# Patient Record
Sex: FEMALE | Race: WHITE | URBAN - METROPOLITAN AREA
[De-identification: names, ages, dates, MRNs, and addresses within clinical notes are randomized per-mention and may not be internally consistent; named-entity substitution may affect disease eponyms.]

---

## 2017-08-29 ENCOUNTER — OP HISTORICAL/CONVERTED ENCOUNTER (OUTPATIENT)
Dept: OTHER | Age: 82
End: 2017-08-29

## 2017-09-01 ENCOUNTER — OP HISTORICAL/CONVERTED ENCOUNTER (OUTPATIENT)
Dept: OTHER | Age: 82
End: 2017-09-01

## 2020-10-21 ENCOUNTER — TELEPHONE (OUTPATIENT)
Dept: ENT CLINIC | Age: 85
End: 2020-10-21

## 2020-12-23 ENCOUNTER — OFFICE VISIT (OUTPATIENT)
Dept: ENT CLINIC | Age: 85
End: 2020-12-23
Payer: MEDICARE

## 2020-12-23 VITALS
TEMPERATURE: 97.5 F | HEIGHT: 64 IN | HEART RATE: 69 BPM | DIASTOLIC BLOOD PRESSURE: 80 MMHG | OXYGEN SATURATION: 96 % | BODY MASS INDEX: 24.41 KG/M2 | RESPIRATION RATE: 18 BRPM | SYSTOLIC BLOOD PRESSURE: 126 MMHG | WEIGHT: 143 LBS

## 2020-12-23 DIAGNOSIS — H61.21 IMPACTED CERUMEN OF RIGHT EAR: ICD-10-CM

## 2020-12-23 DIAGNOSIS — J30.9 ALLERGIC RHINITIS, UNSPECIFIED SEASONALITY, UNSPECIFIED TRIGGER: ICD-10-CM

## 2020-12-23 DIAGNOSIS — H90.3 SENSORINEURAL HEARING LOSS (SNHL) OF BOTH EARS: ICD-10-CM

## 2020-12-23 DIAGNOSIS — H69.83 ETD (EUSTACHIAN TUBE DYSFUNCTION), BILATERAL: ICD-10-CM

## 2020-12-23 PROCEDURE — 99213 OFFICE O/P EST LOW 20 MIN: CPT | Performed by: OTOLARYNGOLOGY

## 2020-12-23 PROCEDURE — 1090F PRES/ABSN URINE INCON ASSESS: CPT | Performed by: OTOLARYNGOLOGY

## 2020-12-23 PROCEDURE — G8510 SCR DEP NEG, NO PLAN REQD: HCPCS | Performed by: OTOLARYNGOLOGY

## 2020-12-23 PROCEDURE — 69210 REMOVE IMPACTED EAR WAX UNI: CPT | Performed by: OTOLARYNGOLOGY

## 2020-12-23 PROCEDURE — G8427 DOCREV CUR MEDS BY ELIG CLIN: HCPCS | Performed by: OTOLARYNGOLOGY

## 2020-12-23 PROCEDURE — G8536 NO DOC ELDER MAL SCRN: HCPCS | Performed by: OTOLARYNGOLOGY

## 2020-12-23 PROCEDURE — 1101F PT FALLS ASSESS-DOCD LE1/YR: CPT | Performed by: OTOLARYNGOLOGY

## 2020-12-23 PROCEDURE — G8420 CALC BMI NORM PARAMETERS: HCPCS | Performed by: OTOLARYNGOLOGY

## 2020-12-23 NOTE — PROGRESS NOTES
Subjective:    Josi Nava   80 y.o.   10/8/1930       Location - ears, nose    Quality - ETD, hearing loss chronic rhinitis    Severity -moderate    Duration -years    Timing -chronic    Context -patient following up today complaining of right ear hearing loss and fullness. History of ETD has required tympanostomy tube in the past.  She has ongoing chronic rhinitis, allergy immunotherapy for many many years in the past she currently does take Flonase but no  oral antihistamine     Modifying Features -none    Associated symptoms/signs -allergic rhinitis      Review of Systems  Review of Systems   Constitutional: Negative for chills and fever. HENT: Positive for congestion and hearing loss. Negative for ear pain, nosebleeds and tinnitus. Eyes: Negative for blurred vision and double vision. Respiratory: Negative for cough, sputum production and shortness of breath. Cardiovascular: Negative for chest pain and palpitations. Gastrointestinal: Negative for heartburn, nausea and vomiting. Musculoskeletal: Positive for joint pain. Negative for neck pain. Skin: Negative. Neurological: Negative for dizziness, speech change, weakness and headaches. Endo/Heme/Allergies: Positive for environmental allergies. Does not bruise/bleed easily. Psychiatric/Behavioral: Negative for memory loss. The patient does not have insomnia. Past Medical History:   Diagnosis Date    Acquired absence of both cervix and uterus 10/15/2012    Atrophy of vulva 4/22/2009    Generalized anxiety disorder     Hx of bone density study 2/24/14    Normal    Hypertension     Postmenopausal atrophic vaginitis 4/22/2009    Symptomatic menopausal or female climacteric states     Unspecified asthma(493.90)     Unspecified hypothyroidism      Prior to Admission medications    Medication Sig Start Date End Date Taking? Authorizing Provider   ASPIRIN PO Take 81 mg by mouth.     Provider, Historical   azelastine (ASTELIN) 137 mcg nasal spray 1 Spray two (2) times a day. Use in each nostril as directed    Provider, Historical   TOLTERODINE TARTRATE (DETROL PO) Take  by mouth. Provider, Historical   FLUTICASONE PROPIONATE (FLOVENT DISKUS IN) Take  by inhalation. Provider, Historical   NITROFURANTOIN MACROCRYSTAL PO Take  by mouth. Provider, Historical   LEVOTHYROXINE SODIUM (SYNTHROID PO) Take  by mouth. Provider, Historical   ALPRAZOLAM (XANAX PO) Take  by mouth. Provider, Historical   budesonide-formoterol (SYMBICORT) 80-4.5 mcg/actuation HFAA inhaler Take 2 Puffs by inhalation two (2) times a day. Provider, Historical   CALCIUM CARBONATE/VITAMIN D3 (CALCIUM WITH VITAMIN D PO) Take  by mouth. Provider, Historical   MULTIVIT &MINERALS/FERROUS FUM (MULTI VITAMIN PO) Take  by mouth. Provider, Historical   PREGABALIN (LYRICA PO) Take  by mouth. Provider, Historical   PSYLLIUM SEED, WITH DEXTROSE, (FIBER PO) Take  by mouth. Provider, Historical            Objective:     Visit Vitals  /80 (BP 1 Location: Left arm, BP Patient Position: Sitting)   Pulse 69   Temp 97.5 °F (36.4 °C)   Resp 18   Ht 5' 4\" (1.626 m)   Wt 143 lb (64.9 kg)   LMP 01/01/1970   SpO2 96%   BMI 24.55 kg/m²        Physical Exam  Vitals signs reviewed. Constitutional:       General: She is awake. She is not in acute distress. Appearance: Normal appearance. She is well-groomed and normal weight. HENT:      Head: Normocephalic and atraumatic. Jaw: There is normal jaw occlusion. No trismus, tenderness or malocclusion. Salivary Glands: Right salivary gland is not diffusely enlarged or tender. Left salivary gland is not diffusely enlarged or tender. Right Ear: Tympanic membrane, ear canal and external ear normal. Decreased hearing noted. There is impacted cerumen. Left Ear: Tympanic membrane, ear canal and external ear normal. Decreased hearing noted. Ears:      Patel exam findings: does not lateralize.      Right Rinne: AC > BC. Left Rinne: AC > BC. Comments:   Procedure - Removal Impacted Cerumen    Indications: cerumen impaction    Ears are examined under microscope/headlight.  right ears are cleaned using otologic curette, suction, and/or alligator forceps. Nose: Rhinorrhea present. No nasal deformity, septal deviation or mucosal edema. Right Nostril: No epistaxis. Left Nostril: No epistaxis. Right Turbinates: Not enlarged, swollen or pale. Left Turbinates: Not enlarged, swollen or pale. Right Sinus: No maxillary sinus tenderness or frontal sinus tenderness. Left Sinus: No maxillary sinus tenderness or frontal sinus tenderness. Mouth/Throat:      Lips: Pink. No lesions. Mouth: Mucous membranes are moist. No oral lesions. Dentition: Normal dentition. No dental caries. Tongue: No lesions. Palate: No mass and lesions. Pharynx: Oropharynx is clear. Uvula midline. No oropharyngeal exudate or posterior oropharyngeal erythema. Tonsils: No tonsillar exudate. 0 on the right. 0 on the left. Eyes:      General: Vision grossly intact. Extraocular Movements: Extraocular movements intact. Right eye: No nystagmus. Left eye: No nystagmus. Conjunctiva/sclera: Conjunctivae normal.      Pupils: Pupils are equal, round, and reactive to light. Neck:      Musculoskeletal: No edema or erythema. Thyroid: No thyroid mass, thyromegaly or thyroid tenderness. Trachea: Trachea and phonation normal. No tracheal tenderness or tracheal deviation. Cardiovascular:      Rate and Rhythm: Normal rate and regular rhythm. Pulmonary:      Effort: Pulmonary effort is normal. No respiratory distress. Breath sounds: No stridor. Musculoskeletal: Normal range of motion. General: No swelling or tenderness. Lymphadenopathy:      Cervical: No cervical adenopathy. Skin:     General: Skin is warm and dry.       Findings: No lesion or rash.   Neurological:      General: No focal deficit present. Mental Status: She is alert and oriented to person, place, and time. Mental status is at baseline. Cranial Nerves: Cranial nerves are intact. Coordination: Romberg sign negative. Gait: Gait is intact. Comments: Negative Hallpike   Psychiatric:         Mood and Affect: Mood normal.         Behavior: Behavior normal. Behavior is cooperative. Assessment/Plan:     Encounter Diagnoses   Name Primary?  Allergic rhinitis, unspecified seasonality, unspecified trigger     Sensorineural hearing loss (SNHL) of both ears     Impacted cerumen of right ear     ETD (Eustachian tube dysfunction), bilateral        Right ear is cleaned, hearing is improved  She will continue on her current regimen with allergy medication  Follow-up 6 months    Orders Placed This Encounter    REMOVE IMPACTED EAR WAX     Follow-up and Dispositions    · Return in about 6 months (around 6/23/2021).

## 2021-06-23 ENCOUNTER — OFFICE VISIT (OUTPATIENT)
Dept: ENT CLINIC | Age: 86
End: 2021-06-23
Payer: MEDICARE

## 2021-06-23 VITALS
HEART RATE: 77 BPM | BODY MASS INDEX: 24.41 KG/M2 | OXYGEN SATURATION: 98 % | WEIGHT: 143 LBS | TEMPERATURE: 97.4 F | RESPIRATION RATE: 18 BRPM | DIASTOLIC BLOOD PRESSURE: 76 MMHG | SYSTOLIC BLOOD PRESSURE: 120 MMHG | HEIGHT: 64 IN

## 2021-06-23 DIAGNOSIS — H69.83 ETD (EUSTACHIAN TUBE DYSFUNCTION), BILATERAL: ICD-10-CM

## 2021-06-23 DIAGNOSIS — H90.3 SENSORINEURAL HEARING LOSS (SNHL) OF BOTH EARS: ICD-10-CM

## 2021-06-23 DIAGNOSIS — J30.9 ALLERGIC RHINITIS, UNSPECIFIED SEASONALITY, UNSPECIFIED TRIGGER: Primary | ICD-10-CM

## 2021-06-23 DIAGNOSIS — H61.21 IMPACTED CERUMEN OF RIGHT EAR: ICD-10-CM

## 2021-06-23 PROCEDURE — G8427 DOCREV CUR MEDS BY ELIG CLIN: HCPCS | Performed by: OTOLARYNGOLOGY

## 2021-06-23 PROCEDURE — 1101F PT FALLS ASSESS-DOCD LE1/YR: CPT | Performed by: OTOLARYNGOLOGY

## 2021-06-23 PROCEDURE — G8420 CALC BMI NORM PARAMETERS: HCPCS | Performed by: OTOLARYNGOLOGY

## 2021-06-23 PROCEDURE — G8536 NO DOC ELDER MAL SCRN: HCPCS | Performed by: OTOLARYNGOLOGY

## 2021-06-23 PROCEDURE — 1090F PRES/ABSN URINE INCON ASSESS: CPT | Performed by: OTOLARYNGOLOGY

## 2021-06-23 PROCEDURE — G8510 SCR DEP NEG, NO PLAN REQD: HCPCS | Performed by: OTOLARYNGOLOGY

## 2021-06-23 PROCEDURE — 69210 REMOVE IMPACTED EAR WAX UNI: CPT | Performed by: OTOLARYNGOLOGY

## 2021-06-23 PROCEDURE — 99213 OFFICE O/P EST LOW 20 MIN: CPT | Performed by: OTOLARYNGOLOGY

## 2021-06-23 RX ORDER — MONTELUKAST SODIUM 10 MG/1
10 TABLET ORAL DAILY
Qty: 30 TABLET | Refills: 3 | Status: SHIPPED | OUTPATIENT
Start: 2021-06-23

## 2021-06-23 NOTE — PROGRESS NOTES
Subjective:    Edgar Solis   80 y.o.   10/8/1930     Follow-up visit    Initial HPI  Location - ears, nose    Quality - ETD, hearing loss chronic rhinitis    Severity -moderate    Duration -years    Timing -chronic    Context -patient following up today complaining of right ear hearing loss and fullness. History of ETD has required tympanostomy tube in the past.  She has ongoing chronic rhinitis, allergy immunotherapy for many many years in the past she currently does take Flonase but no  oral antihistamine     Modifying Features -none    Associated symptoms/signs -allergic rhinitis    Follow-up HPI  6/23/2021 -6-month follow-up patient states she did have 1 more trouble with her allergies this past spring but did not take anything for it. She has no longer taking Singulair. She is on Symbicort. Right ear occasional popping but no fullness nor pressure. Review of Systems  Review of Systems   Constitutional: Negative for chills and fever. HENT: Positive for congestion and hearing loss. Negative for ear pain, nosebleeds and tinnitus. Eyes: Negative for blurred vision and double vision. Respiratory: Positive for cough. Negative for sputum production and shortness of breath. Cardiovascular: Negative for chest pain and palpitations. Gastrointestinal: Negative for heartburn, nausea and vomiting. Musculoskeletal: Positive for joint pain. Negative for neck pain. Skin: Negative. Neurological: Negative for dizziness, speech change, weakness and headaches. Endo/Heme/Allergies: Positive for environmental allergies. Does not bruise/bleed easily. Psychiatric/Behavioral: Negative for memory loss. The patient does not have insomnia.           Past Medical History:   Diagnosis Date    Acquired absence of both cervix and uterus 10/15/2012    Atrophy of vulva 4/22/2009    Generalized anxiety disorder     Hx of bone density study 2/24/14    Normal    Hypertension     Postmenopausal atrophic vaginitis 4/22/2009    Symptomatic menopausal or female climacteric states     Unspecified asthma(493.90)     Unspecified hypothyroidism      Prior to Admission medications    Medication Sig Start Date End Date Taking? Authorizing Provider   montelukast (SINGULAIR) 10 mg tablet Take 1 Tablet by mouth daily. 6/23/21  Yes Octaviano Marie MD   ASPIRIN PO Take 81 mg by mouth. Provider, Historical   azelastine (ASTELIN) 137 mcg nasal spray 1 Spray two (2) times a day. Use in each nostril as directed    Provider, Historical   TOLTERODINE TARTRATE (DETROL PO) Take  by mouth. Provider, Historical   FLUTICASONE PROPIONATE (FLOVENT DISKUS IN) Take  by inhalation. Provider, Historical   NITROFURANTOIN MACROCRYSTAL PO Take  by mouth. Provider, Historical   LEVOTHYROXINE SODIUM (SYNTHROID PO) Take  by mouth. Provider, Historical   ALPRAZOLAM (XANAX PO) Take  by mouth. Provider, Historical   budesonide-formoterol (SYMBICORT) 80-4.5 mcg/actuation HFAA inhaler Take 2 Puffs by inhalation two (2) times a day. Provider, Historical   CALCIUM CARBONATE/VITAMIN D3 (CALCIUM WITH VITAMIN D PO) Take  by mouth. Provider, Historical   MULTIVIT &MINERALS/FERROUS FUM (MULTI VITAMIN PO) Take  by mouth. Provider, Historical   PREGABALIN (LYRICA PO) Take  by mouth. Provider, Historical   PSYLLIUM SEED, WITH DEXTROSE, (FIBER PO) Take  by mouth. Provider, Historical            Objective:     Visit Vitals  /76 (BP 1 Location: Left upper arm, BP Patient Position: Sitting, BP Cuff Size: Adult)   Pulse 77   Temp 97.4 °F (36.3 °C) (Temporal)   Resp 18   Ht 5' 4\" (1.626 m)   Wt 143 lb (64.9 kg)   LMP 01/01/1970   SpO2 98%   BMI 24.55 kg/m²        Physical Exam  Vitals reviewed. Constitutional:       General: She is awake. She is not in acute distress. Appearance: Normal appearance. She is well-groomed and normal weight. HENT:      Head: Normocephalic and atraumatic. Jaw: There is normal jaw occlusion.  No trismus, tenderness or malocclusion. Salivary Glands: Right salivary gland is not diffusely enlarged or tender. Left salivary gland is not diffusely enlarged or tender. Right Ear: Tympanic membrane, ear canal and external ear normal. Decreased hearing noted. There is impacted cerumen. Left Ear: Tympanic membrane, ear canal and external ear normal. Decreased hearing noted. Ears:      Patel exam findings: does not lateralize. Right Rinne: AC > BC. Left Rinne: AC > BC. Nose: Rhinorrhea present. No nasal deformity, septal deviation or mucosal edema. Right Nostril: No epistaxis. Left Nostril: No epistaxis. Right Turbinates: Not enlarged, swollen or pale. Left Turbinates: Not enlarged, swollen or pale. Right Sinus: No maxillary sinus tenderness or frontal sinus tenderness. Left Sinus: No maxillary sinus tenderness or frontal sinus tenderness. Mouth/Throat:      Lips: Pink. No lesions. Mouth: Mucous membranes are moist. No oral lesions. Dentition: Normal dentition. No dental caries. Tongue: No lesions. Palate: No mass and lesions. Pharynx: Oropharynx is clear. Uvula midline. No oropharyngeal exudate or posterior oropharyngeal erythema. Tonsils: No tonsillar exudate. 0 on the right. 0 on the left. Eyes:      General: Vision grossly intact. Extraocular Movements: Extraocular movements intact. Right eye: No nystagmus. Left eye: No nystagmus. Conjunctiva/sclera: Conjunctivae normal.      Pupils: Pupils are equal, round, and reactive to light. Neck:      Thyroid: No thyroid mass, thyromegaly or thyroid tenderness. Trachea: Trachea and phonation normal. No tracheal tenderness or tracheal deviation. Cardiovascular:      Rate and Rhythm: Normal rate and regular rhythm. Pulmonary:      Effort: Pulmonary effort is normal. No respiratory distress. Breath sounds: No stridor.    Musculoskeletal:         General: No swelling or tenderness. Normal range of motion. Cervical back: No edema or erythema. Lymphadenopathy:      Cervical: No cervical adenopathy. Skin:     General: Skin is warm and dry. Findings: No lesion or rash. Neurological:      General: No focal deficit present. Mental Status: She is alert and oriented to person, place, and time. Mental status is at baseline. Cranial Nerves: Cranial nerves are intact. Coordination: Romberg sign negative. Gait: Gait is intact. Psychiatric:         Mood and Affect: Mood normal.         Behavior: Behavior normal. Behavior is cooperative. Procedure - Removal Impacted Cerumen    Indications: cerumen impaction    Ears are examined under microscope/headlight.  right ears are cleaned using otologic curette, suction, and/or alligator forceps. Assessment/Plan:     Encounter Diagnoses   Name Primary?  Allergic rhinitis, unspecified seasonality, unspecified trigger Yes    Sensorineural hearing loss (SNHL) of both ears     Impacted cerumen of right ear     ETD (Eustachian tube dysfunction), bilateral      Allergic rhinitis -recommend she resume montelukast for the time being it may improve her cough is well    ETD is under good control no evidence of fluid nor retraction today. Right ear is cleaned. Orders Placed This Encounter    REMOVE IMPACTED EAR WAX    montelukast (SINGULAIR) 10 mg tablet     Follow-up and Dispositions    · Return in about 6 months (around 12/23/2021).

## 2021-06-23 NOTE — PROGRESS NOTES
Visit Vitals  /76 (BP 1 Location: Left upper arm, BP Patient Position: Sitting, BP Cuff Size: Adult)   Pulse 77   Temp 97.4 °F (36.3 °C) (Temporal)   Resp 18   Ht 5' 4\" (1.626 m)   Wt 143 lb (64.9 kg)   LMP 01/01/1970   SpO2 98%   BMI 24.55 kg/m²     Chief Complaint   Patient presents with    Follow-up     Allergic rhinitis, unspecified seasonality, unspecified trigger

## 2021-10-28 ENCOUNTER — TELEPHONE (OUTPATIENT)
Dept: ENT CLINIC | Age: 86
End: 2021-10-28

## 2021-10-28 NOTE — TELEPHONE ENCOUNTER
LVM for pt to call back and reschedule appt on 12/22, due to Dr. Andra Eng being out of office that week

## 2022-03-18 PROBLEM — H61.21 IMPACTED CERUMEN OF RIGHT EAR: Status: ACTIVE | Noted: 2020-12-23

## 2022-03-19 PROBLEM — H69.93 ETD (EUSTACHIAN TUBE DYSFUNCTION), BILATERAL: Status: ACTIVE | Noted: 2020-12-23

## 2022-03-19 PROBLEM — H69.83 ETD (EUSTACHIAN TUBE DYSFUNCTION), BILATERAL: Status: ACTIVE | Noted: 2020-12-23

## 2022-03-19 PROBLEM — J30.9 ALLERGIC RHINITIS: Status: ACTIVE | Noted: 2020-12-23

## 2022-03-19 PROBLEM — H90.3 SENSORINEURAL HEARING LOSS (SNHL) OF BOTH EARS: Status: ACTIVE | Noted: 2020-12-23

## 2022-11-29 ENCOUNTER — HOSPITAL ENCOUNTER (EMERGENCY)
Age: 87
Discharge: HOME | End: 2022-11-29
Attending: EMERGENCY MEDICINE
Payer: MEDICARE

## 2022-11-29 VITALS
RESPIRATION RATE: 18 BRPM | OXYGEN SATURATION: 98 % | DIASTOLIC BLOOD PRESSURE: 82 MMHG | WEIGHT: 129 LBS | TEMPERATURE: 97.8 F | HEIGHT: 64 IN | BODY MASS INDEX: 22.02 KG/M2 | SYSTOLIC BLOOD PRESSURE: 175 MMHG | HEART RATE: 77 BPM

## 2022-11-29 DIAGNOSIS — S81.811A NONINFECTED SKIN TEAR OF RIGHT LEG, INITIAL ENCOUNTER: Primary | ICD-10-CM

## 2022-11-29 DIAGNOSIS — S80.11XA CONTUSION OF RIGHT LOWER EXTREMITY, INITIAL ENCOUNTER: ICD-10-CM

## 2022-11-29 PROCEDURE — 74011000250 HC RX REV CODE- 250: Performed by: EMERGENCY MEDICINE

## 2022-11-29 PROCEDURE — 99283 EMERGENCY DEPT VISIT LOW MDM: CPT

## 2022-11-29 RX ORDER — BACITRACIN 500 [USP'U]/G
OINTMENT TOPICAL 3 TIMES DAILY
Qty: 30 G | Refills: 0 | Status: SHIPPED | OUTPATIENT
Start: 2022-11-29 | End: 2022-12-09

## 2022-11-29 RX ORDER — BACITRACIN 500 UNIT/G
1 PACKET (EA) TOPICAL
Status: COMPLETED | OUTPATIENT
Start: 2022-11-29 | End: 2022-11-29

## 2022-11-29 RX ADMIN — Medication 1 PACKET: at 14:29

## 2022-11-29 NOTE — ED PROVIDER NOTES
The history is provided by the patient. Laceration   Incident onset: 3 days ago. The laceration is located on the Right leg. The laceration is 2 cm in size. Injury mechanism: scraped her right lateral knee on a table. Foreign body present: no. The pain is mild. The pain has been Constant since onset. Associated symptoms include discoloration (bruising surrounding with some soft tissue swelling). Pertinent negatives include no numbness and no loss of motion. The patient's last tetanus shot was less than 5 years ago. Leg Pain   Pertinent negatives include no numbness.       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    Atrophy of vulva 4/22/2009    Generalized anxiety disorder     Hx of bone density study 2/24/14    Normal    Hypertension     Postmenopausal atrophic vaginitis 4/22/2009    Symptomatic menopausal or female climacteric states     Unspecified asthma(493.90)     Unspecified hypothyroidism        Past Surgical History:   Procedure Laterality Date    HX OTHER SURGICAL      Anterior Posterior Repair    HX PARTIAL THYROIDECTOMY      HX TONSILLECTOMY      HX TOTAL ABDOMINAL HYSTERECTOMY  1970    W/ USO    HX UROLOGICAL      TVT         Family History:   Problem Relation Age of Onset    Cancer Father         Prostate       Social History     Socioeconomic History    Marital status:      Spouse name: Not on file    Number of children: Not on file    Years of education: Not on file    Highest education level: Not on file   Occupational History    Not on file   Tobacco Use    Smoking status: Never    Smokeless tobacco: Never   Substance and Sexual Activity    Alcohol use: Not on file    Drug use: Not on file    Sexual activity: Not Currently   Other Topics Concern    Not on file   Social History Narrative    Not on file     Social Determinants of Health     Financial Resource Strain: Not on file   Food Insecurity: Not on file   Transportation Needs: Not on file   Physical Activity: Not on file   Stress: Not on file   Social Connections: Not on file   Intimate Partner Violence: Not on file   Housing Stability: Not on file         ALLERGIES: Ciprofloxacin; Nut [peanut]; Other plant, animal, environmental; and Sulfa (sulfonamide antibiotics)    Review of Systems   Neurological:  Negative for numbness. All other systems reviewed and are negative. Vitals:    11/29/22 1408   BP: (!) 175/82   Pulse: 77   Resp: 18   Temp: 97.8 °F (36.6 °C)   SpO2: 98%   Weight: 58.5 kg (129 lb)   Height: 5' 4\" (1.626 m)            Physical Exam  Vitals and nursing note reviewed. Constitutional:       General: She is not in acute distress. Appearance: She is well-developed. HENT:      Head: Normocephalic and atraumatic. Eyes:      Conjunctiva/sclera: Conjunctivae normal.   Cardiovascular:      Rate and Rhythm: Normal rate and regular rhythm. Pulmonary:      Effort: Pulmonary effort is normal. No respiratory distress. Abdominal:      General: There is no distension. Musculoskeletal:         General: No deformity. Normal range of motion. Cervical back: Neck supple. Comments: Right lower leg with 2 cm skin tear that is hemostatic in dressing, no erythema, induration or fluctuance. Mild soft tissue swelling and 8 cm of surrounding ecchymosis. Skin:     General: Skin is warm and dry. Neurological:      Mental Status: She is alert. Cranial Nerves: No cranial nerve deficit. Psychiatric:         Behavior: Behavior normal.        MDM       80 y.o. female presents with request for wound care check, dressing changed and bacitracin applied. No signs of infection or complicating features. Provided instructions for supportive care measures. Plan to follow up with PCP as needed and return precautions discussed for worsening or new concerning symptoms.      Procedures

## 2022-11-29 NOTE — ED TRIAGE NOTES
Patient arrives with c/o laceration on posterior right leg that occurred 4 days ago. States hit leg on piece of furniture. Patient was seen at [facility], and had wound cleaned and bandaged. Was told to return after 3 days, but patient states she was not able to be seen due to wait time. Presents with leg bandaged and wrapped. States pain 8/10. Patient ambulatory to triage. Denies fever, N/V, GLF. States did not receive tetanus shot at [facility].    Further reports taking Cephalexin for recent UTI. Has one dose left.

## 2022-12-25 ENCOUNTER — HOSPITAL ENCOUNTER (EMERGENCY)
Age: 87
Discharge: HOME | End: 2022-12-25
Attending: EMERGENCY MEDICINE
Payer: MEDICARE

## 2022-12-25 VITALS
HEART RATE: 87 BPM | SYSTOLIC BLOOD PRESSURE: 179 MMHG | BODY MASS INDEX: 21.17 KG/M2 | RESPIRATION RATE: 19 BRPM | OXYGEN SATURATION: 97 % | WEIGHT: 124 LBS | HEIGHT: 64 IN | TEMPERATURE: 97.5 F | DIASTOLIC BLOOD PRESSURE: 95 MMHG

## 2022-12-25 DIAGNOSIS — I10 ESSENTIAL HYPERTENSION: Primary | ICD-10-CM

## 2022-12-25 PROCEDURE — 99283 EMERGENCY DEPT VISIT LOW MDM: CPT

## 2022-12-25 PROCEDURE — 74011250637 HC RX REV CODE- 250/637: Performed by: EMERGENCY MEDICINE

## 2022-12-25 RX ORDER — METOPROLOL TARTRATE 25 MG/1
25 TABLET, FILM COATED ORAL
Status: COMPLETED | OUTPATIENT
Start: 2022-12-25 | End: 2022-12-25

## 2022-12-25 RX ORDER — METOPROLOL SUCCINATE 25 MG/1
50 TABLET, EXTENDED RELEASE ORAL DAILY
Qty: 60 TABLET | Refills: 0 | Status: SHIPPED | OUTPATIENT
Start: 2022-12-26

## 2022-12-25 RX ORDER — METOPROLOL TARTRATE 5 MG/5ML
5 INJECTION INTRAVENOUS ONCE
Status: DISCONTINUED | OUTPATIENT
Start: 2022-12-25 | End: 2022-12-25

## 2022-12-25 RX ADMIN — METOPROLOL TARTRATE 25 MG: 25 TABLET, FILM COATED ORAL at 20:07

## 2022-12-26 NOTE — ED NOTES
The patient was discharged home by Laura Cerda MD in stable condition. The patient is alert and oriented, in no respiratory distress and discharge vital signs obtained. The patient's diagnosis, condition and treatment were explained. The patient expressed understanding. No prescriptions given. No work/school note given. A discharge plan has been developed. A  was not involved in the process. Aftercare instructions were given. Pt ambulatory out of the ED. Pt discharged from the ED.

## 2022-12-27 NOTE — ED PROVIDER NOTES
The history is provided by the patient. Hypertension   This is a recurrent problem. The current episode started more than 2 days ago. The problem has not changed since onset. Pertinent negatives include no chest pain, no confusion, no blurred vision, no peripheral edema and no shortness of breath. There are no associated agents to hypertension. Risk factors include stress ( of 48 years  recently).       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    Atrophy of vulva 2009    Generalized anxiety disorder     Hx of bone density study 14    Normal    Hypertension     Postmenopausal atrophic vaginitis 2009    Symptomatic menopausal or female climacteric states     Unspecified asthma(493.90)     Unspecified hypothyroidism        Past Surgical History:   Procedure Laterality Date    HX OTHER SURGICAL      Anterior Posterior Repair    HX PARTIAL THYROIDECTOMY      HX TONSILLECTOMY      HX TOTAL ABDOMINAL HYSTERECTOMY      W/ USO    HX UROLOGICAL      TVT         Family History:   Problem Relation Age of Onset    Cancer Father         Prostate       Social History     Socioeconomic History    Marital status:      Spouse name: Not on file    Number of children: Not on file    Years of education: Not on file    Highest education level: Not on file   Occupational History    Not on file   Tobacco Use    Smoking status: Never    Smokeless tobacco: Never   Vaping Use    Vaping Use: Never used   Substance and Sexual Activity    Alcohol use: Not on file    Drug use: Never    Sexual activity: Not Currently   Other Topics Concern    Not on file   Social History Narrative    Not on file     Social Determinants of Health     Financial Resource Strain: Not on file   Food Insecurity: Not on file   Transportation Needs: Not on file   Physical Activity: Not on file   Stress: Not on file   Social Connections: Not on file   Intimate Partner Violence: Not on file   Housing Stability: Not on file         ALLERGIES: Ciprofloxacin; Nut [peanut]; Other plant, animal, environmental; and Sulfa (sulfonamide antibiotics)    Review of Systems   Eyes:  Negative for blurred vision. Respiratory:  Negative for shortness of breath. Cardiovascular:  Negative for chest pain. Psychiatric/Behavioral:  Negative for confusion. All other systems reviewed and are negative. Vitals:    12/25/22 2004 12/25/22 2007 12/25/22 2019 12/25/22 2034   BP: (!) 196/144 (!) 196/90 (!) 218/99 (!) 179/95   Pulse: 85 87     Resp: 19      Temp:       SpO2: 95%  98% 97%   Weight:       Height:                Physical Exam  Vitals and nursing note reviewed. Constitutional:       General: She is not in acute distress. Appearance: She is well-developed. HENT:      Head: Normocephalic and atraumatic. Eyes:      Conjunctiva/sclera: Conjunctivae normal.   Cardiovascular:      Rate and Rhythm: Normal rate and regular rhythm. Heart sounds: Normal heart sounds. Pulmonary:      Effort: Pulmonary effort is normal. No respiratory distress. Breath sounds: Normal breath sounds. Abdominal:      General: There is no distension. Musculoskeletal:         General: No deformity. Normal range of motion. Cervical back: Neck supple. Skin:     General: Skin is warm and dry. Neurological:      Mental Status: She is alert. Cranial Nerves: No cranial nerve deficit. Psychiatric:         Behavior: Behavior normal.        MDM       80 y.o. female presents with asymptomatic hypertension. No signs or symptoms of end-organ damage. Discussed outpatient follow up for medical management and return precautions discussed for new or concerning symptoms.      Procedures

## 2023-05-08 ENCOUNTER — OFFICE VISIT (OUTPATIENT)
Age: 88
End: 2023-05-08
Payer: MEDICARE

## 2023-05-08 VITALS
SYSTOLIC BLOOD PRESSURE: 150 MMHG | DIASTOLIC BLOOD PRESSURE: 82 MMHG | HEART RATE: 86 BPM | RESPIRATION RATE: 16 BRPM | BODY MASS INDEX: 21.34 KG/M2 | HEIGHT: 64 IN | OXYGEN SATURATION: 93 % | WEIGHT: 125 LBS | TEMPERATURE: 97.5 F

## 2023-05-08 DIAGNOSIS — S80.822A BLISTER OF LEFT LOWER EXTREMITY, INITIAL ENCOUNTER: ICD-10-CM

## 2023-05-08 DIAGNOSIS — N39.0 RECURRENT UTI: Primary | ICD-10-CM

## 2023-05-08 PROCEDURE — G8420 CALC BMI NORM PARAMETERS: HCPCS | Performed by: INTERNAL MEDICINE

## 2023-05-08 PROCEDURE — 99203 OFFICE O/P NEW LOW 30 MIN: CPT | Performed by: INTERNAL MEDICINE

## 2023-05-08 PROCEDURE — G8427 DOCREV CUR MEDS BY ELIG CLIN: HCPCS | Performed by: INTERNAL MEDICINE

## 2023-05-08 PROCEDURE — 1123F ACP DISCUSS/DSCN MKR DOCD: CPT | Performed by: INTERNAL MEDICINE

## 2023-05-08 PROCEDURE — 4004F PT TOBACCO SCREEN RCVD TLK: CPT | Performed by: INTERNAL MEDICINE

## 2023-05-08 PROCEDURE — 1090F PRES/ABSN URINE INCON ASSESS: CPT | Performed by: INTERNAL MEDICINE

## 2023-05-08 RX ORDER — NITROFURANTOIN 25; 75 MG/1; MG/1
CAPSULE ORAL
COMMUNITY
Start: 2023-05-06

## 2023-05-08 RX ORDER — METOPROLOL SUCCINATE 25 MG/1
50 TABLET, EXTENDED RELEASE ORAL DAILY
COMMUNITY
Start: 2023-02-14

## 2023-05-08 RX ORDER — ESTRADIOL 0.1 MG/G
CREAM VAGINAL
COMMUNITY
Start: 2023-01-18

## 2023-05-08 RX ORDER — FLUCONAZOLE 150 MG/1
TABLET ORAL
COMMUNITY
Start: 2023-04-14

## 2023-05-08 RX ORDER — PHENAZOPYRIDINE HYDROCHLORIDE 100 MG/1
TABLET, FILM COATED ORAL
COMMUNITY
Start: 2023-04-28

## 2023-05-08 RX ORDER — LISINOPRIL 40 MG/1
40 TABLET ORAL DAILY
COMMUNITY
Start: 2020-09-05

## 2023-05-08 RX ORDER — ATORVASTATIN CALCIUM 20 MG/1
20 TABLET, FILM COATED ORAL
COMMUNITY
Start: 2022-03-21

## 2023-05-08 RX ORDER — LEVOTHYROXINE SODIUM 88 UG/1
88 TABLET ORAL EVERY MORNING
COMMUNITY
Start: 2023-04-25

## 2023-05-08 ASSESSMENT — ENCOUNTER SYMPTOMS: ABDOMINAL PAIN: 0

## 2023-05-08 NOTE — PROGRESS NOTES
Subjective    Stephanie López is a 80 y.o. female     Patient with reportedly long history of recurrent UTIs followed by [facility] with reportedly remote cystoscopy (results unavailable). Normal Renal US in March 2023 but reportedly incomplete bladder emptying. Patient states that she was hospitalized at [facility] recently with \"sepsis\" and required IV Meropenem for Moganella, presumably ESBL . She states that she was there for 8 days. She subsequently developed dysuria again and is now Nitrofurantoin. Results of most recent urine culture are not available to me. She states that he dysuria is improved, but she was also given a bladder anesthetic. Denies any flank pain. Patient also mentioned blister on her left shin after blunt trauma from a television. Review of Systems   Constitutional:  Negative for chills and fever. Gastrointestinal:  Negative for abdominal pain. Genitourinary:  Positive for dysuria. Negative for frequency and hematuria. Skin:         Blister left leg   Allergic/Immunologic: Negative for immunocompromised state. Neurological: Negative. Hematological: Negative. Psychiatric/Behavioral: Negative.          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    Atrophy of vulva 4/22/2009    Generalized anxiety disorder     Hx of bone density study 2/24/14    Normal    Hypertension     Postmenopausal atrophic vaginitis 4/22/2009    Symptomatic menopausal or female climacteric states     Unspecified asthma(493.90)     Unspecified hypothyroidism         Past Surgical History:   Procedure Laterality Date    HYSTERECTOMY, TOTAL ABDOMINAL (CERVIX REMOVED)  1970    W/ USO    OTHER SURGICAL HISTORY      Anterior Posterior Repair    THYROIDECTOMY, PARTIAL      TONSILLECTOMY      UROLOGICAL SURGERY      TVT        Vitals:    05/08/23 1153   BP: (!) 150/82   Pulse: 86   Resp: 16   Temp: 97.5 °F (36.4 °C)   SpO2: 93%   Weight: 125 lb

## 2023-05-09 LAB
APPEARANCE UR: CLEAR
BILIRUB UR QL STRIP: NEGATIVE
COLOR UR: YELLOW
GLUCOSE UR QL STRIP: NEGATIVE
HGB UR QL STRIP: NEGATIVE
KETONES UR QL STRIP: NEGATIVE
LEUKOCYTE ESTERASE UR QL STRIP: ABNORMAL
NITRITE UR QL STRIP: POSITIVE
PH UR STRIP: 6 [PH] (ref 5–7.5)
PROT UR QL STRIP: NEGATIVE
SP GR UR STRIP: 1.01 (ref 1–1.03)
UROBILINOGEN UR STRIP-MCNC: 1 MG/DL (ref 0.2–1)

## 2023-05-10 LAB — BACTERIA UR CULT: NO GROWTH

## 2023-05-12 ENCOUNTER — TELEPHONE (OUTPATIENT)
Age: 88
End: 2023-05-12

## 2023-05-12 DIAGNOSIS — B37.31 VAGINAL YEAST INFECTION: Primary | ICD-10-CM

## 2023-05-12 LAB
BACTERIA SPEC AEROBE CULT: NORMAL
BACTERIA SPEC ANAEROBE CULT: NORMAL

## 2023-05-12 RX ORDER — FLUCONAZOLE 100 MG/1
100 TABLET ORAL DAILY
Qty: 7 TABLET | Refills: 0 | Status: SHIPPED | OUTPATIENT
Start: 2023-05-12 | End: 2023-05-19

## 2023-05-12 NOTE — TELEPHONE ENCOUNTER
Pt called in wanting to know her urinalysis results and stated that she is out of antibiotics.  Pt can be reached at

## 2023-05-12 NOTE — TELEPHONE ENCOUNTER
Urinalysis showing positive nitrite but urine culture was no growth. Informed patient. She is reporting vaginal burning and itching, suggestive of yeast infection. Will send Rx for Fluconazole to her Pharmacy. Wound culture of left leg is still pending.

## 2023-05-17 RX ORDER — AZELASTINE 1 MG/ML
1 SPRAY, METERED NASAL 2 TIMES DAILY
COMMUNITY

## 2023-05-17 RX ORDER — METOPROLOL SUCCINATE 25 MG/1
50 TABLET, EXTENDED RELEASE ORAL DAILY
COMMUNITY
Start: 2022-12-26

## 2023-05-17 RX ORDER — MONTELUKAST SODIUM 10 MG/1
10 TABLET ORAL DAILY
COMMUNITY
Start: 2021-06-23

## 2023-05-17 RX ORDER — BUDESONIDE AND FORMOTEROL FUMARATE DIHYDRATE 80; 4.5 UG/1; UG/1
2 AEROSOL RESPIRATORY (INHALATION) 2 TIMES DAILY
COMMUNITY

## 2023-06-06 ENCOUNTER — TELEPHONE (OUTPATIENT)
Age: 88
End: 2023-06-06

## 2023-06-06 NOTE — TELEPHONE ENCOUNTER
Pt called in wanting to know her results of her culture from her leg. I pulled result from [facility] and scanned into  for you to result. Pt stated that she feels like her leg is not getting any better which is why she is concerned about culture results.  She can reached at

## 2023-06-08 ENCOUNTER — TELEPHONE (OUTPATIENT)
Age: 88
End: 2023-06-08

## 2023-06-08 NOTE — TELEPHONE ENCOUNTER
Pt called in and would like to know the results of the culture I scanned results into  she is concerned because she doesn't feel like the wound is healing and wants to know is she should go see a dermatologist for the spot.  Pt call pt back at

## 2023-06-09 NOTE — TELEPHONE ENCOUNTER
Patient with minor wound on leg. Culture showed only mixed skin brandon and clinically insignificant. Spoke with patient earlier and conveyed these results. Encourage to follow-up in [address] [place] [address] if there is ongoing concern.

## 2023-06-09 NOTE — RESULT ENCOUNTER NOTE
Patient with minor wound on leg. Culture showed only mixed skin brandon and clinically insignificant. Spoke with patient earlier and conveyed these results. Encourage to follow-up in [address] if there is ongoing concern.

## 2023-07-19 ENCOUNTER — OFFICE VISIT (OUTPATIENT)
Age: 88
End: 2023-07-19
Payer: MEDICARE

## 2023-07-19 VITALS
HEIGHT: 64 IN | SYSTOLIC BLOOD PRESSURE: 147 MMHG | BODY MASS INDEX: 21.34 KG/M2 | HEART RATE: 82 BPM | WEIGHT: 125 LBS | OXYGEN SATURATION: 100 % | TEMPERATURE: 97.8 F | DIASTOLIC BLOOD PRESSURE: 84 MMHG

## 2023-07-19 DIAGNOSIS — Z91.89 AT RISK OF UTI: Primary | ICD-10-CM

## 2023-07-19 DIAGNOSIS — R82.81 PYURIA: ICD-10-CM

## 2023-07-19 DIAGNOSIS — N76.1 CHRONIC VAGINITIS: ICD-10-CM

## 2023-07-19 DIAGNOSIS — K59.00 CONSTIPATION, UNSPECIFIED CONSTIPATION TYPE: ICD-10-CM

## 2023-07-19 LAB
BILIRUBIN, URINE, POC: NEGATIVE
BLOOD URINE, POC: NORMAL
GLUCOSE URINE, POC: NEGATIVE
KETONES, URINE, POC: NEGATIVE
LEUKOCYTE ESTERASE, URINE, POC: NORMAL
NITRITE, URINE, POC: POSITIVE
PH, URINE, POC: 7 (ref 4.6–8)
PROTEIN,URINE, POC: NEGATIVE
SPECIFIC GRAVITY, URINE, POC: 1.01 (ref 1–1.03)
URINALYSIS CLARITY, POC: CLEAR
URINALYSIS COLOR, POC: YELLOW
UROBILINOGEN, POC: NORMAL

## 2023-07-19 PROCEDURE — 1090F PRES/ABSN URINE INCON ASSESS: CPT | Performed by: UROLOGY

## 2023-07-19 PROCEDURE — 1123F ACP DISCUSS/DSCN MKR DOCD: CPT | Performed by: UROLOGY

## 2023-07-19 PROCEDURE — 1036F TOBACCO NON-USER: CPT | Performed by: UROLOGY

## 2023-07-19 PROCEDURE — 99204 OFFICE O/P NEW MOD 45 MIN: CPT | Performed by: UROLOGY

## 2023-07-19 PROCEDURE — G8420 CALC BMI NORM PARAMETERS: HCPCS | Performed by: UROLOGY

## 2023-07-19 PROCEDURE — 81003 URINALYSIS AUTO W/O SCOPE: CPT | Performed by: UROLOGY

## 2023-07-19 PROCEDURE — G8427 DOCREV CUR MEDS BY ELIG CLIN: HCPCS | Performed by: UROLOGY

## 2023-07-19 RX ORDER — PSYLLIUM SEED
1 PACKET (EA) ORAL DAILY
Qty: 1 EACH | Refills: 5 | Status: SHIPPED | OUTPATIENT
Start: 2023-07-19

## 2023-07-19 ASSESSMENT — ENCOUNTER SYMPTOMS: SHORTNESS OF BREATH: 1

## 2023-07-19 NOTE — ASSESSMENT & PLAN NOTE
UA today. We discussed evaluation with cystoscopy/ CMG. She had prior vaginal mesh procedures. We should evaluate.

## 2023-07-20 LAB
APPEARANCE UR: ABNORMAL
BACTERIA #/AREA URNS HPF: ABNORMAL /[HPF]
BILIRUB UR QL STRIP: NEGATIVE
CASTS URNS MICRO: ABNORMAL
CASTS URNS QL MICRO: PRESENT /LPF
COLOR UR: YELLOW
EPI CELLS #/AREA URNS HPF: ABNORMAL /HPF (ref 0–10)
GLUCOSE UR QL STRIP: NEGATIVE
HGB UR QL STRIP: ABNORMAL
KETONES UR QL STRIP: NEGATIVE
LEUKOCYTE ESTERASE UR QL STRIP: ABNORMAL
MICRO URNS: ABNORMAL
NITRITE UR QL STRIP: POSITIVE
PH UR STRIP: 7 [PH] (ref 5–7.5)
PROT UR QL STRIP: NEGATIVE
RBC #/AREA URNS HPF: ABNORMAL /HPF (ref 0–2)
SP GR UR STRIP: 1.01 (ref 1–1.03)
UROBILINOGEN UR STRIP-MCNC: 0.2 MG/DL (ref 0.2–1)
WBC #/AREA URNS HPF: >30 /HPF (ref 0–5)

## 2023-07-23 LAB — BACTERIA UR CULT: NORMAL

## 2023-07-24 LAB — BACTERIA UR CULT: ABNORMAL

## 2023-08-29 ENCOUNTER — OFFICE VISIT (OUTPATIENT)
Age: 88
End: 2023-08-29
Payer: MEDICARE

## 2023-08-29 VITALS
BODY MASS INDEX: 21.34 KG/M2 | RESPIRATION RATE: 17 BRPM | WEIGHT: 125 LBS | DIASTOLIC BLOOD PRESSURE: 80 MMHG | OXYGEN SATURATION: 94 % | HEART RATE: 72 BPM | SYSTOLIC BLOOD PRESSURE: 134 MMHG | HEIGHT: 64 IN

## 2023-08-29 DIAGNOSIS — H69.83 ETD (EUSTACHIAN TUBE DYSFUNCTION), BILATERAL: ICD-10-CM

## 2023-08-29 DIAGNOSIS — H61.21 IMPACTED CERUMEN OF RIGHT EAR: ICD-10-CM

## 2023-08-29 DIAGNOSIS — H90.3 SENSORINEURAL HEARING LOSS (SNHL) OF BOTH EARS: Primary | ICD-10-CM

## 2023-08-29 PROCEDURE — 69210 REMOVE IMPACTED EAR WAX UNI: CPT | Performed by: OTOLARYNGOLOGY

## 2023-08-29 PROCEDURE — 1036F TOBACCO NON-USER: CPT | Performed by: OTOLARYNGOLOGY

## 2023-08-29 PROCEDURE — 1123F ACP DISCUSS/DSCN MKR DOCD: CPT | Performed by: OTOLARYNGOLOGY

## 2023-08-29 PROCEDURE — 1090F PRES/ABSN URINE INCON ASSESS: CPT | Performed by: OTOLARYNGOLOGY

## 2023-08-29 PROCEDURE — G8427 DOCREV CUR MEDS BY ELIG CLIN: HCPCS | Performed by: OTOLARYNGOLOGY

## 2023-08-29 PROCEDURE — 99214 OFFICE O/P EST MOD 30 MIN: CPT | Performed by: OTOLARYNGOLOGY

## 2023-08-29 PROCEDURE — G8420 CALC BMI NORM PARAMETERS: HCPCS | Performed by: OTOLARYNGOLOGY

## 2023-08-29 RX ORDER — FEXOFENADINE HCL 180 MG/1
180 TABLET ORAL DAILY
Qty: 30 TABLET | Refills: 1 | Status: SHIPPED | OUTPATIENT
Start: 2023-08-29 | End: 2023-09-28

## 2023-08-29 ASSESSMENT — ENCOUNTER SYMPTOMS
COUGH: 0
SHORTNESS OF BREATH: 0
BACK PAIN: 0
SINUS PAIN: 0
SORE THROAT: 0
PHOTOPHOBIA: 0
STRIDOR: 0
EYE ITCHING: 0
EYE DISCHARGE: 0
APNEA: 0
WHEEZING: 0
CHOKING: 0
NAUSEA: 0
TROUBLE SWALLOWING: 0
VOMITING: 0
SINUS PRESSURE: 0
ABDOMINAL PAIN: 0
VOICE CHANGE: 0

## 2023-08-29 NOTE — PROGRESS NOTES
Subjective:    Isabel Wolf   80 y.o.   10/8/1930     Followup Visit    History of Present Illness:  June 2021  Location - ears, nose     Quality - ETD, hearing loss chronic rhinitis     Severity -moderate     Duration -years     Timing -chronic     Context -patient following up today complaining of right ear hearing loss and fullness. History of ETD has required tympanostomy tube in the past.  She has ongoing chronic rhinitis, allergy immunotherapy for many many years in the past she currently does take Flonase but no  oral antihistamine      Modifying Features -none     Associated symptoms/signs -allergic rhinitis     Follow-up HPI  6/23/2021 -6-month follow-up patient states she did have 1 more trouble with her allergies this past spring but did not take anything for it. She has no longer taking Singulair. She is on Symbicort. Right ear occasional popping but no fullness nor pressure. 8/29/2023  2-year follow-up - she wants ears checked and also has been having some nasal congestion and clear drainage. She has been using flonase. She also remains on an inhaler. Review of Systems  Review of Systems   Constitutional:  Negative for appetite change, chills, fatigue and fever. HENT:  Positive for hearing loss. Negative for congestion, ear discharge, ear pain, nosebleeds, postnasal drip, sinus pressure, sinus pain, sneezing, sore throat, tinnitus, trouble swallowing and voice change. Eyes:  Negative for photophobia, discharge, itching and visual disturbance. Respiratory:  Negative for apnea, cough, choking, shortness of breath, wheezing and stridor. Cardiovascular:  Negative for chest pain and palpitations. Gastrointestinal:  Negative for abdominal pain, nausea and vomiting. Endocrine: Negative for cold intolerance and heat intolerance. Genitourinary:  Negative for difficulty urinating and dysuria.    Musculoskeletal:  Negative for arthralgias, back pain, gait problem, myalgias, neck pain and

## 2023-08-30 ENCOUNTER — TELEPHONE (OUTPATIENT)
Age: 88
End: 2023-08-30

## 2023-09-12 NOTE — TELEPHONE ENCOUNTER
Patient called unsure if she had transportation. I've already spoke to her & confirmed she will be here.

## 2023-09-14 PROBLEM — R32 URINARY INCONTINENCE: Status: ACTIVE | Noted: 2023-09-14

## 2023-09-14 PROBLEM — Z87.448 HISTORY OF BLADDER SUSPENSION PROCEDURE: Status: ACTIVE | Noted: 2023-09-14

## 2023-09-14 PROBLEM — R35.1 NOCTURIA: Status: ACTIVE | Noted: 2023-09-14

## 2023-09-14 PROBLEM — Z98.890 HISTORY OF BLADDER SUSPENSION PROCEDURE: Status: ACTIVE | Noted: 2023-09-14

## 2023-09-18 ENCOUNTER — TELEPHONE (OUTPATIENT)
Age: 88
End: 2023-09-18

## 2023-09-18 NOTE — TELEPHONE ENCOUNTER
Request for lab works for PT due to healing wounds on Pt legs L[facility] [place] [phone] [facility]#[phone])

## 2023-09-22 ENCOUNTER — TELEPHONE (OUTPATIENT)
Age: 88
End: 2023-09-22

## 2023-09-26 ENCOUNTER — TELEPHONE (OUTPATIENT)
Age: 88
End: 2023-09-26

## 2023-09-26 ENCOUNTER — PROCEDURE VISIT (OUTPATIENT)
Age: 88
End: 2023-09-26
Payer: MEDICARE

## 2023-09-26 VITALS — BODY MASS INDEX: 21.85 KG/M2 | HEIGHT: 64 IN | WEIGHT: 128 LBS

## 2023-09-26 DIAGNOSIS — R30.0 DYSURIA: ICD-10-CM

## 2023-09-26 DIAGNOSIS — R33.9 INCOMPLETE EMPTYING OF BLADDER: ICD-10-CM

## 2023-09-26 DIAGNOSIS — Z91.89 AT RISK OF UTI: Primary | ICD-10-CM

## 2023-09-26 DIAGNOSIS — R35.1 NOCTURIA: ICD-10-CM

## 2023-09-26 DIAGNOSIS — K59.00 CONSTIPATION, UNSPECIFIED CONSTIPATION TYPE: ICD-10-CM

## 2023-09-26 DIAGNOSIS — Z87.448 HISTORY OF BLADDER SUSPENSION PROCEDURE: ICD-10-CM

## 2023-09-26 DIAGNOSIS — Z98.890 HISTORY OF BLADDER SUSPENSION PROCEDURE: ICD-10-CM

## 2023-09-26 DIAGNOSIS — R32 URINARY INCONTINENCE, UNSPECIFIED TYPE: ICD-10-CM

## 2023-09-26 PROBLEM — K59.09 OTHER CONSTIPATION: Status: ACTIVE | Noted: 2023-07-19

## 2023-09-26 LAB
BILIRUBIN, URINE, POC: NEGATIVE
BILIRUBIN, URINE, POC: NEGATIVE
BLOOD URINE, POC: ABNORMAL
BLOOD URINE, POC: ABNORMAL
GLUCOSE URINE, POC: NEGATIVE
GLUCOSE URINE, POC: NEGATIVE
KETONES, URINE, POC: NEGATIVE
KETONES, URINE, POC: NEGATIVE
LEUKOCYTE ESTERASE, URINE, POC: ABNORMAL
LEUKOCYTE ESTERASE, URINE, POC: ABNORMAL
NITRITE, URINE, POC: NEGATIVE
NITRITE, URINE, POC: NEGATIVE
PH, URINE, POC: 7 (ref 4.6–8)
PH, URINE, POC: 7 (ref 4.6–8)
PROTEIN,URINE, POC: NEGATIVE
PROTEIN,URINE, POC: NEGATIVE
SPECIFIC GRAVITY, URINE, POC: 1.01 (ref 1–1.03)
SPECIFIC GRAVITY, URINE, POC: 1.01 (ref 1–1.03)
URINALYSIS CLARITY, POC: ABNORMAL
URINALYSIS CLARITY, POC: CLEAR
URINALYSIS COLOR, POC: YELLOW
URINALYSIS COLOR, POC: YELLOW
UROBILINOGEN, POC: ABNORMAL
UROBILINOGEN, POC: ABNORMAL

## 2023-09-26 PROCEDURE — 51725 SIMPLE CYSTOMETROGRAM: CPT | Performed by: UROLOGY

## 2023-09-26 PROCEDURE — 1123F ACP DISCUSS/DSCN MKR DOCD: CPT | Performed by: UROLOGY

## 2023-09-26 PROCEDURE — 1036F TOBACCO NON-USER: CPT | Performed by: UROLOGY

## 2023-09-26 PROCEDURE — 81003 URINALYSIS AUTO W/O SCOPE: CPT | Performed by: UROLOGY

## 2023-09-26 PROCEDURE — 51798 US URINE CAPACITY MEASURE: CPT | Performed by: UROLOGY

## 2023-09-26 PROCEDURE — 0509F URINE INCON PLAN DOCD: CPT | Performed by: UROLOGY

## 2023-09-26 PROCEDURE — G8420 CALC BMI NORM PARAMETERS: HCPCS | Performed by: UROLOGY

## 2023-09-26 PROCEDURE — 1090F PRES/ABSN URINE INCON ASSESS: CPT | Performed by: UROLOGY

## 2023-09-26 PROCEDURE — G8427 DOCREV CUR MEDS BY ELIG CLIN: HCPCS | Performed by: UROLOGY

## 2023-09-26 PROCEDURE — 99214 OFFICE O/P EST MOD 30 MIN: CPT | Performed by: UROLOGY

## 2023-09-26 PROCEDURE — 52000 CYSTOURETHROSCOPY: CPT | Performed by: UROLOGY

## 2023-09-26 RX ORDER — BETHANECHOL CHLORIDE 10 MG/1
10 TABLET ORAL 4 TIMES DAILY
Qty: 90 TABLET | Refills: 3 | Status: SHIPPED | OUTPATIENT
Start: 2023-09-26 | End: 2023-09-27

## 2023-09-26 NOTE — PROGRESS NOTES
HISTORY OF PRESENT ILLNESS  David Franco is a 80 y.o. female. has a past medical history of Acquired absence of both cervix and uterus, Atrophy of vulva, Generalized anxiety disorder, Hx of bone density study, Hypertension, Postmenopausal atrophic vaginitis, Symptomatic menopausal or female climacteric states, Unspecified asthma(493.90), and Unspecified hypothyroidism. has a past surgical history that includes Thyroidectomy, partial; Hysterectomy, total abdominal (1970); Tonsillectomy; Urological Surgery; and other surgical history. Chief Complaint   Patient presents with    Cystoscopy    Other     pyuria     She has some dysuria starting today. Otherwise her urine pattern is unchanged. She leaks with an urge to void. She denies JOSE. 1. At risk of UTI  Overview:  HX of recurrent UTI. Many drug allergies or intolerances. Assessment & Plan:   Incomplete emptying may be affecting her  Orders:  -     AMB POC URINALYSIS DIP STICK AUTO W/O MICRO  -     Culture, Urine  -     Culture, Fungus  2. Dysuria  -     Culture, Fungus  3. Urinary incontinence, unspecified type  Assessment & Plan:  Urge related but probably more overflow. I will start her on bethanechol. Orders:  -     AMB POC URINALYSIS DIP STICK AUTO W/O MICRO  -     AMB POC PVR, JULES,POST-VOID RES,US,NON-IMAGING  -     AMB POC UROFLOWMETRY  -     AMB POC URINALYSIS DIP STICK AUTO W/O MICRO  -     Culture, Urine  4. Incomplete emptying of bladder  Assessment & Plan:  She was unable to void with 468cc in. Straight catheterization performed. I advised timed voiding every 2 hrs. I will start her on bethanechol 10 qid. We can adjust dosing as needed. PVR at follow up. 5. Nocturia  Overview:  4-5 times/night. 6. Constipation, unspecified constipation type  Overview:  Has been advised on fiber supplements. Assessment & Plan:   She thinks she feels less urge for BMs. Bethanechol for urine symptoms may affect this as well.    7. History of bladder

## 2023-09-26 NOTE — ASSESSMENT & PLAN NOTE
She was unable to void with 468cc in. Straight catheterization performed. I advised timed voiding every 2 hrs. I will start her on bethanechol 10 qid. We can adjust dosing as needed. PVR at follow up.

## 2023-09-26 NOTE — PROGRESS NOTES
Cystoscopy - Female    Findings:  Initial residual: large, 250cc. Some debris  Anterior urethra: normal mucosa  Bladder neck: Normal appearing  Bladder mucosa: Intact, no bas fond deformity, did not descend with strain  Trabeculation: none  Diverticula: none  Ureteral orifices: normal, efflux clear urine    CMG    Initial urge at (cc): 250  Moderate urge at (cc): 400  Findings: No uninhibited contractions noted. No urge related incontinence noted    Unable to void. Residual Volume:468ml  Straight catheterization performed    Impression: Incomplete emptying.

## 2023-09-27 ENCOUNTER — TELEPHONE (OUTPATIENT)
Age: 88
End: 2023-09-27

## 2023-09-27 RX ORDER — BETHANECHOL CHLORIDE 10 MG/1
10 TABLET ORAL 4 TIMES DAILY
Qty: 120 TABLET | Refills: 3 | Status: SHIPPED | OUTPATIENT
Start: 2023-09-27 | End: 2025-01-19

## 2023-09-27 NOTE — TELEPHONE ENCOUNTER
Patient left message wanting to talk to someone about medications Dr Lalo Hernandez prescribed yesterday.  Can you call patient

## 2023-09-28 NOTE — TELEPHONE ENCOUNTER
Patient requesting duplicate medications be removed from her chart. Questions about an additional medication to help with bowel issues and urgency. I have explained that bethanechol may help with constipation as well due to the cholinergic effects. She does not need an additional medication. She expressed understanding and we reviewed the med list again.

## 2023-10-01 LAB — BACTERIA UR CULT: ABNORMAL

## 2023-10-02 RX ORDER — NITROFURANTOIN 25; 75 MG/1; MG/1
100 CAPSULE ORAL 2 TIMES DAILY
Qty: 6 CAPSULE | Refills: 0 | Status: SHIPPED | OUTPATIENT
Start: 2023-10-02 | End: 2023-10-03

## 2023-10-03 ENCOUNTER — HOSPITAL ENCOUNTER (EMERGENCY)
Facility: HOSPITAL | Age: 88
Discharge: HOME | End: 2023-10-03
Attending: EMERGENCY MEDICINE
Payer: MEDICARE

## 2023-10-03 ENCOUNTER — APPOINTMENT (OUTPATIENT)
Facility: HOSPITAL | Age: 88
End: 2023-10-03
Payer: MEDICARE

## 2023-10-03 VITALS
WEIGHT: 135 LBS | TEMPERATURE: 97.5 F | SYSTOLIC BLOOD PRESSURE: 133 MMHG | HEIGHT: 64 IN | HEART RATE: 71 BPM | OXYGEN SATURATION: 97 % | DIASTOLIC BLOOD PRESSURE: 85 MMHG | BODY MASS INDEX: 23.05 KG/M2 | RESPIRATION RATE: 19 BRPM

## 2023-10-03 DIAGNOSIS — T50.905A MEDICATION SIDE EFFECT, INITIAL ENCOUNTER: Primary | ICD-10-CM

## 2023-10-03 DIAGNOSIS — I16.0 HYPERTENSIVE URGENCY: ICD-10-CM

## 2023-10-03 DIAGNOSIS — R51.9 ACUTE NONINTRACTABLE HEADACHE, UNSPECIFIED HEADACHE TYPE: ICD-10-CM

## 2023-10-03 LAB
ALBUMIN SERPL-MCNC: 4.3 G/DL (ref 3.5–5.2)
ALBUMIN/GLOB SERPL: 1.7 (ref 1.1–2.2)
ALP SERPL-CCNC: 79 U/L (ref 35–104)
ALT SERPL-CCNC: 43 U/L (ref 10–35)
ANION GAP SERPL CALC-SCNC: 11 MMOL/L (ref 5–15)
AST SERPL-CCNC: 56 U/L (ref 10–35)
BASOPHILS # BLD: 0 K/UL (ref 0–1)
BASOPHILS NFR BLD: 0 % (ref 0–1)
BILIRUB SERPL-MCNC: 0.5 MG/DL (ref 0.2–1)
BUN SERPL-MCNC: 23 MG/DL (ref 8–23)
BUN/CREAT SERPL: 43 (ref 12–20)
CALCIUM SERPL-MCNC: 9.3 MG/DL (ref 8.2–9.6)
CHLORIDE SERPL-SCNC: 94 MMOL/L (ref 98–107)
CO2 SERPL-SCNC: 24 MMOL/L (ref 22–29)
COMMENT:: NORMAL
CREAT SERPL-MCNC: 0.53 MG/DL (ref 0.5–0.9)
DIFFERENTIAL METHOD BLD: ABNORMAL
EOSINOPHIL # BLD: 0 K/UL (ref 0–0.4)
EOSINOPHIL NFR BLD: 0 %
ERYTHROCYTE [DISTWIDTH] IN BLOOD BY AUTOMATED COUNT: 13.2 % (ref 11.5–14.5)
FUNGUS SPEC CULT: NORMAL
GLOBULIN SER CALC-MCNC: 2.5 G/DL (ref 2–4)
GLUCOSE SERPL-MCNC: 124 MG/DL (ref 65–100)
HCT VFR BLD AUTO: 37.2 % (ref 35–47)
HGB BLD-MCNC: 12.8 G/DL (ref 11.5–16)
IMM GRANULOCYTES # BLD AUTO: 0 K/UL (ref 0–0.04)
IMM GRANULOCYTES NFR BLD AUTO: 1 % (ref 0–0.5)
LYMPHOCYTES # BLD: 1.2 K/UL (ref 0.8–3.5)
LYMPHOCYTES NFR BLD: 14 % (ref 12–49)
MCH RBC QN AUTO: 31.4 PG (ref 26–34)
MCHC RBC AUTO-ENTMCNC: 34.4 G/DL (ref 30–36.5)
MCV RBC AUTO: 91.2 FL (ref 80–99)
MONOCYTES # BLD: 0.6 K/UL (ref 0–1)
MONOCYTES NFR BLD: 7 % (ref 5–13)
NEUTS SEG # BLD: 6.7 K/UL (ref 1.8–8)
NEUTS SEG NFR BLD: 78 % (ref 32–75)
NRBC # BLD: 0 K/UL (ref 0–0.01)
NRBC BLD-RTO: 0 PER 100 WBC
PLATELET # BLD AUTO: 259 K/UL (ref 150–400)
PMV BLD AUTO: 9.6 FL (ref 8.9–12.9)
POTASSIUM SERPL-SCNC: 4.4 MMOL/L (ref 3.5–5.1)
PROT SERPL-MCNC: 6.8 G/DL (ref 6.4–8.3)
RBC # BLD AUTO: 4.08 M/UL (ref 3.8–5.2)
SODIUM SERPL-SCNC: 129 MMOL/L (ref 136–145)
SPECIMEN HOLD: NORMAL
TROPONIN I BLD-MCNC: <0.04 NG/ML (ref 0–0.08)
WBC # BLD AUTO: 8.5 K/UL (ref 3.6–11)

## 2023-10-03 PROCEDURE — 70450 CT HEAD/BRAIN W/O DYE: CPT

## 2023-10-03 PROCEDURE — 99284 EMERGENCY DEPT VISIT MOD MDM: CPT

## 2023-10-03 PROCEDURE — 93005 ELECTROCARDIOGRAM TRACING: CPT | Performed by: EMERGENCY MEDICINE

## 2023-10-03 PROCEDURE — 85025 COMPLETE CBC W/AUTO DIFF WBC: CPT

## 2023-10-03 PROCEDURE — 84484 ASSAY OF TROPONIN QUANT: CPT

## 2023-10-03 PROCEDURE — 6370000000 HC RX 637 (ALT 250 FOR IP): Performed by: EMERGENCY MEDICINE

## 2023-10-03 PROCEDURE — 80053 COMPREHEN METABOLIC PANEL: CPT

## 2023-10-03 PROCEDURE — 36415 COLL VENOUS BLD VENIPUNCTURE: CPT

## 2023-10-03 RX ORDER — ALPRAZOLAM 0.5 MG/1
0.5 TABLET ORAL
Status: COMPLETED | OUTPATIENT
Start: 2023-10-03 | End: 2023-10-03

## 2023-10-03 RX ORDER — PREDNISONE 5 MG/1
5 TABLET ORAL DAILY
COMMUNITY

## 2023-10-03 RX ORDER — METOPROLOL TARTRATE 50 MG/1
50 TABLET, FILM COATED ORAL
Status: COMPLETED | OUTPATIENT
Start: 2023-10-03 | End: 2023-10-03

## 2023-10-03 RX ORDER — ACETAMINOPHEN 325 MG/1
650 TABLET ORAL
Status: COMPLETED | OUTPATIENT
Start: 2023-10-03 | End: 2023-10-03

## 2023-10-03 RX ADMIN — METOPROLOL TARTRATE 50 MG: 50 TABLET ORAL at 21:02

## 2023-10-03 RX ADMIN — ACETAMINOPHEN 650 MG: 325 TABLET ORAL at 21:02

## 2023-10-03 RX ADMIN — ALPRAZOLAM 0.5 MG: 0.5 TABLET ORAL at 21:02

## 2023-10-03 ASSESSMENT — PAIN - FUNCTIONAL ASSESSMENT: PAIN_FUNCTIONAL_ASSESSMENT: 0-10

## 2023-10-03 ASSESSMENT — LIFESTYLE VARIABLES
HOW MANY STANDARD DRINKS CONTAINING ALCOHOL DO YOU HAVE ON A TYPICAL DAY: PATIENT DOES NOT DRINK
HOW OFTEN DO YOU HAVE A DRINK CONTAINING ALCOHOL: NEVER

## 2023-10-03 ASSESSMENT — PAIN SCALES - GENERAL
PAINLEVEL_OUTOF10: 3
PAINLEVEL_OUTOF10: 4

## 2023-10-03 ASSESSMENT — PAIN DESCRIPTION - LOCATION
LOCATION: HEAD
LOCATION: HEAD

## 2023-10-03 ASSESSMENT — PAIN DESCRIPTION - PAIN TYPE: TYPE: ACUTE PAIN

## 2023-10-04 NOTE — ED TRIAGE NOTES
Pt arrived on stretcher via EMS with cc of being on prednisone times 3 days. Reports she started to feel bad and got shaky. Reports her blood pressure was elevated and she called her PCP. Who told her to take a statin that she takes nightly which did not bring it down so she was advised to come to ED to get help bringing her Blood pressure down. Pt reports she also has a headache. Pt reports history of HTN and has A-fib. Sees a cardiologist. Breanne Jovel back in couple weeks.

## 2023-10-04 NOTE — ED NOTES
Pt given discharge instructions, patient education, prescriptions, and follow up information. Pt verbalizes understanding. All questions answered. Patient discharged to home in private vehicle, ambulatory. Pt A&Ox4, RA, pain controlled.       Karl Gomez RN  10/03/23 1206

## 2023-10-04 NOTE — ED PROVIDER NOTES
[facility] EMERGENCY DEPT  EMERGENCY DEPARTMENT ENCOUNTER      Pt Name: Meri Pressley  MRN: [id number]  [address] 10/8/1930  Date of evaluation: 10/3/2023  Provider: Kel Darnell DO      HISTORY OF PRESENT ILLNESS      HPI  80-year-old female with history of anxiety, HTN, presents to the emergency department by EMS stating that for the last 3 days she has been taking prednisone for a bullous rash on her left lower leg. She states that since starting the prednisone she has been feeling increased anxiety and shakiness as well as intermittent headaches. She checked her blood pressure this evening and found it to be significantly elevated. She has not taken her evening dose of blood pressure medication yet and she states that she usually also takes a Xanax around this time to help her sleep. She has not taken anything for her headache but denies any vision changes, numbness, weakness. She denies any chest pain, shortness of breath, nausea, vomiting, photophobia, or any other medical concerns. Nursing Notes were reviewed.     REVIEW OF SYSTEMS         Review of Systems        PAST MEDICAL HISTORY     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    Atrophy of vulva 4/22/2009    Generalized anxiety disorder     Hx of bone density study 2/24/14    Normal    Hypertension     Postmenopausal atrophic vaginitis 4/22/2009    Symptomatic menopausal or female climacteric states     Unspecified asthma(493.90)     Unspecified hypothyroidism          SURGICAL HISTORY       Past Surgical History:   Procedure Laterality Date    HYSTERECTOMY, TOTAL ABDOMINAL (CERVIX REMOVED)  1970    W/ USO    OTHER SURGICAL HISTORY      Anterior Posterior Repair    THYROIDECTOMY, PARTIAL      TONSILLECTOMY      UROLOGICAL SURGERY      TVT         CURRENT MEDICATIONS       Previous Medications    ASPIRIN 81 PO    Take 81 mg by mouth    ATORVASTATIN (LIPITOR) 20 MG TABLET    Take 1 tablet by mouth

## 2023-10-04 NOTE — ED NOTES
Pt discharged. Has family member coming to pick her up. Patient has requested to wait in room until ride arrives. Call bell available and instructed to call if any needs present before her ride arrives.      Kathy Garza RN  10/03/23 0328

## 2023-10-05 LAB
EKG DIAGNOSIS: NORMAL
EKG Q-T INTERVAL: 314 MS
EKG QRS DURATION: 72 MS
EKG QTC CALCULATION (BAZETT): 400 MS
EKG R AXIS: -23 DEGREES
EKG T AXIS: 49 DEGREES
EKG VENTRICULAR RATE: 98 BPM

## 2023-10-08 ENCOUNTER — APPOINTMENT (OUTPATIENT)
Facility: HOSPITAL | Age: 88
End: 2023-10-08
Payer: MEDICARE

## 2023-10-08 ENCOUNTER — HOSPITAL ENCOUNTER (EMERGENCY)
Facility: HOSPITAL | Age: 88
Discharge: HOME | End: 2023-10-08
Attending: STUDENT IN AN ORGANIZED HEALTH CARE EDUCATION/TRAINING PROGRAM
Payer: MEDICARE

## 2023-10-08 VITALS
TEMPERATURE: 97.9 F | SYSTOLIC BLOOD PRESSURE: 159 MMHG | HEIGHT: 64 IN | WEIGHT: 135 LBS | HEART RATE: 72 BPM | RESPIRATION RATE: 16 BRPM | OXYGEN SATURATION: 98 % | DIASTOLIC BLOOD PRESSURE: 86 MMHG | BODY MASS INDEX: 23.05 KG/M2

## 2023-10-08 DIAGNOSIS — E87.1 HYPONATREMIA: ICD-10-CM

## 2023-10-08 DIAGNOSIS — R00.2 PALPITATIONS: Primary | ICD-10-CM

## 2023-10-08 DIAGNOSIS — F41.1 ANXIETY STATE: ICD-10-CM

## 2023-10-08 LAB
ALBUMIN SERPL-MCNC: 4.1 G/DL (ref 3.5–5.2)
ALBUMIN/GLOB SERPL: 1.9 (ref 1.1–2.2)
ALP SERPL-CCNC: 80 U/L (ref 35–104)
ALT SERPL-CCNC: 24 U/L (ref 10–35)
ANION GAP SERPL CALC-SCNC: 9 MMOL/L (ref 5–15)
AST SERPL-CCNC: 19 U/L (ref 10–35)
BASOPHILS # BLD: 0 K/UL (ref 0–1)
BASOPHILS NFR BLD: 0 % (ref 0–1)
BILIRUB SERPL-MCNC: 0.7 MG/DL (ref 0.2–1)
BUN SERPL-MCNC: 19 MG/DL (ref 8–23)
BUN/CREAT SERPL: 33 (ref 12–20)
CALCIUM SERPL-MCNC: 9.1 MG/DL (ref 8.2–9.6)
CHLORIDE SERPL-SCNC: 92 MMOL/L (ref 98–107)
CO2 SERPL-SCNC: 26 MMOL/L (ref 22–29)
CREAT SERPL-MCNC: 0.58 MG/DL (ref 0.5–0.9)
DIFFERENTIAL METHOD BLD: ABNORMAL
EKG DIAGNOSIS: NORMAL
EKG Q-T INTERVAL: 346 MS
EKG QRS DURATION: 76 MS
EKG QTC CALCULATION (BAZETT): 378 MS
EKG R AXIS: -21 DEGREES
EKG T AXIS: 65 DEGREES
EKG VENTRICULAR RATE: 72 BPM
EOSINOPHIL # BLD: 0.4 K/UL (ref 0–0.4)
EOSINOPHIL NFR BLD: 4 %
ERYTHROCYTE [DISTWIDTH] IN BLOOD BY AUTOMATED COUNT: 13.1 % (ref 11.5–14.5)
GLOBULIN SER CALC-MCNC: 2.2 G/DL (ref 2–4)
GLUCOSE SERPL-MCNC: 91 MG/DL (ref 65–100)
HCT VFR BLD AUTO: 40.4 % (ref 35–47)
HGB BLD-MCNC: 13.9 G/DL (ref 11.5–16)
IMM GRANULOCYTES # BLD AUTO: 0.1 K/UL (ref 0–0.04)
IMM GRANULOCYTES NFR BLD AUTO: 1 % (ref 0–0.5)
LYMPHOCYTES # BLD: 3.2 K/UL (ref 0.8–3.5)
LYMPHOCYTES NFR BLD: 29 % (ref 12–49)
MAGNESIUM SERPL-MCNC: 1.9 MG/DL (ref 1.7–2.3)
MCH RBC QN AUTO: 31.7 PG (ref 26–34)
MCHC RBC AUTO-ENTMCNC: 34.4 G/DL (ref 30–36.5)
MCV RBC AUTO: 92.2 FL (ref 80–99)
MONOCYTES # BLD: 1.1 K/UL (ref 0–1)
MONOCYTES NFR BLD: 10 % (ref 5–13)
NEUTS SEG # BLD: 6.2 K/UL (ref 1.8–8)
NEUTS SEG NFR BLD: 56 % (ref 32–75)
NRBC # BLD: 0 K/UL (ref 0–0.01)
NRBC BLD-RTO: 0 PER 100 WBC
PLATELET # BLD AUTO: 252 K/UL (ref 150–400)
PMV BLD AUTO: 9.4 FL (ref 8.9–12.9)
POTASSIUM SERPL-SCNC: 4.4 MMOL/L (ref 3.5–5.1)
PROT SERPL-MCNC: 6.3 G/DL (ref 6.4–8.3)
RBC # BLD AUTO: 4.38 M/UL (ref 3.8–5.2)
SODIUM SERPL-SCNC: 127 MMOL/L (ref 136–145)
TROPONIN I BLD-MCNC: <0.04 NG/ML (ref 0–0.08)
WBC # BLD AUTO: 11 K/UL (ref 3.6–11)

## 2023-10-08 PROCEDURE — 80053 COMPREHEN METABOLIC PANEL: CPT

## 2023-10-08 PROCEDURE — 99285 EMERGENCY DEPT VISIT HI MDM: CPT

## 2023-10-08 PROCEDURE — 93010 ELECTROCARDIOGRAM REPORT: CPT | Performed by: SPECIALIST

## 2023-10-08 PROCEDURE — 36415 COLL VENOUS BLD VENIPUNCTURE: CPT

## 2023-10-08 PROCEDURE — 6370000000 HC RX 637 (ALT 250 FOR IP): Performed by: STUDENT IN AN ORGANIZED HEALTH CARE EDUCATION/TRAINING PROGRAM

## 2023-10-08 PROCEDURE — 93005 ELECTROCARDIOGRAM TRACING: CPT | Performed by: STUDENT IN AN ORGANIZED HEALTH CARE EDUCATION/TRAINING PROGRAM

## 2023-10-08 PROCEDURE — 83735 ASSAY OF MAGNESIUM: CPT

## 2023-10-08 PROCEDURE — 71045 X-RAY EXAM CHEST 1 VIEW: CPT

## 2023-10-08 PROCEDURE — 85025 COMPLETE CBC W/AUTO DIFF WBC: CPT

## 2023-10-08 PROCEDURE — 84484 ASSAY OF TROPONIN QUANT: CPT

## 2023-10-08 RX ORDER — ALPRAZOLAM 0.5 MG/1
0.5 TABLET ORAL
Status: COMPLETED | OUTPATIENT
Start: 2023-10-08 | End: 2023-10-08

## 2023-10-08 RX ORDER — METOPROLOL SUCCINATE 25 MG/1
25 TABLET, EXTENDED RELEASE ORAL
Status: COMPLETED | OUTPATIENT
Start: 2023-10-08 | End: 2023-10-08

## 2023-10-08 RX ADMIN — ALPRAZOLAM 0.5 MG: 0.5 TABLET ORAL at 06:24

## 2023-10-08 RX ADMIN — METOPROLOL SUCCINATE 25 MG: 25 TABLET, EXTENDED RELEASE ORAL at 06:57

## 2023-10-08 ASSESSMENT — ENCOUNTER SYMPTOMS: SHORTNESS OF BREATH: 0

## 2023-10-08 NOTE — ED NOTES
Abdomen , soft, nontender, nondistended , no guarding or rigidity , no masses palpable , normal bowel sounds , Liver and Spleen,  no hepatosplenomegaly , liver nontender IV removed prior to dc. Pt given dc paperwork. Pt tolerated dc well. Pt stated understanding of teaching and denied questions. Pt ambulated out of ER with all belongings.      Earl Boles, RN  10/08/23 8574

## 2023-10-08 NOTE — DISCHARGE INSTRUCTIONS
Your sodium was 127 today. This is low. Please follow-up with your primary care doctor to have this rechecked and to determine if any further work-up is necessary. Return to the emergency room with any chest pain or difficulty breathing.

## 2023-10-08 NOTE — ED TRIAGE NOTES
Pt brought in by EMS for hypertension and palpitations. Pt states she feels like her heart is racing. Pt anxious during triage. Pt states she started a new bladder medicine that \"isn't agreeing with her\" and making her blood pressure high. Pt denies cp, sob.

## 2023-10-08 NOTE — ED PROVIDER NOTES
file       Signed By: Maral Michael MD     October 8, 2023        (Please note that portions of this note were completed with a voice recognition program.  Efforts were made to edit the dictations but occasionally words are mis-transcribed.)          Celester MD Sloane  10/08/23 3030       Celester MD Sloane  10/08/23 8038       Celester MD Sloane  10/08/23 3449

## 2023-10-12 ENCOUNTER — TELEPHONE (OUTPATIENT)
Age: 88
End: 2023-10-12

## 2023-10-12 NOTE — TELEPHONE ENCOUNTER
I CALLED THE OFFICE BACK AND DR Melia Rosenberg HERSELF GOT ON THE PHONE SAID SHE WANTED TO TALK TO DR. BUTTERFIELD DIRECTLY BECAUSE SHE HAS CALLED MULTIPLE TIMES AND NO ONE HAS RETURNED HER CALL. , I INFORMED HER THIS WAS THE FIRST MESSAGE I HAD RECEIVED AND WAS TRYING TO GET SOME INFORMATION. SHE SAID SHE DID NOT ASK FOR A NURSE TO CALL HER BACK.      GAVE THE PHONE TO HER NURSE AND SHE ASKED ME WHEN DR BUTTERFIELD HIMSELF WOULD BE AVAILABLE AND I SAID HE WAS IN CLINIC AND I WOULD RELAY THE MESSAGE SHE WANTS HIM TO CALL HER CELL PHONE     [phone]

## 2023-10-12 NOTE — TELEPHONE ENCOUNTER
I spoke with the patient's dermatologist Rosita Mora. 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 She is on low-dose steroids and topical treatment of her foot. The dermatologist was letting me know that sometimes there is bladder involvement with pemphigoid. The patient has incomplete emptying of the bladder, dysuria and urgency. Last cystoscopy did not reveal significant bladder inflammation. No current concern regarding the pemphigoid in the bladder. 9/26/2023 urine culture with Enterococcus in the bladder, resistant to tetracycline. Fungal culture was negative. She was treated with nitrofurantoin. I had started her on bethanechol and advised her on timed voiding. The patient has follow-up on 10/26/2023.     Brandi Baron MD

## 2023-10-12 NOTE — TELEPHONE ENCOUNTER
Dr. Saul Churchill called in regards to pt needing to speak with Dr. Daryn Chavarria about her skin issues that she is having which is causing her to have bladder issues

## 2023-10-13 ENCOUNTER — TELEPHONE (OUTPATIENT)
Age: 88
End: 2023-10-13

## 2023-10-13 NOTE — TELEPHONE ENCOUNTER
Pt lvm wondering if we can perscribe her something to control her urine.  Please advise or if she would need apt

## 2023-10-18 ENCOUNTER — TELEPHONE (OUTPATIENT)
Age: 88
End: 2023-10-18

## 2023-10-18 NOTE — TELEPHONE ENCOUNTER
See previous phone message about this. I advised patient that she should continue with bethanechol and timed voiding and discuss issues with Dr. Vitaly Benson at the 10/26/23 follow up. I have left the patient a voicemail, again stating the same information. I advised her to stop taking the medicaiton if she is having side effects.

## 2023-10-26 ENCOUNTER — OFFICE VISIT (OUTPATIENT)
Age: 88
End: 2023-10-26
Payer: MEDICARE

## 2023-10-26 VITALS — DIASTOLIC BLOOD PRESSURE: 77 MMHG | OXYGEN SATURATION: 99 % | SYSTOLIC BLOOD PRESSURE: 130 MMHG | HEART RATE: 77 BPM

## 2023-10-26 DIAGNOSIS — K59.09 OTHER CONSTIPATION: ICD-10-CM

## 2023-10-26 DIAGNOSIS — Z98.890 HISTORY OF BLADDER SUSPENSION PROCEDURE: ICD-10-CM

## 2023-10-26 DIAGNOSIS — Z91.89 AT RISK OF UTI: ICD-10-CM

## 2023-10-26 DIAGNOSIS — Z78.9 DRUG INTOLERANCE: ICD-10-CM

## 2023-10-26 DIAGNOSIS — N39.490 OVERFLOW INCONTINENCE OF URINE: ICD-10-CM

## 2023-10-26 DIAGNOSIS — R33.9 INCOMPLETE EMPTYING OF BLADDER: Primary | ICD-10-CM

## 2023-10-26 DIAGNOSIS — Z87.448 HISTORY OF BLADDER SUSPENSION PROCEDURE: ICD-10-CM

## 2023-10-26 PROCEDURE — 1123F ACP DISCUSS/DSCN MKR DOCD: CPT | Performed by: NURSE PRACTITIONER

## 2023-10-26 PROCEDURE — 1090F PRES/ABSN URINE INCON ASSESS: CPT | Performed by: NURSE PRACTITIONER

## 2023-10-26 PROCEDURE — 99214 OFFICE O/P EST MOD 30 MIN: CPT | Performed by: NURSE PRACTITIONER

## 2023-10-26 PROCEDURE — 1036F TOBACCO NON-USER: CPT | Performed by: NURSE PRACTITIONER

## 2023-10-26 PROCEDURE — G8427 DOCREV CUR MEDS BY ELIG CLIN: HCPCS | Performed by: NURSE PRACTITIONER

## 2023-10-26 PROCEDURE — G8420 CALC BMI NORM PARAMETERS: HCPCS | Performed by: NURSE PRACTITIONER

## 2023-10-26 PROCEDURE — G8484 FLU IMMUNIZE NO ADMIN: HCPCS | Performed by: NURSE PRACTITIONER

## 2023-10-26 PROCEDURE — 0509F URINE INCON PLAN DOCD: CPT | Performed by: NURSE PRACTITIONER

## 2023-10-26 RX ORDER — FLUOCINONIDE GEL 0.5 MG/G
GEL TOPICAL
COMMUNITY
Start: 2023-10-13

## 2023-10-26 RX ORDER — HYDROXYZINE HYDROCHLORIDE 10 MG/1
TABLET, FILM COATED ORAL
COMMUNITY
Start: 2023-10-05 | End: 2023-10-26 | Stop reason: ALTCHOICE

## 2023-10-26 RX ORDER — CEFUROXIME AXETIL 250 MG/1
250 TABLET ORAL 2 TIMES DAILY
COMMUNITY
Start: 2023-09-14 | End: 2023-10-26 | Stop reason: ALTCHOICE

## 2023-10-26 RX ORDER — DOXYCYCLINE HYCLATE 50 MG/1
CAPSULE ORAL
COMMUNITY
Start: 2023-09-12 | End: 2023-10-26 | Stop reason: ALTCHOICE

## 2023-10-26 RX ORDER — ALPRAZOLAM 0.5 MG/1
0.5 TABLET ORAL 2 TIMES DAILY
COMMUNITY
Start: 2023-10-19

## 2023-10-26 RX ORDER — ALUMINUM ZIRCONIUM OCTACHLOROHYDREX GLY 16 G/100G
GEL TOPICAL
COMMUNITY
Start: 2023-07-19

## 2023-10-26 RX ORDER — KETOCONAZOLE 20 MG/G
CREAM TOPICAL
COMMUNITY
Start: 2023-08-21 | End: 2023-10-26 | Stop reason: ALTCHOICE

## 2023-10-26 RX ORDER — HYDROCORTISONE 25 MG/G
CREAM TOPICAL
COMMUNITY
Start: 2023-10-17

## 2023-10-26 RX ORDER — PREDNISONE 10 MG/1
TABLET ORAL
COMMUNITY
Start: 2023-09-27 | End: 2023-10-26 | Stop reason: ALTCHOICE

## 2023-10-26 NOTE — ASSESSMENT & PLAN NOTE
Overflow incontinence with maybe a small component of urge related incontinence. I would avoid bladder relaxants with incomplete emptying. She notes improvement on bethanechol, but has experienced hypotension. I think the best course of action is to work with her PCP to see if we can alter her BP medications somewhat to allow for the use of bethanechol. I will reach out to Dr. Zach Valentin.

## 2023-10-26 NOTE — ASSESSMENT & PLAN NOTE
Incomplete emptying increases the risk for UTI. She wishes to stay on bethanechol. I will reach out to Dr. Sammy Horner to discuss alteration in her medications that allow for her to stay on the bethanechol. We also discussed adding methenamine due to her risks. She is not overly thrilled about adding 2 large pills per day to her routine. We will consider adding in the future if she continues with recurrent infections. She is concerned about driving to [place] for a urine drop off/appointment if she has symptoms. She is 80 and restricts her driving to a certain range. If she feels symptoms of UTI as we discussed today, she can either drop off a urine specimen here or at [facility].  We can try for a [facility] office visit on Wednesday since it is closer, but she should not wait if she is symptomatic on another day. She can also use her local Urgent Care since that is closer.

## 2023-10-26 NOTE — ASSESSMENT & PLAN NOTE
Incomplete emptying with overflow incontinence. She did not tolerate bethanechol. She did not appear to be hypersuspended on prior examination by Dr. Leonel Maldonado. She is advised again on timed voiding, double voiding. Alternatives to the medication include chronic catheterization (SPT or javier), intermittent catheterization, pelvic floor physical therapy, or referral to Dr. Sadia Jauregui (or alternative) for consideration of nerve stimulator. I would avoid bladder relaxants with incomplete emptying.

## 2023-10-26 NOTE — ASSESSMENT & PLAN NOTE
She has many allergies/intolerances. She is advised to avoid bethanechol in the setting of hypotension but she wishes to stay on the medication. She is instead holding some of her BP medications. Her BP today in clinic is 130/77. She has taken her bethanechol and held one dose of her metoprolol. I have advised her to take her BP medications if she develops hypertension. I'd like to discuss with PCP as well.

## 2023-10-26 NOTE — PROGRESS NOTES
Chief Complaint   Patient presents with    Follow-up    Enuresis    Urinary Retention     Has meds issue. Has BP log. /77   Pulse 77   SpO2 99%      PHQ-9 score is    Negative      1. \"Have you been to the ER, urgent care clinic since your last visit? Hospitalized since your last visit? \" No    2. \"Have you seen or consulted any other health care providers outside of the [address] [place] [address] since your last visit? \" No     3. For patients aged 43-73: Has the patient had a colonoscopy / FIT/ Cologuard? NA - based on age      If the patient is female:    4. For patients aged 43-66: Has the patient had a mammogram within the past 2 years? NA - based on age or sex      11. For patients aged 21-65: Has the patient had a pap smear?  NA - based on age or sex
stool, making it easier to move the bowels. This can help relieve some of the associated urinary symptoms also. Foods high in fiber are fruits, vegetables, grains, nuts, seeds and legumes. Taking a fiber supplement such as Benefiber or Metamucil 1-2 times daily can also help. Use as directed. Return in about 3 months (around 1/26/2024) for TO SEE DR. BUTTERFILED; I will call patient after speaking to Dr. Jena Haynes. FRANCHESKA Culp - HALEY    I spent extensive time discussing medications, side effects, preventative care, review of records, and spoke with her PCP to discuss alterations in current regimen. He will see her to discuss, as early as today. Over 1 hour of time was spent in the delivery of care today. Jeb Ronaldo may have a reminder for a \"due or due soon\" health maintenance. The patient has been encouraged to contact their primary care provider for follow-up on this health maintenance or other necessary and/or routine health screening. Please note that portions of this note were completed with Dragon dictation, the computer voice recognition software. Quite often unanticipated grammatical, syntax, homophones, and other interpretive errors are inadvertently transcribed by the computer software. Please disregard these errors and any other errors that may have escaped final proofreading. Thank you.

## 2023-12-19 ENCOUNTER — TELEPHONE (OUTPATIENT)
Age: 88
End: 2023-12-19

## 2023-12-19 NOTE — TELEPHONE ENCOUNTER
Pt called stating rx of bethanechol (URECHOLINE) 10 MG tablet has started making her lose hair very rapidly. She would like to know if her hair will grow back and if she can be put on a different medication to avoid these side effects. Please advise.

## 2024-01-17 ENCOUNTER — OFFICE VISIT (OUTPATIENT)
Age: 89
End: 2024-01-17
Payer: MEDICARE

## 2024-01-17 VITALS
DIASTOLIC BLOOD PRESSURE: 91 MMHG | TEMPERATURE: 97.1 F | WEIGHT: 135 LBS | SYSTOLIC BLOOD PRESSURE: 157 MMHG | HEIGHT: 64 IN | HEART RATE: 80 BPM | BODY MASS INDEX: 23.05 KG/M2 | OXYGEN SATURATION: 100 %

## 2024-01-17 DIAGNOSIS — N30.00 ACUTE CYSTITIS WITHOUT HEMATURIA: ICD-10-CM

## 2024-01-17 DIAGNOSIS — N39.490 OVERFLOW INCONTINENCE OF URINE: ICD-10-CM

## 2024-01-17 DIAGNOSIS — R35.1 NOCTURIA: ICD-10-CM

## 2024-01-17 DIAGNOSIS — Z78.9 DRUG INTOLERANCE: Primary | ICD-10-CM

## 2024-01-17 DIAGNOSIS — R33.9 INCOMPLETE EMPTYING OF BLADDER: ICD-10-CM

## 2024-01-17 LAB
BILIRUBIN, URINE, POC: NEGATIVE
BLOOD URINE, POC: NORMAL
GLUCOSE URINE, POC: NEGATIVE
KETONES, URINE, POC: NEGATIVE
LEUKOCYTE ESTERASE, URINE, POC: NORMAL
NITRITE, URINE, POC: POSITIVE
PH, URINE, POC: 6.5 (ref 4.6–8)
PROTEIN,URINE, POC: NEGATIVE
PVR, POC: NORMAL CC
SPECIFIC GRAVITY, URINE, POC: 1.01 (ref 1–1.03)
URINALYSIS CLARITY, POC: CLEAR
URINALYSIS COLOR, POC: YELLOW
UROBILINOGEN, POC: NORMAL

## 2024-01-17 PROCEDURE — G8484 FLU IMMUNIZE NO ADMIN: HCPCS | Performed by: UROLOGY

## 2024-01-17 PROCEDURE — 99214 OFFICE O/P EST MOD 30 MIN: CPT | Performed by: UROLOGY

## 2024-01-17 PROCEDURE — G8427 DOCREV CUR MEDS BY ELIG CLIN: HCPCS | Performed by: UROLOGY

## 2024-01-17 PROCEDURE — 81003 URINALYSIS AUTO W/O SCOPE: CPT | Performed by: UROLOGY

## 2024-01-17 PROCEDURE — 1036F TOBACCO NON-USER: CPT | Performed by: UROLOGY

## 2024-01-17 PROCEDURE — 0509F URINE INCON PLAN DOCD: CPT | Performed by: UROLOGY

## 2024-01-17 PROCEDURE — 1123F ACP DISCUSS/DSCN MKR DOCD: CPT | Performed by: UROLOGY

## 2024-01-17 PROCEDURE — 1090F PRES/ABSN URINE INCON ASSESS: CPT | Performed by: UROLOGY

## 2024-01-17 PROCEDURE — G8420 CALC BMI NORM PARAMETERS: HCPCS | Performed by: UROLOGY

## 2024-01-17 PROCEDURE — 51798 US URINE CAPACITY MEASURE: CPT | Performed by: UROLOGY

## 2024-01-17 RX ORDER — BETHANECHOL CHLORIDE 10 MG/1
10 TABLET ORAL 4 TIMES DAILY
Qty: 360 TABLET | Refills: 3 | Status: SHIPPED | OUTPATIENT
Start: 2024-01-17 | End: 2025-01-16

## 2024-01-17 NOTE — PROGRESS NOTES
Chief Complaint   Patient presents with    Follow-up    Bladder Problem     Incomplete emptying     Incontinence     Overflow      1. Have you been to the ER, urgent care clinic since your last visit?  Hospitalized since your last visit?No    2. Have you seen or consulted any other health care providers outside of the [facility] since your last visit?  Include any pap smears or colon screening. No  BP (!) 157/91 (Site: Right Upper Arm, Position: Sitting, Cuff Size: Medium Adult)   Pulse 80   Temp 97.1 °F (36.2 °C) (Temporal)   Ht 1.626 m (5' 4\")   Wt 61.2 kg (135 lb)   SpO2 100%   BMI 23.17 kg/m²     
for this.    Orders:  -     AMB POC URINALYSIS DIP STICK AUTO W/O MICRO  -     AMB POC PVR, JULES,POST-VOID RES,US,NON-IMAGING  -     Culture, Urine  -     Urinalysis with Microscopic  -     bethanechol (URECHOLINE) 10 MG tablet; Take 1 tablet by mouth 4 times daily, Disp-360 tablet, R-3Normal  4. Acute cystitis without hematuria  Overview:  HX of recurrent UTI.  Many drug allergies or intolerances.  Incomplete emptying is likely contributory.  Assessment & Plan:  Recurrent UTIs.  Probable UTI now.  Multiple drug intolerance and incomplete emptying.       Orders:  -     AMB POC URINALYSIS DIP STICK AUTO W/O MICRO  -     AMB POC PVR, JULES,POST-VOID RES,US,NON-IMAGING  -     Culture, Urine  -     Urinalysis with Microscopic  5. Nocturia  Overview:  4-5 times/night.  Assessment & Plan:  Bothersome nocturia.  She is instructed on fluid restriction.  She can elevate her legs in the evening or have a soak.       Allergies   Allergen Reactions    Ciprofloxacin Hives    Seasonal Other (See Comments)     Mold, Mildew, Pollen, Soy---Congestion of nasal area    Sulfa Antibiotics      Other reaction(s): Unknown (comments)    Sulfur Hives    Thiazide-Type Diuretics Other (See Comments)    Amoxicillin Rash    Peanut (Diagnostic) Rash    Sulfur Dioxide Other (See Comments)      Prior to Admission medications    Medication Sig Start Date End Date Taking? Authorizing Provider   bethanechol (URECHOLINE) 10 MG tablet Take 1 tablet by mouth 4 times daily 1/17/24 1/16/25 Yes Armani Groves MD   ALPRAZolam (XANAX) 0.5 MG tablet Take 1 tablet by mouth 2 times daily. 10/19/23  Yes Patience Peralta MD   METAMUCIL SMOOTH TEXTURE 58.6 % powder USE 1 SCOOP AS DIRECTED ONCE DAILY 7/19/23  Yes Patience Peralta MD   mupirocin (BACTROBAN) 2 % ointment APPLY OINTMENT ONCE DAILY TO AFFECTED AREA ON LEFT LOWER LEG 10/19/23  Yes Patience Peralta MD   hydrocortisone (ANUSOL-HC) 2.5 % CREA rectal cream USE AS DIRECTED TWICE A DAY 10/17/23

## 2024-01-17 NOTE — ASSESSMENT & PLAN NOTE
Somewhat improved with bethanechol.  We discussed a temporary javier to treat her infection.  She is amenable to this.  We will wait for her culture and have her return for this.

## 2024-01-17 NOTE — ASSESSMENT & PLAN NOTE
Bothersome nocturia.  She is instructed on fluid restriction.  She can elevate her legs in the evening or have a soak.

## 2024-01-18 LAB
APPEARANCE UR: ABNORMAL
BACTERIA #/AREA URNS HPF: ABNORMAL /[HPF]
BILIRUB UR QL STRIP: NEGATIVE
CASTS URNS QL MICRO: ABNORMAL /LPF
COLOR UR: YELLOW
EPI CELLS #/AREA URNS HPF: ABNORMAL /HPF (ref 0–10)
GLUCOSE UR QL STRIP: NEGATIVE
HGB UR QL STRIP: NEGATIVE
KETONES UR QL STRIP: NEGATIVE
LEUKOCYTE ESTERASE UR QL STRIP: ABNORMAL
MICRO URNS: ABNORMAL
NITRITE UR QL STRIP: POSITIVE
PH UR STRIP: 6.5 [PH] (ref 5–7.5)
PROT UR QL STRIP: NEGATIVE
RBC #/AREA URNS HPF: ABNORMAL /HPF (ref 0–2)
SP GR UR STRIP: 1.01 (ref 1–1.03)
UROBILINOGEN UR STRIP-MCNC: 0.2 MG/DL (ref 0.2–1)
WBC #/AREA URNS HPF: >30 /HPF (ref 0–5)

## 2024-01-19 LAB — BACTERIA UR CULT: ABNORMAL

## 2024-01-21 RX ORDER — NITROFURANTOIN 25; 75 MG/1; MG/1
100 CAPSULE ORAL 2 TIMES DAILY
Qty: 14 CAPSULE | Refills: 0 | Status: SHIPPED | OUTPATIENT
Start: 2024-01-21 | End: 2024-01-28

## 2024-01-24 ENCOUNTER — OFFICE VISIT (OUTPATIENT)
Age: 89
End: 2024-01-24
Payer: MEDICARE

## 2024-01-24 VITALS
SYSTOLIC BLOOD PRESSURE: 147 MMHG | HEART RATE: 91 BPM | BODY MASS INDEX: 23.05 KG/M2 | OXYGEN SATURATION: 97 % | WEIGHT: 135 LBS | HEIGHT: 64 IN | RESPIRATION RATE: 14 BRPM | DIASTOLIC BLOOD PRESSURE: 81 MMHG

## 2024-01-24 DIAGNOSIS — N39.490 OVERFLOW INCONTINENCE OF URINE: Primary | ICD-10-CM

## 2024-01-24 DIAGNOSIS — R33.9 INCOMPLETE EMPTYING OF BLADDER: ICD-10-CM

## 2024-01-24 DIAGNOSIS — N30.00 ACUTE CYSTITIS WITHOUT HEMATURIA: ICD-10-CM

## 2024-01-24 PROCEDURE — G8484 FLU IMMUNIZE NO ADMIN: HCPCS | Performed by: UROLOGY

## 2024-01-24 PROCEDURE — G8427 DOCREV CUR MEDS BY ELIG CLIN: HCPCS | Performed by: UROLOGY

## 2024-01-24 PROCEDURE — G8420 CALC BMI NORM PARAMETERS: HCPCS | Performed by: UROLOGY

## 2024-01-24 PROCEDURE — 1036F TOBACCO NON-USER: CPT | Performed by: UROLOGY

## 2024-01-24 PROCEDURE — 1090F PRES/ABSN URINE INCON ASSESS: CPT | Performed by: UROLOGY

## 2024-01-24 PROCEDURE — 1123F ACP DISCUSS/DSCN MKR DOCD: CPT | Performed by: UROLOGY

## 2024-01-24 PROCEDURE — 0509F URINE INCON PLAN DOCD: CPT | Performed by: UROLOGY

## 2024-01-24 PROCEDURE — 99214 OFFICE O/P EST MOD 30 MIN: CPT | Performed by: UROLOGY

## 2024-01-24 NOTE — ASSESSMENT & PLAN NOTE
Incomplete emptying of the bladder will affect her ability to clear urine infection.  A King catheter today yielded 275 cc residual.  Will plan on the King for a few days and have recommended for removal.  Continue antibiotics.

## 2024-01-24 NOTE — PROGRESS NOTES
HISTORY OF PRESENT ILLNESS  Zoey Sneed is a 89 y.o. female.   has a past medical history of Acquired absence of both cervix and uterus, Atrophy of vulva, Generalized anxiety disorder, Hx of bone density study, Hypertension, Postmenopausal atrophic vaginitis, Symptomatic menopausal or female climacteric states, Unspecified asthma(493.90), and Unspecified hypothyroidism.  has a past surgical history that includes Thyroidectomy, partial; Hysterectomy, total abdominal (1970); Tonsillectomy; Urological Surgery; and other surgical history.  Chief Complaint   Patient presents with    Follow-up     Javier cath      She is 89 years old and lives independently in a senior living community.    She had Aerococcus in the urine.  She started nitrofurantoin few days ago because it went to a different pharmacy that she expected.  She feels her leakage has improved.  She has incomplete emptying of the bladder and we will use a javier for a few days to help clear her urine.     She was seen a week ago with symptoms of incomplete emptying.  She had a better response to bethanechol initially but it is less of a pronounced effect.    She is more bothered by nocturia x 4.  She notes stinging with an urge to void.  It is not during voiding.  She is bothered because it interrupts her sleep.       She can have leakage of urine.  She wears a pad.  It can be 2-3 days per week of wetness.  She can leak a little with cough or sneeze.           1. Overflow incontinence of urine  Overview:  Overflow incontinence with some component of urge.   Assessment & Plan:  Urge and overflow incontinence improved with treatment of UTI. Javier placed today for incomplete emptying.   2. Incomplete emptying of bladder  Overview:  Unable to void with >450 cc in the bladder on evaluation.  She was advised on timed voiding and started on bethanechol.  She has had multiple issues with side effects and has not been able to consistently take the medication.  Assessment &

## 2024-01-24 NOTE — PROGRESS NOTES
Chief Complaint   Patient presents with    Follow-up     King cath      1. Have you been to the ER, urgent care clinic since your last visit?  Hospitalized since your last visit?No    2. Have you seen or consulted any other health care providers outside of the [facility] since your last visit?  Include any pap smears or colon screening. No  BP (!) 147/81 (Site: Right Upper Arm, Position: Sitting, Cuff Size: Medium Adult)   Pulse 91   Resp 14   Ht 1.626 m (5' 4\")   Wt 61.2 kg (135 lb)   SpO2 97%   BMI 23.17 kg/m²

## 2024-01-24 NOTE — ASSESSMENT & PLAN NOTE
UTI with Aerococcus.  On nitrofurantoin.  King catheter placed to aid with clearance of infection.  She feels like she is improving.  No side effects noted with antibiotics.

## 2024-01-24 NOTE — ASSESSMENT & PLAN NOTE
Urge and overflow incontinence improved with treatment of UTI. King placed today for incomplete emptying.

## 2024-01-26 ENCOUNTER — OFFICE VISIT (OUTPATIENT)
Age: 89
End: 2024-01-26
Payer: MEDICARE

## 2024-01-26 VITALS
RESPIRATION RATE: 14 BRPM | HEIGHT: 64 IN | HEART RATE: 79 BPM | BODY MASS INDEX: 23.05 KG/M2 | DIASTOLIC BLOOD PRESSURE: 59 MMHG | WEIGHT: 135 LBS | SYSTOLIC BLOOD PRESSURE: 109 MMHG | OXYGEN SATURATION: 97 %

## 2024-01-26 DIAGNOSIS — N30.00 ACUTE CYSTITIS WITHOUT HEMATURIA: Primary | ICD-10-CM

## 2024-01-26 DIAGNOSIS — R33.9 INCOMPLETE EMPTYING OF BLADDER: ICD-10-CM

## 2024-01-26 DIAGNOSIS — N39.490 OVERFLOW INCONTINENCE OF URINE: ICD-10-CM

## 2024-01-26 PROCEDURE — 99214 OFFICE O/P EST MOD 30 MIN: CPT | Performed by: UROLOGY

## 2024-01-26 PROCEDURE — 1036F TOBACCO NON-USER: CPT | Performed by: UROLOGY

## 2024-01-26 PROCEDURE — 1090F PRES/ABSN URINE INCON ASSESS: CPT | Performed by: UROLOGY

## 2024-01-26 PROCEDURE — G8420 CALC BMI NORM PARAMETERS: HCPCS | Performed by: UROLOGY

## 2024-01-26 PROCEDURE — G8484 FLU IMMUNIZE NO ADMIN: HCPCS | Performed by: UROLOGY

## 2024-01-26 PROCEDURE — 0509F URINE INCON PLAN DOCD: CPT | Performed by: UROLOGY

## 2024-01-26 PROCEDURE — G8427 DOCREV CUR MEDS BY ELIG CLIN: HCPCS | Performed by: UROLOGY

## 2024-01-26 PROCEDURE — 1123F ACP DISCUSS/DSCN MKR DOCD: CPT | Performed by: UROLOGY

## 2024-01-26 NOTE — PROGRESS NOTES
Chief Complaint   Patient presents with    Follow-up     Cath removal     1. Have you been to the ER, urgent care clinic since your last visit?  Hospitalized since your last visit?No    2. Have you seen or consulted any other health care providers outside of the [facility] since your last visit?  Include any pap smears or colon screening. No  BP (!) 109/59 (Site: Left Upper Arm, Position: Sitting, Cuff Size: Medium Adult)   Pulse 79   Resp 14   Ht 1.626 m (5' 4\")   Wt 61.2 kg (135 lb)   SpO2 97%   BMI 23.17 kg/m²

## 2024-01-29 NOTE — PROGRESS NOTES
HISTORY OF PRESENT ILLNESS  Zoey Sneed is a 89 y.o. female.   has a past medical history of Acquired absence of both cervix and uterus, Atrophy of vulva, Generalized anxiety disorder, Hx of bone density study, Hypertension, Postmenopausal atrophic vaginitis, Symptomatic menopausal or female climacteric states, Unspecified asthma(493.90), and Unspecified hypothyroidism.  has a past surgical history that includes Thyroidectomy, partial; Hysterectomy, total abdominal (1970); Tonsillectomy; Urological Surgery; and other surgical history.  Chief Complaint   Patient presents with    Follow-up     Cath removal     She had a UTI and has incomplete emptying.  A King catheter was placed 2 days prior.  She noticed significant diuresis overnight.  She is not bothered by the catheter.  We discussed removal.  She had thought about it and would like the catheter out.        1. Acute cystitis without hematuria  Assessment & Plan:  Doing well on antibiotics.  Continue nitrofurantoin.  She feels like she is improving.  No side effects noted.  2. Incomplete emptying of bladder  Overview:  Unable to void with >450 cc in the bladder on evaluation.  She was advised on timed voiding and started on bethanechol.  She has had multiple issues with side effects and has not been able to consistently take the medication.  Assessment & Plan:   King catheter placed 2 days ago because of incomplete emptying and a UTI.  Urine appears clear.  The King catheter was removed today.  3. Overflow incontinence of urine  Overview:  Overflow incontinence with some component of urge.   Assessment & Plan:   We will observe her urinary pattern after antibiotic therapy.      Allergies   Allergen Reactions    Ciprofloxacin Hives    Seasonal Other (See Comments)     Mold, Mildew, Pollen, Soy---Congestion of nasal area    Sulfa Antibiotics      Other reaction(s): Unknown (comments)    Sulfur Hives    Thiazide-Type Diuretics Other (See Comments)    Amoxicillin  [No change in the review of systems as noted in prior visit date ___] : No change in the review of systems as noted in prior visit date of [unfilled] [de-identified] : 16 month old with cleft lip, cleft palate and hearing loss \par ex 34 week female twin with history of cleft palate, microphthalmia , PDA, developmental delay, FTT here for follow up of bilateral VUR, GT\par GT/ PO- takes about 8oz - 12 oz per day by mouth, starting to take some purees, Has a lot of reflux - improved on meds but worse in the morning \par May need kidney surgery- will f/u with urologist \par \par OPERATION:Bilateral myringotomy with tube placement, auditory brainstem response with Dr. Nowak \par ABR- Tone specific Auditory Brainstem Response testing revealed hearing within normal limits at 2000Hz and 4000Hz in the right ear.\par Testing in the left ear revealed a mild hearing loss at 500Hz, a mixed sensorineural hearing loss at 2000Hz and a mild sensorineural hearing loss at 4000Hz. \par \par Hearing aid approved on left ear still waiting on hearing aid. \par \par Cleft palate May 12th \par MRI does not show any inner ear anomaly to explain the hearing loss, middle ear effusion \par \par Mama, Hi and dameon \par Crawling and climbing

## 2024-01-29 NOTE — ASSESSMENT & PLAN NOTE
Doing well on antibiotics.  Continue nitrofurantoin.  She feels like she is improving.  No side effects noted.

## 2024-01-29 NOTE — ASSESSMENT & PLAN NOTE
King catheter placed 2 days ago because of incomplete emptying and a UTI.  Urine appears clear.  The King catheter was removed today.

## 2024-01-31 NOTE — PATIENT INSTRUCTIONS
WHAT YOU CAN DO TO PREVENT UTI's:    Diabetic control: Poor glycemic control can contribute to voiding dysfunction increase the risk for UTI.  Good glycemic control and regular follow up is recommended.  Schedule an appointment with your PCP or Endocrinologist.    Smoking: tobacco irritates the bladder lining.  That irritation can make voiding symptoms worse (incontinence, overactivity, and urinary frequency in particular).    Additionally, there is an increased risk for recurrent UTI or the development of bladder cancer when you smoke.    Hydration: Dehydration is a contributory factor to UTI.  Adequate hydration helps to flush the urinary tract of infecting organisms and the subsequent frequent voiding reduces the opportunity for bacterial multiplication.      Cranberry: supplementation with a quality cranberry product can decrease the amount of UTI's.    Cranberries contain something called PAC's (A-type proanthocyanidins) which interfere with the ability of the bacteria to adhere to the the bladder wall.  Many juices and supplements do not have enough of this active ingredient, so it is important to purchase a good, quality product. You need 36mg or more of the proanthocyanidins for it to be an effective supplement.    You also want to avoid juices high in sugar.  Bacteria feed on sugar so it is better to avoid sugary Cranberry beverages because they may actually make infection worse.    TheraCran from [employer] is a good quality product.  You can find more information about the product at the following web address. https://[employer]/products/theracran-one-cranberry-capsules    Hygiene: You may note some benefit to using Theraworx Protect, an antimicrobial wipe designed particularly for post-menopausal women.  It includes colloidal silver (a topical antiseptic).       THERAWORX PROTECT    Urinary tract infections (UTIs) are on of the most common outpatient infections in the [place].      The good

## 2024-02-08 ENCOUNTER — HOSPITAL ENCOUNTER (EMERGENCY)
Facility: HOSPITAL | Age: 89
Discharge: HOME | End: 2024-02-08
Attending: EMERGENCY MEDICINE
Payer: MEDICARE

## 2024-02-08 ENCOUNTER — APPOINTMENT (OUTPATIENT)
Facility: HOSPITAL | Age: 89
End: 2024-02-08
Payer: MEDICARE

## 2024-02-08 VITALS
WEIGHT: 122 LBS | DIASTOLIC BLOOD PRESSURE: 112 MMHG | OXYGEN SATURATION: 79 % | HEIGHT: 64 IN | HEART RATE: 103 BPM | BODY MASS INDEX: 20.83 KG/M2 | RESPIRATION RATE: 27 BRPM | SYSTOLIC BLOOD PRESSURE: 195 MMHG | TEMPERATURE: 97.9 F

## 2024-02-08 DIAGNOSIS — R91.1 PULMONARY NODULE: ICD-10-CM

## 2024-02-08 DIAGNOSIS — I10 HYPERTENSION, UNSPECIFIED TYPE: ICD-10-CM

## 2024-02-08 DIAGNOSIS — N30.00 ACUTE CYSTITIS WITHOUT HEMATURIA: ICD-10-CM

## 2024-02-08 DIAGNOSIS — R06.02 SHORTNESS OF BREATH: Primary | ICD-10-CM

## 2024-02-08 LAB
ALBUMIN SERPL-MCNC: 4.4 G/DL (ref 3.5–5.2)
ALBUMIN/GLOB SERPL: 1.6 (ref 1.1–2.2)
ALP SERPL-CCNC: 92 U/L (ref 35–104)
ALT SERPL-CCNC: 9 U/L (ref 10–35)
ANION GAP SERPL CALC-SCNC: 13 MMOL/L (ref 5–15)
APPEARANCE UR: ABNORMAL
AST SERPL-CCNC: 26 U/L (ref 10–35)
BACTERIA URNS QL MICRO: ABNORMAL /HPF
BASOPHILS # BLD: 0.1 K/UL (ref 0–1)
BASOPHILS NFR BLD: 1 % (ref 0–1)
BILIRUB SERPL-MCNC: 0.5 MG/DL (ref 0.2–1)
BILIRUB UR QL: NEGATIVE
BUN SERPL-MCNC: 13 MG/DL (ref 8–23)
BUN/CREAT SERPL: 21 (ref 12–20)
CALCIUM SERPL-MCNC: 9.5 MG/DL (ref 8.2–9.6)
CHLORIDE SERPL-SCNC: 94 MMOL/L (ref 98–107)
CO2 SERPL-SCNC: 25 MMOL/L (ref 22–29)
COLOR UR: ABNORMAL
CREAT SERPL-MCNC: 0.61 MG/DL (ref 0.5–0.9)
DIFFERENTIAL METHOD BLD: ABNORMAL
EKG DIAGNOSIS: NORMAL
EKG Q-T INTERVAL: 318 MS
EKG QRS DURATION: 76 MS
EKG QTC CALCULATION (BAZETT): 395 MS
EKG R AXIS: -37 DEGREES
EKG T AXIS: 51 DEGREES
EKG VENTRICULAR RATE: 93 BPM
EOSINOPHIL # BLD: 0.6 K/UL (ref 0–0.4)
EOSINOPHIL NFR BLD: 7 %
EPITH CASTS URNS QL MICRO: ABNORMAL /LPF
ERYTHROCYTE [DISTWIDTH] IN BLOOD BY AUTOMATED COUNT: 13.2 % (ref 11.5–14.5)
GLOBULIN SER CALC-MCNC: 2.8 G/DL (ref 2–4)
GLUCOSE SERPL-MCNC: 109 MG/DL (ref 65–100)
GLUCOSE UR STRIP.AUTO-MCNC: NEGATIVE MG/DL
HCT VFR BLD AUTO: 42.2 % (ref 35–47)
HGB BLD-MCNC: 14 G/DL (ref 11.5–16)
HGB UR QL STRIP: ABNORMAL
IMM GRANULOCYTES # BLD AUTO: 0 K/UL (ref 0–0.04)
IMM GRANULOCYTES NFR BLD AUTO: 0 % (ref 0–0.5)
KETONES UR QL STRIP.AUTO: NEGATIVE MG/DL
LEUKOCYTE ESTERASE UR QL STRIP.AUTO: ABNORMAL
LYMPHOCYTES # BLD: 2.1 K/UL (ref 0.8–3.5)
LYMPHOCYTES NFR BLD: 23 % (ref 12–49)
MCH RBC QN AUTO: 30.9 PG (ref 26–34)
MCHC RBC AUTO-ENTMCNC: 33.2 G/DL (ref 30–36.5)
MCV RBC AUTO: 93.2 FL (ref 80–99)
MONOCYTES # BLD: 0.8 K/UL (ref 0–1)
MONOCYTES NFR BLD: 8 % (ref 5–13)
NEUTS SEG # BLD: 5.6 K/UL (ref 1.8–8)
NEUTS SEG NFR BLD: 61 % (ref 32–75)
NITRITE UR QL STRIP.AUTO: NEGATIVE
NRBC # BLD: 0 K/UL (ref 0–0.01)
NRBC BLD-RTO: 0 PER 100 WBC
NT PRO BNP: 1270 PG/ML
PH UR STRIP: 7 (ref 5–8)
PLATELET # BLD AUTO: 266 K/UL (ref 150–400)
PMV BLD AUTO: 10.3 FL (ref 8.9–12.9)
POTASSIUM SERPL-SCNC: 4.2 MMOL/L (ref 3.5–5.1)
PROT SERPL-MCNC: 7.2 G/DL (ref 6.4–8.3)
PROT UR STRIP-MCNC: NEGATIVE MG/DL
RBC # BLD AUTO: 4.53 M/UL (ref 3.8–5.2)
RBC #/AREA URNS HPF: ABNORMAL /HPF
SODIUM SERPL-SCNC: 132 MMOL/L (ref 136–145)
SP GR UR REFRACTOMETRY: 1.01 (ref 1–1.03)
TROPONIN I BLD-MCNC: <0.04 NG/ML (ref 0–0.08)
URINE CULTURE IF INDICATED: ABNORMAL
UROBILINOGEN UR QL STRIP.AUTO: 0.2 EU/DL (ref 0.2–1)
WBC # BLD AUTO: 9.2 K/UL (ref 3.6–11)
WBC URNS QL MICRO: >100 /HPF (ref 0–4)

## 2024-02-08 PROCEDURE — 71275 CT ANGIOGRAPHY CHEST: CPT

## 2024-02-08 PROCEDURE — 87086 URINE CULTURE/COLONY COUNT: CPT

## 2024-02-08 PROCEDURE — 84484 ASSAY OF TROPONIN QUANT: CPT

## 2024-02-08 PROCEDURE — 87077 CULTURE AEROBIC IDENTIFY: CPT

## 2024-02-08 PROCEDURE — 6360000004 HC RX CONTRAST MEDICATION: Performed by: EMERGENCY MEDICINE

## 2024-02-08 PROCEDURE — 87186 SC STD MICRODIL/AGAR DIL: CPT

## 2024-02-08 PROCEDURE — 96374 THER/PROPH/DIAG INJ IV PUSH: CPT

## 2024-02-08 PROCEDURE — 81001 URINALYSIS AUTO W/SCOPE: CPT

## 2024-02-08 PROCEDURE — 80053 COMPREHEN METABOLIC PANEL: CPT

## 2024-02-08 PROCEDURE — 36415 COLL VENOUS BLD VENIPUNCTURE: CPT

## 2024-02-08 PROCEDURE — 83880 ASSAY OF NATRIURETIC PEPTIDE: CPT

## 2024-02-08 PROCEDURE — 6360000002 HC RX W HCPCS: Performed by: EMERGENCY MEDICINE

## 2024-02-08 PROCEDURE — 93005 ELECTROCARDIOGRAM TRACING: CPT | Performed by: EMERGENCY MEDICINE

## 2024-02-08 PROCEDURE — 85025 COMPLETE CBC W/AUTO DIFF WBC: CPT

## 2024-02-08 PROCEDURE — 99285 EMERGENCY DEPT VISIT HI MDM: CPT

## 2024-02-08 PROCEDURE — 2580000003 HC RX 258: Performed by: EMERGENCY MEDICINE

## 2024-02-08 PROCEDURE — 93010 ELECTROCARDIOGRAM REPORT: CPT | Performed by: SPECIALIST

## 2024-02-08 RX ORDER — CEFDINIR 300 MG/1
300 CAPSULE ORAL 2 TIMES DAILY
Qty: 14 CAPSULE | Refills: 0 | Status: SHIPPED | OUTPATIENT
Start: 2024-02-08 | End: 2024-02-15

## 2024-02-08 RX ADMIN — IOPAMIDOL 100 ML: 755 INJECTION, SOLUTION INTRAVENOUS at 11:59

## 2024-02-08 RX ADMIN — WATER 1000 MG: 1 INJECTION INTRAMUSCULAR; INTRAVENOUS; SUBCUTANEOUS at 13:27

## 2024-02-08 ASSESSMENT — LIFESTYLE VARIABLES
HOW OFTEN DO YOU HAVE A DRINK CONTAINING ALCOHOL: NEVER
HOW MANY STANDARD DRINKS CONTAINING ALCOHOL DO YOU HAVE ON A TYPICAL DAY: PATIENT DOES NOT DRINK

## 2024-02-08 NOTE — ED PROVIDER NOTES
General: She is not in acute distress.  Cardiovascular:      Rate and Rhythm: Normal rate. Rhythm irregular.   Pulmonary:      Effort: No respiratory distress.      Breath sounds: Normal breath sounds.   Musculoskeletal:      Right lower leg: No edema.      Left lower leg: No edema.   Neurological:      Mental Status: She is alert.             EMERGENCY DEPARTMENT COURSE and DIFFERENTIAL DIAGNOSIS/MDM:   Vitals:    Vitals:    02/08/24 1025   BP: (!) 197/117   Pulse: 84   Resp: 28   SpO2: 96%   Weight: 55.3 kg (122 lb)   Height: 1.626 m (5' 4\")         Medical Decision Making  89-year-old female presents to the emergency department above with a chief complaint of shortness of breath.  She has A-fib which is known.  She recently started blood thinners.  She reports a difficulty with frequent UTIs.  BNP is moderately elevated as would be consistent with A-fib.  There is no significant pulmonary edema or parenchymal lung disease on CT scan.  No PE.  She does have evidence of UTI, potentially this is contributing to her symptomatology.  Will treat with antibiotics.  No indications for hospitalization today.  Noted to be hypertensive without hypertensive emergency today.  Recommend follow-up with primary care.  Recommend follow-up with primary care, return if worsens.    Amount and/or Complexity of Data Reviewed  Labs: ordered.  Radiology: ordered and independent interpretation performed. Decision-making details documented in ED Course.  ECG/medicine tests: ordered.    Risk  Prescription drug management.            REASSESSMENT     ED Course as of 02/08/24 1242   Thu Feb 08, 2024   1024 ED EKG interpretation: Rhythm: Atrial fibrillation. Rate: 93. Axis: Leftward axis. ST Segment: No concerning ST segment changes. This EKG was interpreted realtime by James Gore MD, ED physician [JM]   1203 I have independently viewed the obtained radiographic images and note CT chest without PE or edema.  There are nodules present.  -

## 2024-02-08 NOTE — ED TRIAGE NOTES
PT ambulatory to ED with cane, PT has been having SOB with exertion and hypertension with a bp of 167/97 at home. PT did a breathing treatment last night and used her inhaler today with no results. PT has hx of A fib.

## 2024-02-08 NOTE — DISCHARGE INSTRUCTIONS
We are going to treat you for a urinary tract infection.  We hope this will help you feel better.  You do have pulmonary nodules on your CT scan.  These should be followed up by your primary care doctor

## 2024-02-09 ENCOUNTER — OFFICE VISIT (OUTPATIENT)
Age: 89
End: 2024-02-09

## 2024-02-09 VITALS
BODY MASS INDEX: 20.83 KG/M2 | WEIGHT: 122 LBS | SYSTOLIC BLOOD PRESSURE: 134 MMHG | RESPIRATION RATE: 16 BRPM | OXYGEN SATURATION: 97 % | DIASTOLIC BLOOD PRESSURE: 75 MMHG | HEART RATE: 89 BPM | HEIGHT: 64 IN

## 2024-02-09 DIAGNOSIS — N30.00 ACUTE CYSTITIS WITHOUT HEMATURIA: ICD-10-CM

## 2024-02-09 DIAGNOSIS — R33.9 INCOMPLETE EMPTYING OF BLADDER: ICD-10-CM

## 2024-02-09 DIAGNOSIS — N39.490 OVERFLOW INCONTINENCE OF URINE: Primary | ICD-10-CM

## 2024-02-09 LAB
BILIRUBIN, URINE, POC: NEGATIVE
BLOOD URINE, POC: NORMAL
GLUCOSE URINE, POC: NEGATIVE
KETONES, URINE, POC: NEGATIVE
LEUKOCYTE ESTERASE, URINE, POC: NORMAL
NITRITE, URINE, POC: NEGATIVE
PH, URINE, POC: 6.5 (ref 4.6–8)
PROTEIN,URINE, POC: NEGATIVE
PVR, POC: NORMAL CC
SPECIFIC GRAVITY, URINE, POC: 1.01 (ref 1–1.03)
URINALYSIS CLARITY, POC: CLEAR
URINALYSIS COLOR, POC: YELLOW
UROBILINOGEN, POC: NORMAL

## 2024-02-09 RX ORDER — CEFDINIR 300 MG/1
300 CAPSULE ORAL 2 TIMES DAILY
Qty: 14 CAPSULE | Refills: 0 | Status: SHIPPED | OUTPATIENT
Start: 2024-02-09 | End: 2024-02-16

## 2024-02-09 NOTE — ASSESSMENT & PLAN NOTE
Incomplete emptying.  Residuals lower on bethanechol at 258 (from 450) but still a concern with UTI.  Discussed CIC vs javier.  She felt more comfortable with an indwelling javier.  SAMANTHA said she could learn self cath if needed.      Plan javier 3-4 days and then d/c.

## 2024-02-09 NOTE — PROGRESS NOTES
Chief Complaint   Patient presents with    Follow-up    Incontinence    imcomplete emptying     1. Have you been to the ER, urgent care clinic since your last visit?  Hospitalized since your last visit? Yes Hypertension, bladder infection, shortness of breath    2. Have you seen or consulted any other health care providers outside of the [facility] since your last visit?  Include any pap smears or colon screening. No  /75 (Site: Left Upper Arm, Position: Sitting, Cuff Size: Medium Adult)   Pulse 89   Resp 16   Ht 1.626 m (5' 4\")   Wt 55.3 kg (122 lb)   SpO2 97%   BMI 20.94 kg/m²       
tenderness.       Urethra: No prolapse, urethral pain or urethral swelling.      Comments: Narrow introitus  Neurological:      General: No focal deficit present.       16F javier placed.  Urine clear yellow.  Cath urine sent for culture.       ASSESSMENT and PLAN  1. Overflow incontinence of urine  Assessment & Plan:  Overflow rather than urge incontinence.  She is surprised how much urine she makes overnight.  She voids at least two 600cc leg bags.  Bethanechol may help with emptying.    Orders:  -     AMB POC URINALYSIS DIP STICK AUTO W/O MICRO  -     AMB POC PVR, JULES,POST-VOID RES,US,NON-IMAGING  2. Acute cystitis without hematuria  Comments:  Continue cefdinir 14days  Orders:  -     AMB POC URINALYSIS DIP STICK AUTO W/O MICRO  -     AMB POC PVR, JULES,POST-VOID RES,US,NON-IMAGING  3. Incomplete emptying of bladder  Assessment & Plan:  Incomplete emptying.  Residuals lower on bethanechol at 258 (from 450) but still a concern with UTI.  Discussed CIC vs javier.  She felt more comfortable with an indwelling javier.  DIL said she could learn self cath if needed.      Plan javier 3-4 days and then d/c.      Orders:  -     AMB POC URINALYSIS DIP STICK AUTO W/O MICRO  -     AMB POC PVR, JULES,POST-VOID RES,US,NON-IMAGING      Return in about 1 month (around 3/9/2024).   Armani Groves MD       Please note that portions of this note was potentially completed with Dragon dictation, the computer voice recognition software.  Quite often unanticipated grammatical, syntax, homophones, and other interpretive errors are inadvertently transcribed by the computer software.  Please disregard these errors.  Please excuse any errors that have escaped final proofreading.  Thank you.

## 2024-02-09 NOTE — ASSESSMENT & PLAN NOTE
Overflow rather than urge incontinence.  She is surprised how much urine she makes overnight.  She voids at least two 600cc leg bags.  Bethanechol may help with emptying.

## 2024-02-10 LAB
BACTERIA SPEC CULT: ABNORMAL
CC UR VC: ABNORMAL
SERVICE CMNT-IMP: ABNORMAL

## 2024-02-12 ENCOUNTER — HOSPITAL ENCOUNTER (INPATIENT)
Facility: HOSPITAL | Age: 89
LOS: 4 days | Discharge: HOME | End: 2024-02-16
Attending: EMERGENCY MEDICINE | Admitting: HOSPITALIST
Payer: MEDICARE

## 2024-02-12 ENCOUNTER — APPOINTMENT (OUTPATIENT)
Facility: HOSPITAL | Age: 89
End: 2024-02-12
Payer: MEDICARE

## 2024-02-12 DIAGNOSIS — R53.83 OTHER FATIGUE: ICD-10-CM

## 2024-02-12 DIAGNOSIS — F41.9 ANXIETY: ICD-10-CM

## 2024-02-12 DIAGNOSIS — R06.00 DYSPNEA, UNSPECIFIED TYPE: ICD-10-CM

## 2024-02-12 DIAGNOSIS — R09.02 HYPOXIA: Primary | ICD-10-CM

## 2024-02-12 PROBLEM — J96.01 ACUTE HYPOXEMIC RESPIRATORY FAILURE (HCC): Status: ACTIVE | Noted: 2024-02-12

## 2024-02-12 LAB
ALBUMIN SERPL-MCNC: 4.4 G/DL (ref 3.5–5.2)
ALBUMIN/GLOB SERPL: 1.8 (ref 1.1–2.2)
ALP SERPL-CCNC: 84 U/L (ref 35–104)
ALT SERPL-CCNC: 14 U/L (ref 10–35)
ANION GAP SERPL CALC-SCNC: 10 MMOL/L (ref 5–15)
AST SERPL-CCNC: 28 U/L (ref 10–35)
BACTERIA UR CULT: NO GROWTH
BASOPHILS # BLD: 0.1 K/UL (ref 0–1)
BASOPHILS NFR BLD: 1 % (ref 0–1)
BILIRUB SERPL-MCNC: 0.4 MG/DL (ref 0.2–1)
BUN SERPL-MCNC: 12 MG/DL (ref 8–23)
BUN/CREAT SERPL: 21 (ref 12–20)
CALCIUM SERPL-MCNC: 9.4 MG/DL (ref 8.2–9.6)
CHLORIDE SERPL-SCNC: 96 MMOL/L (ref 98–107)
CO2 SERPL-SCNC: 24 MMOL/L (ref 22–29)
COMMENT:: NORMAL
CREAT SERPL-MCNC: 0.58 MG/DL (ref 0.5–0.9)
DIFFERENTIAL METHOD BLD: ABNORMAL
EKG DIAGNOSIS: NORMAL
EKG Q-T INTERVAL: 312 MS
EKG QRS DURATION: 66 MS
EKG QTC CALCULATION (BAZETT): 398 MS
EKG R AXIS: -60 DEGREES
EKG T AXIS: 50 DEGREES
EKG VENTRICULAR RATE: 98 BPM
EOSINOPHIL # BLD: 0.5 K/UL (ref 0–0.4)
EOSINOPHIL NFR BLD: 6 %
ERYTHROCYTE [DISTWIDTH] IN BLOOD BY AUTOMATED COUNT: 13.2 % (ref 11.5–14.5)
FLUAV RNA SPEC QL NAA+PROBE: NOT DETECTED
FLUBV RNA SPEC QL NAA+PROBE: NOT DETECTED
GLOBULIN SER CALC-MCNC: 2.5 G/DL (ref 2–4)
GLUCOSE SERPL-MCNC: 103 MG/DL (ref 65–100)
HCT VFR BLD AUTO: 41.3 % (ref 35–47)
HGB BLD-MCNC: 14 G/DL (ref 11.5–16)
IMM GRANULOCYTES # BLD AUTO: 0 K/UL (ref 0–0.04)
IMM GRANULOCYTES NFR BLD AUTO: 0 % (ref 0–0.5)
LACTATE BLD-SCNC: 0.91 MMOL/L (ref 0.4–2)
LYMPHOCYTES # BLD: 1.8 K/UL (ref 0.8–3.5)
LYMPHOCYTES NFR BLD: 21 % (ref 12–49)
MCH RBC QN AUTO: 31 PG (ref 26–34)
MCHC RBC AUTO-ENTMCNC: 33.9 G/DL (ref 30–36.5)
MCV RBC AUTO: 91.4 FL (ref 80–99)
MONOCYTES # BLD: 0.7 K/UL (ref 0–1)
MONOCYTES NFR BLD: 8 % (ref 5–13)
NEUTS SEG # BLD: 5.4 K/UL (ref 1.8–8)
NEUTS SEG NFR BLD: 64 % (ref 32–75)
NRBC # BLD: 0 K/UL (ref 0–0.01)
NRBC BLD-RTO: 0 PER 100 WBC
NT PRO BNP: 1061 PG/ML
PLATELET # BLD AUTO: 241 K/UL (ref 150–400)
PMV BLD AUTO: 10 FL (ref 8.9–12.9)
POTASSIUM SERPL-SCNC: 4.4 MMOL/L (ref 3.5–5.1)
PROT SERPL-MCNC: 6.9 G/DL (ref 6.4–8.3)
RBC # BLD AUTO: 4.52 M/UL (ref 3.8–5.2)
SARS-COV-2 RNA RESP QL NAA+PROBE: NOT DETECTED
SODIUM SERPL-SCNC: 130 MMOL/L (ref 136–145)
SPECIMEN HOLD: NORMAL
TROPONIN I BLD-MCNC: <0.04 NG/ML (ref 0–0.08)
WBC # BLD AUTO: 8.4 K/UL (ref 3.6–11)

## 2024-02-12 PROCEDURE — 6360000002 HC RX W HCPCS: Performed by: HOSPITALIST

## 2024-02-12 PROCEDURE — 71045 X-RAY EXAM CHEST 1 VIEW: CPT

## 2024-02-12 PROCEDURE — 93005 ELECTROCARDIOGRAM TRACING: CPT | Performed by: EMERGENCY MEDICINE

## 2024-02-12 PROCEDURE — 6360000002 HC RX W HCPCS: Performed by: EMERGENCY MEDICINE

## 2024-02-12 PROCEDURE — 94640 AIRWAY INHALATION TREATMENT: CPT

## 2024-02-12 PROCEDURE — 6370000000 HC RX 637 (ALT 250 FOR IP): Performed by: HOSPITALIST

## 2024-02-12 PROCEDURE — 99285 EMERGENCY DEPT VISIT HI MDM: CPT

## 2024-02-12 PROCEDURE — 36415 COLL VENOUS BLD VENIPUNCTURE: CPT

## 2024-02-12 PROCEDURE — 85025 COMPLETE CBC W/AUTO DIFF WBC: CPT

## 2024-02-12 PROCEDURE — 2580000003 HC RX 258: Performed by: HOSPITALIST

## 2024-02-12 PROCEDURE — 80053 COMPREHEN METABOLIC PANEL: CPT

## 2024-02-12 PROCEDURE — 84484 ASSAY OF TROPONIN QUANT: CPT

## 2024-02-12 PROCEDURE — 87636 SARSCOV2 & INF A&B AMP PRB: CPT

## 2024-02-12 PROCEDURE — 93010 ELECTROCARDIOGRAM REPORT: CPT | Performed by: INTERNAL MEDICINE

## 2024-02-12 PROCEDURE — 2000000000 HC ICU R&B

## 2024-02-12 PROCEDURE — 94761 N-INVAS EAR/PLS OXIMETRY MLT: CPT

## 2024-02-12 PROCEDURE — 6370000000 HC RX 637 (ALT 250 FOR IP): Performed by: EMERGENCY MEDICINE

## 2024-02-12 PROCEDURE — 83605 ASSAY OF LACTIC ACID: CPT

## 2024-02-12 PROCEDURE — 83880 ASSAY OF NATRIURETIC PEPTIDE: CPT

## 2024-02-12 RX ORDER — POTASSIUM CHLORIDE 750 MG/1
40 TABLET, FILM COATED, EXTENDED RELEASE ORAL PRN
Status: DISCONTINUED | OUTPATIENT
Start: 2024-02-12 | End: 2024-02-16 | Stop reason: HOSPADM

## 2024-02-12 RX ORDER — IPRATROPIUM BROMIDE AND ALBUTEROL SULFATE 2.5; .5 MG/3ML; MG/3ML
1 SOLUTION RESPIRATORY (INHALATION)
Status: DISCONTINUED | OUTPATIENT
Start: 2024-02-12 | End: 2024-02-14

## 2024-02-12 RX ORDER — SODIUM CHLORIDE 0.9 % (FLUSH) 0.9 %
5-40 SYRINGE (ML) INJECTION EVERY 12 HOURS SCHEDULED
Status: DISCONTINUED | OUTPATIENT
Start: 2024-02-12 | End: 2024-02-16 | Stop reason: HOSPADM

## 2024-02-12 RX ORDER — POTASSIUM CHLORIDE 7.45 MG/ML
10 INJECTION INTRAVENOUS PRN
Status: DISCONTINUED | OUTPATIENT
Start: 2024-02-12 | End: 2024-02-16 | Stop reason: HOSPADM

## 2024-02-12 RX ORDER — ATORVASTATIN CALCIUM 20 MG/1
20 TABLET, FILM COATED ORAL NIGHTLY
Status: DISCONTINUED | OUTPATIENT
Start: 2024-02-12 | End: 2024-02-16 | Stop reason: HOSPADM

## 2024-02-12 RX ORDER — ONDANSETRON 4 MG/1
4 TABLET, ORALLY DISINTEGRATING ORAL EVERY 8 HOURS PRN
Status: DISCONTINUED | OUTPATIENT
Start: 2024-02-12 | End: 2024-02-16 | Stop reason: HOSPADM

## 2024-02-12 RX ORDER — POLYETHYLENE GLYCOL 3350 17 G/17G
17 POWDER, FOR SOLUTION ORAL DAILY PRN
Status: DISCONTINUED | OUTPATIENT
Start: 2024-02-12 | End: 2024-02-16 | Stop reason: HOSPADM

## 2024-02-12 RX ORDER — ACETAMINOPHEN 650 MG/1
650 SUPPOSITORY RECTAL EVERY 6 HOURS PRN
Status: DISCONTINUED | OUTPATIENT
Start: 2024-02-12 | End: 2024-02-16 | Stop reason: HOSPADM

## 2024-02-12 RX ORDER — ONDANSETRON 2 MG/ML
4 INJECTION INTRAMUSCULAR; INTRAVENOUS EVERY 6 HOURS PRN
Status: DISCONTINUED | OUTPATIENT
Start: 2024-02-12 | End: 2024-02-16 | Stop reason: HOSPADM

## 2024-02-12 RX ORDER — IPRATROPIUM BROMIDE AND ALBUTEROL SULFATE 2.5; .5 MG/3ML; MG/3ML
1 SOLUTION RESPIRATORY (INHALATION)
Status: COMPLETED | OUTPATIENT
Start: 2024-02-12 | End: 2024-02-12

## 2024-02-12 RX ORDER — ALPRAZOLAM 0.5 MG/1
0.5 TABLET ORAL 2 TIMES DAILY
Status: DISCONTINUED | OUTPATIENT
Start: 2024-02-12 | End: 2024-02-12 | Stop reason: SDUPTHER

## 2024-02-12 RX ORDER — SODIUM CHLORIDE 9 MG/ML
INJECTION, SOLUTION INTRAVENOUS PRN
Status: DISCONTINUED | OUTPATIENT
Start: 2024-02-12 | End: 2024-02-16 | Stop reason: HOSPADM

## 2024-02-12 RX ORDER — FUROSEMIDE 10 MG/ML
40 INJECTION INTRAMUSCULAR; INTRAVENOUS ONCE
Status: COMPLETED | OUTPATIENT
Start: 2024-02-12 | End: 2024-02-12

## 2024-02-12 RX ORDER — ALPRAZOLAM 0.5 MG/1
0.5 TABLET ORAL 2 TIMES DAILY
Status: DISCONTINUED | OUTPATIENT
Start: 2024-02-12 | End: 2024-02-16 | Stop reason: HOSPADM

## 2024-02-12 RX ORDER — SODIUM CHLORIDE 0.9 % (FLUSH) 0.9 %
5-40 SYRINGE (ML) INJECTION PRN
Status: DISCONTINUED | OUTPATIENT
Start: 2024-02-12 | End: 2024-02-16 | Stop reason: HOSPADM

## 2024-02-12 RX ORDER — MAGNESIUM SULFATE IN WATER 40 MG/ML
2000 INJECTION, SOLUTION INTRAVENOUS PRN
Status: DISCONTINUED | OUTPATIENT
Start: 2024-02-12 | End: 2024-02-16 | Stop reason: HOSPADM

## 2024-02-12 RX ORDER — ACETAMINOPHEN 325 MG/1
650 TABLET ORAL EVERY 6 HOURS PRN
Status: DISCONTINUED | OUTPATIENT
Start: 2024-02-12 | End: 2024-02-16 | Stop reason: HOSPADM

## 2024-02-12 RX ORDER — LANOLIN ALCOHOL/MO/W.PET/CERES
3 CREAM (GRAM) TOPICAL NIGHTLY PRN
Status: DISCONTINUED | OUTPATIENT
Start: 2024-02-12 | End: 2024-02-16 | Stop reason: HOSPADM

## 2024-02-12 RX ORDER — LEVOTHYROXINE SODIUM 88 UG/1
88 TABLET ORAL EVERY MORNING
Status: DISCONTINUED | OUTPATIENT
Start: 2024-02-13 | End: 2024-02-16 | Stop reason: HOSPADM

## 2024-02-12 RX ORDER — METOPROLOL SUCCINATE 50 MG/1
50 TABLET, EXTENDED RELEASE ORAL DAILY
Status: DISCONTINUED | OUTPATIENT
Start: 2024-02-12 | End: 2024-02-16 | Stop reason: HOSPADM

## 2024-02-12 RX ORDER — LISINOPRIL 20 MG/1
40 TABLET ORAL DAILY
Status: DISCONTINUED | OUTPATIENT
Start: 2024-02-12 | End: 2024-02-16 | Stop reason: HOSPADM

## 2024-02-12 RX ADMIN — SODIUM CHLORIDE 2.5 MG/HR: 9 INJECTION, SOLUTION INTRAVENOUS at 18:15

## 2024-02-12 RX ADMIN — ALPRAZOLAM 0.5 MG: 0.5 TABLET ORAL at 21:34

## 2024-02-12 RX ADMIN — IPRATROPIUM BROMIDE AND ALBUTEROL SULFATE 1 DOSE: .5; 3 SOLUTION RESPIRATORY (INHALATION) at 11:31

## 2024-02-12 RX ADMIN — METHYLPREDNISOLONE SODIUM SUCCINATE 60 MG: 125 INJECTION, POWDER, LYOPHILIZED, FOR SOLUTION INTRAMUSCULAR; INTRAVENOUS at 21:35

## 2024-02-12 RX ADMIN — POLYETHYLENE GLYCOL 3350 17 G: 17 POWDER, FOR SOLUTION ORAL at 21:36

## 2024-02-12 RX ADMIN — WATER 1000 MG: 1 INJECTION INTRAMUSCULAR; INTRAVENOUS; SUBCUTANEOUS at 17:08

## 2024-02-12 RX ADMIN — IPRATROPIUM BROMIDE AND ALBUTEROL SULFATE 1 DOSE: .5; 3 SOLUTION RESPIRATORY (INHALATION) at 22:28

## 2024-02-12 RX ADMIN — ALPRAZOLAM 0.5 MG: 0.5 TABLET ORAL at 17:42

## 2024-02-12 RX ADMIN — METOPROLOL SUCCINATE 50 MG: 50 TABLET, EXTENDED RELEASE ORAL at 16:57

## 2024-02-12 RX ADMIN — METHYLPREDNISOLONE SODIUM SUCCINATE 125 MG: 125 INJECTION, POWDER, FOR SOLUTION INTRAMUSCULAR; INTRAVENOUS at 13:03

## 2024-02-12 RX ADMIN — ATORVASTATIN CALCIUM 20 MG: 20 TABLET, FILM COATED ORAL at 21:34

## 2024-02-12 RX ADMIN — FUROSEMIDE 40 MG: 10 INJECTION, SOLUTION INTRAMUSCULAR; INTRAVENOUS at 17:08

## 2024-02-12 RX ADMIN — LISINOPRIL 40 MG: 20 TABLET ORAL at 16:57

## 2024-02-12 RX ADMIN — SODIUM CHLORIDE, PRESERVATIVE FREE 10 ML: 5 INJECTION INTRAVENOUS at 21:35

## 2024-02-12 ASSESSMENT — PAIN - FUNCTIONAL ASSESSMENT: PAIN_FUNCTIONAL_ASSESSMENT: NONE - DENIES PAIN

## 2024-02-12 NOTE — ED NOTES
Daughter-in-law stepping away from bedside to get patient belongings from private residence. Daughter-in-law and patient updated on ETA of transport for admission to [facility].

## 2024-02-12 NOTE — ED PROVIDER NOTES
[facility] EMERGENCY DEPT  EMERGENCY DEPARTMENT ENCOUNTER      Pt Name: Zoey Sened  MRN: [id number]  Birthdate 10/8/1930  Date of evaluation: 2/12/2024  Provider: Jamshid Andrade DO    CHIEF COMPLAINT       Chief Complaint   Patient presents with    Shortness of Breath    Fatigue         HISTORY OF PRESENT ILLNESS   (Location/Symptom, Timing/Onset, Context/Setting, Quality, Duration, Modifying Factors, Severity)  Note limiting factors.   89-year-old presents with shortness of breath.  Patient apparently was seen here at some point last week and diagnosed with a urinary tract infection.  She began having shortness of breath thereafter.  Patient presents here hypoxic complaining of worsening dyspnea over the last several days.  She denies chest pain fevers chills.  No abdominal pain nausea or vomiting.  She otherwise denies other complaints            Review of External Medical Records:     Nursing Notes were reviewed.    REVIEW OF SYSTEMS    (2-9 systems for level 4, 10 or more for level 5)     Review of Systems    Except as noted above the remainder of the review of systems was reviewed and negative.       PAST MEDICAL HISTORY     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    Atrial fibrillation (HCC)     Atrophy of vulva 4/22/2009    Generalized anxiety disorder     Hx of bone density study 2/24/14    Normal    Hypertension     Postmenopausal atrophic vaginitis 4/22/2009    Symptomatic menopausal or female climacteric states     Unspecified asthma(493.90)     Unspecified hypothyroidism          SURGICAL HISTORY       Past Surgical History:   Procedure Laterality Date    HYSTERECTOMY, TOTAL ABDOMINAL (CERVIX REMOVED)  1970    W/ USO    OTHER SURGICAL HISTORY      Anterior Posterior Repair    THYROIDECTOMY, PARTIAL      TONSILLECTOMY      UROLOGICAL SURGERY      TVT         CURRENT MEDICATIONS       Previous Medications    ALPRAZOLAM (XANAX) 0.5 MG TABLET    Take 1

## 2024-02-12 NOTE — ED TRIAGE NOTES
Pt arrives to ER accompanied by daughter-in-law with cc of SOB and fatigue. Per family member, patient recently seen here last Thursday for bladder infection and urinary retention.  Indwelling javier catheter placed by Rian PISANO Urologist. [facility]. Hx of COPD and afib with recent start of Eliquis. Pt denies use of supplemental O2 at home, but reports neb tx PTA. Pt 85% RA.     Pt immediately placed on supplemental O2 via NC. Raymond PISANO called to bedside.    11:15 AM  O2 weaned to 2L NC. Pt almost immediately able to speak in complete sentences. Pt encouraged to take deep breaths.     Pt ambulatory at baseline with PRN walker use. Pt resides in Independent Living at [facility].

## 2024-02-12 NOTE — ED NOTES
SBAR given to Magno Teixeira, [facility]. [facility] provided with all necessary documentation. [facility] loading patient onto stretcher at this time. Patient sent with black purse and 3 patient belonging bags.

## 2024-02-12 NOTE — ED NOTES
Pt. Received from [facility]. Pt. Is on 2 liters with transport.    The patient is a 32y Male complaining of lacerations.

## 2024-02-12 NOTE — H&P
Hospitalist Admission Note      NAME:  Zoey Sneed   :  10/8/1930   MRN:  [id number]     Date/Time:  2024 1:03 PM    Patient PCP: Jessie Lord MD    ________________________________________________________________________    Given the patient's current clinical presentation, I have a high level of concern for decompensation if discharged from the emergency department.  Complex decision making was performed, which includes reviewing the patient's available past medical records, laboratory results, and x-ray films.       My assessment of this patient's clinical condition and my plan of care is as follows.    Assessment / Plan:  Patient is a 89-year old female comes to the hospital with chief complaint of shortness of breath and acute hypoxemic respiratory failure. Patient was recently diagnosed with UTI and urinary retention.  Patient has indwelling King catheter.  Chest x-ray is clear.  Her blood pressure is uncontrolled and she is still in A-fib.  She had a CTA chest done on 2024 which is negative for PE.    1.  Acute hypoxemic respiratory failure  Probably related to uncontrolled hypertension, A-fib and COPD.  Chest x-ray is clear.  Patient had a CTA chest on 2024 which was negative for PE.  Patient did have multiple pulmonary nodules.  Patient is on Eliquis.  Start IV steroids, breathing treatments, IV antibiotics.    2.  Atrial fibrillation  Patient is on Eliquis at home.  Patient is also on Toprol-XL 50 mg daily.    3.  Hyperlipidemia  Patient takes Lipitor 20 mg nightly.    4.  Hypothyroidism  On Synthroid 88 mcg daily.    5.  Hypertensive urgency  Patient takes lisinopril and metoprolol at home  Start IV Cardene.    6.  Anxiety  On Xanax at home.    7.   Recent UTI  Urine culture was growing Proteus.  Patient will be given IV Rocephin.  May need a CT abd/pel if no urine output      I have personally reviewed the radiographs, laboratory data in Epic and decisions and statements above are

## 2024-02-13 LAB
ANION GAP SERPL CALC-SCNC: 8 MMOL/L (ref 5–15)
B PERT DNA SPEC QL NAA+PROBE: NOT DETECTED
BASOPHILS # BLD: 0 K/UL (ref 0–0.1)
BASOPHILS NFR BLD: 0 % (ref 0–1)
BORDETELLA PARAPERTUSSIS BY PCR: NOT DETECTED
BUN SERPL-MCNC: 19 MG/DL (ref 6–20)
BUN/CREAT SERPL: 24 (ref 12–20)
C PNEUM DNA SPEC QL NAA+PROBE: NOT DETECTED
CALCIUM SERPL-MCNC: 8.7 MG/DL (ref 8.5–10.1)
CHLORIDE SERPL-SCNC: 98 MMOL/L (ref 97–108)
CO2 SERPL-SCNC: 24 MMOL/L (ref 21–32)
CREAT SERPL-MCNC: 0.78 MG/DL (ref 0.55–1.02)
DIFFERENTIAL METHOD BLD: ABNORMAL
EOSINOPHIL # BLD: 0 K/UL (ref 0–0.4)
EOSINOPHIL NFR BLD: 0 % (ref 0–7)
ERYTHROCYTE [DISTWIDTH] IN BLOOD BY AUTOMATED COUNT: 13.1 % (ref 11.5–14.5)
FLUAV SUBTYP SPEC NAA+PROBE: NOT DETECTED
FLUBV RNA SPEC QL NAA+PROBE: NOT DETECTED
GLUCOSE SERPL-MCNC: 151 MG/DL (ref 65–100)
HADV DNA SPEC QL NAA+PROBE: NOT DETECTED
HCOV 229E RNA SPEC QL NAA+PROBE: NOT DETECTED
HCOV HKU1 RNA SPEC QL NAA+PROBE: NOT DETECTED
HCOV NL63 RNA SPEC QL NAA+PROBE: NOT DETECTED
HCOV OC43 RNA SPEC QL NAA+PROBE: NOT DETECTED
HCT VFR BLD AUTO: 38.5 % (ref 35–47)
HGB BLD-MCNC: 13.8 G/DL (ref 11.5–16)
HMPV RNA SPEC QL NAA+PROBE: NOT DETECTED
HPIV1 RNA SPEC QL NAA+PROBE: NOT DETECTED
HPIV2 RNA SPEC QL NAA+PROBE: NOT DETECTED
HPIV3 RNA SPEC QL NAA+PROBE: NOT DETECTED
HPIV4 RNA SPEC QL NAA+PROBE: NOT DETECTED
IMM GRANULOCYTES # BLD AUTO: 0.1 K/UL (ref 0–0.04)
IMM GRANULOCYTES NFR BLD AUTO: 1 % (ref 0–0.5)
LYMPHOCYTES # BLD: 1 K/UL (ref 0.8–3.5)
LYMPHOCYTES NFR BLD: 15 % (ref 12–49)
M PNEUMO DNA SPEC QL NAA+PROBE: NOT DETECTED
MCH RBC QN AUTO: 31.9 PG (ref 26–34)
MCHC RBC AUTO-ENTMCNC: 35.8 G/DL (ref 30–36.5)
MCV RBC AUTO: 89.1 FL (ref 80–99)
MONOCYTES # BLD: 0.1 K/UL (ref 0–1)
MONOCYTES NFR BLD: 2 % (ref 5–13)
NEUTS SEG # BLD: 5.6 K/UL (ref 1.8–8)
NEUTS SEG NFR BLD: 82 % (ref 32–75)
NRBC # BLD: 0 K/UL (ref 0–0.01)
NRBC BLD-RTO: 0 PER 100 WBC
PLATELET # BLD AUTO: 250 K/UL (ref 150–400)
PMV BLD AUTO: 10.1 FL (ref 8.9–12.9)
POTASSIUM SERPL-SCNC: 3.7 MMOL/L (ref 3.5–5.1)
RBC # BLD AUTO: 4.32 M/UL (ref 3.8–5.2)
RSV RNA SPEC QL NAA+PROBE: NOT DETECTED
RV+EV RNA SPEC QL NAA+PROBE: NOT DETECTED
SARS-COV-2 RNA RESP QL NAA+PROBE: NOT DETECTED
SODIUM SERPL-SCNC: 130 MMOL/L (ref 136–145)
WBC # BLD AUTO: 6.7 K/UL (ref 3.6–11)

## 2024-02-13 PROCEDURE — 87070 CULTURE OTHR SPECIMN AEROBIC: CPT

## 2024-02-13 PROCEDURE — 6370000000 HC RX 637 (ALT 250 FOR IP): Performed by: STUDENT IN AN ORGANIZED HEALTH CARE EDUCATION/TRAINING PROGRAM

## 2024-02-13 PROCEDURE — 94640 AIRWAY INHALATION TREATMENT: CPT

## 2024-02-13 PROCEDURE — 6370000000 HC RX 637 (ALT 250 FOR IP): Performed by: INTERNAL MEDICINE

## 2024-02-13 PROCEDURE — 6370000000 HC RX 637 (ALT 250 FOR IP): Performed by: HOSPITALIST

## 2024-02-13 PROCEDURE — 36415 COLL VENOUS BLD VENIPUNCTURE: CPT

## 2024-02-13 PROCEDURE — 94761 N-INVAS EAR/PLS OXIMETRY MLT: CPT

## 2024-02-13 PROCEDURE — 2580000003 HC RX 258: Performed by: HOSPITALIST

## 2024-02-13 PROCEDURE — 6360000002 HC RX W HCPCS: Performed by: INTERNAL MEDICINE

## 2024-02-13 PROCEDURE — 87205 SMEAR GRAM STAIN: CPT

## 2024-02-13 PROCEDURE — 6360000002 HC RX W HCPCS: Performed by: HOSPITALIST

## 2024-02-13 PROCEDURE — 2700000000 HC OXYGEN THERAPY PER DAY

## 2024-02-13 PROCEDURE — 1100000000 HC RM PRIVATE

## 2024-02-13 PROCEDURE — 2580000003 HC RX 258: Performed by: INTERNAL MEDICINE

## 2024-02-13 PROCEDURE — 80048 BASIC METABOLIC PNL TOTAL CA: CPT

## 2024-02-13 PROCEDURE — 51798 US URINE CAPACITY MEASURE: CPT

## 2024-02-13 PROCEDURE — 85025 COMPLETE CBC W/AUTO DIFF WBC: CPT

## 2024-02-13 PROCEDURE — 0202U NFCT DS 22 TRGT SARS-COV-2: CPT

## 2024-02-13 RX ORDER — GUAIFENESIN/DEXTROMETHORPHAN 100-10MG/5
5 SYRUP ORAL EVERY 4 HOURS
Status: DISCONTINUED | OUTPATIENT
Start: 2024-02-13 | End: 2024-02-13

## 2024-02-13 RX ORDER — GUAIFENESIN/DEXTROMETHORPHAN 100-10MG/5
5 SYRUP ORAL
Status: DISCONTINUED | OUTPATIENT
Start: 2024-02-13 | End: 2024-02-16 | Stop reason: HOSPADM

## 2024-02-13 RX ORDER — BUDESONIDE 0.5 MG/2ML
0.5 INHALANT ORAL
Status: DISCONTINUED | OUTPATIENT
Start: 2024-02-13 | End: 2024-02-16 | Stop reason: HOSPADM

## 2024-02-13 RX ADMIN — BUDESONIDE 500 MCG: 0.5 INHALANT RESPIRATORY (INHALATION) at 23:10

## 2024-02-13 RX ADMIN — GUAIFENESIN AND DEXTROMETHORPHAN 5 ML: 100; 10 SYRUP ORAL at 21:31

## 2024-02-13 RX ADMIN — WATER 1000 MG: 1 INJECTION INTRAMUSCULAR; INTRAVENOUS; SUBCUTANEOUS at 16:15

## 2024-02-13 RX ADMIN — METHYLPREDNISOLONE SODIUM SUCCINATE 60 MG: 125 INJECTION, POWDER, LYOPHILIZED, FOR SOLUTION INTRAMUSCULAR; INTRAVENOUS at 04:39

## 2024-02-13 RX ADMIN — GUAIFENESIN AND DEXTROMETHORPHAN 5 ML: 100; 10 SYRUP ORAL at 17:44

## 2024-02-13 RX ADMIN — IPRATROPIUM BROMIDE AND ALBUTEROL SULFATE 1 DOSE: .5; 3 SOLUTION RESPIRATORY (INHALATION) at 08:14

## 2024-02-13 RX ADMIN — IPRATROPIUM BROMIDE AND ALBUTEROL SULFATE 1 DOSE: .5; 3 SOLUTION RESPIRATORY (INHALATION) at 15:41

## 2024-02-13 RX ADMIN — LEVOTHYROXINE SODIUM 88 MCG: 0.09 TABLET ORAL at 08:38

## 2024-02-13 RX ADMIN — WATER 40 MG: 1 INJECTION INTRAMUSCULAR; INTRAVENOUS; SUBCUTANEOUS at 17:44

## 2024-02-13 RX ADMIN — ATORVASTATIN CALCIUM 20 MG: 20 TABLET, FILM COATED ORAL at 21:30

## 2024-02-13 RX ADMIN — SODIUM CHLORIDE, PRESERVATIVE FREE 10 ML: 5 INJECTION INTRAVENOUS at 21:31

## 2024-02-13 RX ADMIN — IPRATROPIUM BROMIDE AND ALBUTEROL SULFATE 1 DOSE: .5; 3 SOLUTION RESPIRATORY (INHALATION) at 23:04

## 2024-02-13 RX ADMIN — APIXABAN 2.5 MG: 2.5 TABLET, FILM COATED ORAL at 15:12

## 2024-02-13 RX ADMIN — METOPROLOL SUCCINATE 50 MG: 50 TABLET, EXTENDED RELEASE ORAL at 10:20

## 2024-02-13 RX ADMIN — LISINOPRIL 40 MG: 20 TABLET ORAL at 09:23

## 2024-02-13 RX ADMIN — GUAIFENESIN AND DEXTROMETHORPHAN 5 ML: 100; 10 SYRUP ORAL at 13:26

## 2024-02-13 RX ADMIN — ACETAMINOPHEN 650 MG: 325 TABLET ORAL at 13:12

## 2024-02-13 RX ADMIN — ALPRAZOLAM 0.5 MG: 0.5 TABLET ORAL at 21:30

## 2024-02-13 RX ADMIN — SODIUM CHLORIDE, PRESERVATIVE FREE 10 ML: 5 INJECTION INTRAVENOUS at 08:38

## 2024-02-13 ASSESSMENT — PAIN DESCRIPTION - DESCRIPTORS: DESCRIPTORS: ACHING

## 2024-02-13 ASSESSMENT — PAIN DESCRIPTION - LOCATION: LOCATION: HEAD

## 2024-02-13 ASSESSMENT — PAIN SCALES - GENERAL
PAINLEVEL_OUTOF10: 3
PAINLEVEL_OUTOF10: 0

## 2024-02-13 ASSESSMENT — PAIN DESCRIPTION - ORIENTATION: ORIENTATION: ANTERIOR

## 2024-02-13 ASSESSMENT — PULMONARY FUNCTION TESTS: PEFR_L/MIN: 94

## 2024-02-13 ASSESSMENT — PAIN - FUNCTIONAL ASSESSMENT: PAIN_FUNCTIONAL_ASSESSMENT: ACTIVITIES ARE NOT PREVENTED

## 2024-02-13 NOTE — CARE COORDINATION
Case management note-    Patient with RUR 12%, no CM assessment indicated at this time.    Patient admitted with acute hypoxia/resp failure, COPD exac currently on 2L NC, does not use home oxygen at baseline.  Recent UTI with urinary retention has indwelling King catheter, is followed by Dr. Rico PISANO, urology.  Will continue to follow for possible discharge needs.    Carolina Reese RN, [facility]  Care management

## 2024-02-13 NOTE — CONSULTS
Name: Zoey Sneed MRN: [id number]   : 10/8/1930 Hospital: [facility]   Date: 2024        Impression Plan   Acute respiratory failure with hypoxia  COPD with exacerbation  No pulmonary nodules  Recent UTI with protues  Urinary retention with indwelling javier  HTN urgency               Goal satsa 90% or higher  Exercise oximetry closer to discharge  Decrease IV steroids, can likely go to PO tomorrow  On ceftriaxone for a possible UTI, does not need abx from a pulmonary standpoint  Will need outpatient follow-up of nodules  Duonebs, add pulmicort; on symbicort at home  Check a RVP  Off of cardene    Ok to transfer out of the ICU       Radiology  ( personally reviewed) CXR 2024: no acute process    CTA 2024: no LAD, +coronary artery calcifications, multilobulated nodule in the lingula up to 1.3cm, multiple scattered nodules less than 5mm in size   ABG Invalid input(s): \"PHI\", \"PO2I\", \"PCO2I\"       Subjective     Patient is a 89 y.o. female with a history of ?COPD (no PFTs for review), afib (on Eliquis), HTN, and recent UTI (proteus) with urinary retention requiring javier catheter placement who is admitted to the hospital with acute respiratory failure with hypoxia.  She presented with several days of shortness of breath on exertion and was found to be hypoxic to 85% on RA. Has some nasal congestion, but no cough or wheezing. CXR without acute changes, CTA on the  with multiple pulmonary nodules.     She currently denies shortness of breath. She is satting mid to high 90's on 2L.     Review of Systems:  Pertinent items are noted in HPI.    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    Atrial fibrillation (HCC)     Atrophy of vulva 2009    Generalized anxiety disorder     Hx of bone density study 14    Normal    Hypertension     Postmenopausal atrophic vaginitis 2009    Symptomatic menopausal or female climacteric  retractions LUNGS: CTAB, respirations nonlabored , HEART:  Regular rate and rhythm with no MGR; no edema is present, ABDOMEN:  soft with no tenderness, bowel sounds present, EXTREMITIES:  warm with no cyanosis, SKIN:  no jaundice or ecchymosis, and NEUROLOGIC:  alert and oriented, grossly non-focal    Iona Harrell MD

## 2024-02-13 NOTE — PROGRESS NOTES
Spiritual Care Assessment/Progress Note  [facility]    Name: Zoey Sneed MRN: [id number]    Age: 89 y.o.     Sex: female   Language: English     Date: 2024            Total Time Calculated: 56 min              Spiritual Assessment begun in [facility] A4 INTENSIVE CARE UNIT  Service Provided For:: Patient  Referral/Consult From:: Multi-disciplinary team  Encounter Overview/Reason : Spiritual/Emotional Needs    Spiritual beliefs:      [x] Involved in a jeanie tradition/spiritual practice: Protestant     [] Supported by a jeanie community:      [] Claims no spiritual orientation:      [] Seeking spiritual identity:           [] Adheres to an individual form of spirituality:      [] Not able to assess:                Identified resources for coping and support system:   Support System: Family members       [x] Prayer                  [] Devotional reading               [] Music                  [] Guided Imagery     [] Pet visits                                        [] Other: (COMMENT)     Specific area/focus of visit   Encounter:    Crisis:    Spiritual/Emotional needs: Type: Spiritual Support  Ritual, Rites and Sacraments:    Grief, Loss, and Adjustments:    Ethics/Mediation:    Behavioral Health:    Palliative Care:    Advance Care Planning:      Plan/Referrals: Other (Comment) (Please contact [facility] for further consults.)    Narrative:   visit for the patient in ICU with a Spiritual Care consult. Reviewed pt's chart and spoke with pt's nurse. Introduced self and chaplaincy. Pt shared recent medical events. Pt shared her   in November. They were  for 47 years and together for over 50. Pt shared she has recently moved from her home to [facility]. Pt is hopeful she will find community there. Pt has 3 stepsons and 1 daughter, 4 grandchildren and 5 great-grandchildren. Pt yeyo through the support of her family and prayer. Pt offered she no longer attends

## 2024-02-13 NOTE — CONSULTS
New Urology Consult Note    Patient: Zoey Sneed MRN: [id number]  SSN: [id number]    YOB: 1930  Age: 89 y.o.  Sex: female            Plan:     UTI, javier catheter  -Javier catheter placed by [facility] to assist with clearing urinary tract infection, PVR was 258ml when placed. She was supposed to void trial yesterday or today, but given her current admission was unable to make the appointment.  Okay to DC catheter for void trial.  Obtain a PVR after her first void, replace Javier catheter if PVR is greater than 400 mL.  -Culture driven antibiotics    Outpatient follow-up with her primary urologist    Thank you for this consult. Please contact [facility] with any further questions/concerns.    History of Present Illness:     Chief Complaint:  without complaints     Zoey Sneed is seen in consultation for reasons noted above at the request of Mike Cueva MD. Admitted to hospital for Acute hypoxemic respiratory failure (HCC)    Patient is a 89 y.o. female with a history of ?COPD, afib (on Eliquis), HTN, nocturia, incomplete bladder emptying, vaginal atrophy, and grade 1 rectocele, recent UTI (proteus) with urinary retention requiring javier catheter placement who is admitted to the hospital with acute respiratory failure with hypoxia.      Urology consulted today regarding Javier catheter and request for removal per infection control.    Chart reviewed, she is known to  but has followed recently with Dr. Groves with [facility].     She was seen by [facility] several times recently. + Urine culture with Aerococcus.  Incomplete bladder emptying at baseline. Javier placed for PVR 258ml by [facility] to assist with clearing infection. She was supposed to VT yesterday or today.     Subjective     Past Medical History  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    Atrial fibrillation (HCC)     Atrophy of vulva

## 2024-02-13 NOTE — PROGRESS NOTES
Hospitalist Progress Note      NAME:  Zoey Sneed   :  10/8/1930  MRM:  [id number]    Date/Time: 2024  1:32 PM           Assessment / Plan:     89-year old female comes to the hospital with chief complaint of shortness of breath and acute hypoxemic respiratory failure. Patient was recently diagnosed with UTI and urinary retention.  Patient has indwelling Javier catheter.  Chest x-ray is clear.  Her blood pressure is uncontrolled and she is still in A-fib.  She had a CTA chest done on 2024 which is negative for PE.     #Acute hypoxemic respiratory failure, currently on 2 L  #COPD with exacerbation   Chest x-ray is clear.  Patient had a CTA chest on 2024 which was negative for PE.    plan  IV steroids, plan to switch to p.o. tomorrow  DuoNebs/Pulmicor  Added Mucinex  Follow-up on RVP/sputum culture     #Atrial fibrillation  Patient is on Eliquis at home.  Patient is also on Toprol-XL 50 mg daily.    # Hypertensive urgency  Patient takes lisinopril and metoprolol at home  Off Cardene drip  Vitals per protocol     #UTI (proteus) with urinary retention requiring javier catheter placement   -VD today   -Urology consulted     #Hyperlipidemia  Patient takes Lipitor 20 mg nightly.     #  Hypothyroidism  On Synthroid 88 mcg daily.       #Anxiety  On Xanax at home.    I have personally reviewed the radiographs, laboratory data in Epic and decisions and statements above are based partially on this personal interpretation.        40 minutes of CC time spent with the patient other than procedures             Care Plan discussed with: Patient    Discussed:  Care Plan    Prophylaxis:  Lovenox    Disposition:  Home w/Family           ___________________________________________________    Attending Physician: Mike Cueva MD        Subjective:     Chief Complaint: Shortness of breath    ROS:  (bold if positive, if negative)    Tolerating PT  Tolerating Diet          Objective:     Patient is lying comfortable in  mg Oral Nightly    levothyroxine (SYNTHROID) tablet 88 mcg  88 mcg Oral QAM    lisinopril (PRINIVIL;ZESTRIL) tablet 40 mg  40 mg Oral Daily    metoprolol succinate (TOPROL XL) extended release tablet 50 mg  50 mg Oral Daily    sodium chloride flush 0.9 % injection 5-40 mL  5-40 mL IntraVENous 2 times per day    sodium chloride flush 0.9 % injection 5-40 mL  5-40 mL IntraVENous PRN    0.9 % sodium chloride infusion   IntraVENous PRN    potassium chloride (KLOR-CON) extended release tablet 40 mEq  40 mEq Oral PRN    Or    potassium bicarb-citric acid (EFFER-K) effervescent tablet 40 mEq  40 mEq Oral PRN    Or    potassium chloride 10 mEq/100 mL IVPB (Peripheral Line)  10 mEq IntraVENous PRN    magnesium sulfate 2000 mg in 50 mL IVPB premix  2,000 mg IntraVENous PRN    ondansetron (ZOFRAN-ODT) disintegrating tablet 4 mg  4 mg Oral Q8H PRN    Or    ondansetron (ZOFRAN) injection 4 mg  4 mg IntraVENous Q6H PRN    polyethylene glycol (GLYCOLAX) packet 17 g  17 g Oral Daily PRN    acetaminophen (TYLENOL) tablet 650 mg  650 mg Oral Q6H PRN    Or    acetaminophen (TYLENOL) suppository 650 mg  650 mg Rectal Q6H PRN    ipratropium 0.5 mg-albuterol 2.5 mg (DUONEB) nebulizer solution 1 Dose  1 Dose Inhalation Q4H WA RT    cefTRIAXone (ROCEPHIN) 1,000 mg in sterile water 10 mL IV syringe  1,000 mg IntraVENous Q24H    ALPRAZolam (XANAX) tablet 0.5 mg  0.5 mg Oral BID    melatonin tablet 3 mg  3 mg Oral Nightly PRN            Lab Review:     Recent Labs     02/12/24  1125 02/13/24  0445   WBC 8.4 6.7   HGB 14.0 13.8   HCT 41.3 38.5    250     Recent Labs     02/12/24  1125 02/13/24  0445   * 130*   K 4.4 3.7   CL 96* 98   CO2 24 24   BUN 12 19   ALT 14  --      No components found for: \"GLPOC\"

## 2024-02-14 LAB
ANION GAP SERPL CALC-SCNC: 7 MMOL/L (ref 5–15)
ANION GAP SERPL CALC-SCNC: 8 MMOL/L (ref 5–15)
BUN SERPL-MCNC: 23 MG/DL (ref 6–20)
BUN SERPL-MCNC: 25 MG/DL (ref 6–20)
BUN/CREAT SERPL: 26 (ref 12–20)
BUN/CREAT SERPL: 36 (ref 12–20)
CALCIUM SERPL-MCNC: 9.2 MG/DL (ref 8.5–10.1)
CALCIUM SERPL-MCNC: 9.2 MG/DL (ref 8.5–10.1)
CHLORIDE SERPL-SCNC: 93 MMOL/L (ref 97–108)
CHLORIDE SERPL-SCNC: 94 MMOL/L (ref 97–108)
CO2 SERPL-SCNC: 24 MMOL/L (ref 21–32)
CO2 SERPL-SCNC: 25 MMOL/L (ref 21–32)
CREAT SERPL-MCNC: 0.7 MG/DL (ref 0.55–1.02)
CREAT SERPL-MCNC: 0.88 MG/DL (ref 0.55–1.02)
ERYTHROCYTE [DISTWIDTH] IN BLOOD BY AUTOMATED COUNT: 13.1 % (ref 11.5–14.5)
GLUCOSE SERPL-MCNC: 168 MG/DL (ref 65–100)
GLUCOSE SERPL-MCNC: 233 MG/DL (ref 65–100)
HCT VFR BLD AUTO: 36.5 % (ref 35–47)
HGB BLD-MCNC: 13.1 G/DL (ref 11.5–16)
MAGNESIUM SERPL-MCNC: 1.8 MG/DL (ref 1.6–2.4)
MCH RBC QN AUTO: 31.4 PG (ref 26–34)
MCHC RBC AUTO-ENTMCNC: 35.9 G/DL (ref 30–36.5)
MCV RBC AUTO: 87.5 FL (ref 80–99)
NRBC # BLD: 0 K/UL (ref 0–0.01)
NRBC BLD-RTO: 0 PER 100 WBC
OSMOLALITY SERPL: 280 MOSM/KG H2O
OSMOLALITY UR: 236 MOSM/KG H2O
PLATELET # BLD AUTO: 222 K/UL (ref 150–400)
PMV BLD AUTO: 10 FL (ref 8.9–12.9)
POTASSIUM SERPL-SCNC: 3.8 MMOL/L (ref 3.5–5.1)
POTASSIUM SERPL-SCNC: 4 MMOL/L (ref 3.5–5.1)
RBC # BLD AUTO: 4.17 M/UL (ref 3.8–5.2)
SODIUM SERPL-SCNC: 125 MMOL/L (ref 136–145)
SODIUM SERPL-SCNC: 126 MMOL/L (ref 136–145)
SODIUM UR-SCNC: 9 MMOL/L
WBC # BLD AUTO: 15.1 K/UL (ref 3.6–11)

## 2024-02-14 PROCEDURE — 94640 AIRWAY INHALATION TREATMENT: CPT

## 2024-02-14 PROCEDURE — 83935 ASSAY OF URINE OSMOLALITY: CPT

## 2024-02-14 PROCEDURE — 2700000000 HC OXYGEN THERAPY PER DAY

## 2024-02-14 PROCEDURE — 6370000000 HC RX 637 (ALT 250 FOR IP): Performed by: STUDENT IN AN ORGANIZED HEALTH CARE EDUCATION/TRAINING PROGRAM

## 2024-02-14 PROCEDURE — 6370000000 HC RX 637 (ALT 250 FOR IP): Performed by: HOSPITALIST

## 2024-02-14 PROCEDURE — 6360000002 HC RX W HCPCS: Performed by: INTERNAL MEDICINE

## 2024-02-14 PROCEDURE — 84300 ASSAY OF URINE SODIUM: CPT

## 2024-02-14 PROCEDURE — 2580000003 HC RX 258: Performed by: INTERNAL MEDICINE

## 2024-02-14 PROCEDURE — 36415 COLL VENOUS BLD VENIPUNCTURE: CPT

## 2024-02-14 PROCEDURE — 6370000000 HC RX 637 (ALT 250 FOR IP): Performed by: INTERNAL MEDICINE

## 2024-02-14 PROCEDURE — 97165 OT EVAL LOW COMPLEX 30 MIN: CPT

## 2024-02-14 PROCEDURE — 94761 N-INVAS EAR/PLS OXIMETRY MLT: CPT

## 2024-02-14 PROCEDURE — 83735 ASSAY OF MAGNESIUM: CPT

## 2024-02-14 PROCEDURE — 2580000003 HC RX 258: Performed by: HOSPITALIST

## 2024-02-14 PROCEDURE — 85027 COMPLETE CBC AUTOMATED: CPT

## 2024-02-14 PROCEDURE — 1100000000 HC RM PRIVATE

## 2024-02-14 PROCEDURE — 83930 ASSAY OF BLOOD OSMOLALITY: CPT

## 2024-02-14 PROCEDURE — 80048 BASIC METABOLIC PNL TOTAL CA: CPT

## 2024-02-14 PROCEDURE — 6360000002 HC RX W HCPCS: Performed by: HOSPITALIST

## 2024-02-14 RX ORDER — PREDNISONE 20 MG/1
40 TABLET ORAL DAILY
Status: DISCONTINUED | OUTPATIENT
Start: 2024-02-14 | End: 2024-02-16 | Stop reason: HOSPADM

## 2024-02-14 RX ORDER — IPRATROPIUM BROMIDE AND ALBUTEROL SULFATE 2.5; .5 MG/3ML; MG/3ML
1 SOLUTION RESPIRATORY (INHALATION)
Status: DISCONTINUED | OUTPATIENT
Start: 2024-02-14 | End: 2024-02-16 | Stop reason: HOSPADM

## 2024-02-14 RX ADMIN — IPRATROPIUM BROMIDE AND ALBUTEROL SULFATE 1 DOSE: .5; 3 SOLUTION RESPIRATORY (INHALATION) at 14:07

## 2024-02-14 RX ADMIN — IPRATROPIUM BROMIDE AND ALBUTEROL SULFATE 1 DOSE: .5; 3 SOLUTION RESPIRATORY (INHALATION) at 19:35

## 2024-02-14 RX ADMIN — GUAIFENESIN AND DEXTROMETHORPHAN 5 ML: 100; 10 SYRUP ORAL at 13:50

## 2024-02-14 RX ADMIN — WATER 1000 MG: 1 INJECTION INTRAMUSCULAR; INTRAVENOUS; SUBCUTANEOUS at 15:11

## 2024-02-14 RX ADMIN — APIXABAN 2.5 MG: 2.5 TABLET, FILM COATED ORAL at 20:30

## 2024-02-14 RX ADMIN — PREDNISONE 40 MG: 20 TABLET ORAL at 08:54

## 2024-02-14 RX ADMIN — GUAIFENESIN AND DEXTROMETHORPHAN 5 ML: 100; 10 SYRUP ORAL at 17:30

## 2024-02-14 RX ADMIN — GUAIFENESIN AND DEXTROMETHORPHAN 5 ML: 100; 10 SYRUP ORAL at 05:49

## 2024-02-14 RX ADMIN — WATER 40 MG: 1 INJECTION INTRAMUSCULAR; INTRAVENOUS; SUBCUTANEOUS at 05:49

## 2024-02-14 RX ADMIN — BUDESONIDE 500 MCG: 0.5 INHALANT RESPIRATORY (INHALATION) at 07:36

## 2024-02-14 RX ADMIN — SODIUM CHLORIDE, PRESERVATIVE FREE 10 ML: 5 INJECTION INTRAVENOUS at 08:54

## 2024-02-14 RX ADMIN — ATORVASTATIN CALCIUM 20 MG: 20 TABLET, FILM COATED ORAL at 20:30

## 2024-02-14 RX ADMIN — APIXABAN 2.5 MG: 2.5 TABLET, FILM COATED ORAL at 08:54

## 2024-02-14 RX ADMIN — ALPRAZOLAM 0.5 MG: 0.5 TABLET ORAL at 20:30

## 2024-02-14 RX ADMIN — BUDESONIDE 500 MCG: 0.5 INHALANT RESPIRATORY (INHALATION) at 19:30

## 2024-02-14 RX ADMIN — LEVOTHYROXINE SODIUM 88 MCG: 0.09 TABLET ORAL at 08:54

## 2024-02-14 RX ADMIN — SODIUM CHLORIDE, PRESERVATIVE FREE 10 ML: 5 INJECTION INTRAVENOUS at 20:31

## 2024-02-14 RX ADMIN — GUAIFENESIN AND DEXTROMETHORPHAN 5 ML: 100; 10 SYRUP ORAL at 08:54

## 2024-02-14 RX ADMIN — ACETAMINOPHEN 650 MG: 325 TABLET ORAL at 05:49

## 2024-02-14 RX ADMIN — IPRATROPIUM BROMIDE AND ALBUTEROL SULFATE 1 DOSE: .5; 3 SOLUTION RESPIRATORY (INHALATION) at 07:36

## 2024-02-14 RX ADMIN — GUAIFENESIN AND DEXTROMETHORPHAN 5 ML: 100; 10 SYRUP ORAL at 20:30

## 2024-02-14 RX ADMIN — METOPROLOL SUCCINATE 50 MG: 50 TABLET, EXTENDED RELEASE ORAL at 08:54

## 2024-02-14 RX ADMIN — ALPRAZOLAM 0.5 MG: 0.5 TABLET ORAL at 08:54

## 2024-02-14 RX ADMIN — LISINOPRIL 40 MG: 20 TABLET ORAL at 08:54

## 2024-02-14 ASSESSMENT — PAIN SCALES - GENERAL
PAINLEVEL_OUTOF10: 0
PAINLEVEL_OUTOF10: 0

## 2024-02-14 NOTE — PLAN OF CARE
Problem: Safety - Adult  Goal: Free from fall injury  Outcome: /[facility] Progressing     Problem: Discharge Planning  Goal: Discharge to home or other facility with appropriate resources  Outcome: /[facility] Progressing     Problem: ABCDS Injury Assessment  Goal: Absence of physical injury  Outcome: /[facility] Progressing     Problem: Pain  Goal: Verbalizes/displays adequate comfort level or baseline comfort level  Outcome: /[facility] Progressing

## 2024-02-14 NOTE — PROGRESS NOTES
Hospitalist Progress Note      NAME:  Zoey Sneed   :  10/8/1930  MRM:  [id number]    Date/Time: 2024  12:13 PM           Assessment / Plan:     89-year old female comes to the hospital with chief complaint of shortness of breath and acute hypoxemic respiratory failure. Patient was recently diagnosed with UTI and urinary retention.  Patient has indwelling Javier catheter.  Chest x-ray is clear.  Her blood pressure is uncontrolled and she is still in A-fib.  She had a CTA chest done on 2024 which is negative for PE.     #Acute hypoxemic respiratory failure, currently on room air  #COPD with exacerbation, resolved  Chest x-ray is clear.  RVP: Neg  Patient had a CTA chest on 2024 which was negative for PE.    plan  IV steroids, switch to oral prednisone today  DuoNebs/Pulmicor  Added Mucinex    # Symptomatic hyponatremia  -Sodium 125 from 130 yesterday  -No neurological symptoms  Plan  Follow-up on serum osmolarity, urine osmolarity urine sodium  Placed patient on fluid restrictions  Follow-up with [facility] tomorrow     #Atrial fibrillation  Patient is on Eliquis at home.  Patient is also on Toprol-XL 50 mg daily.    # Hypertensive urgency, resolved  Patient takes lisinopril and metoprolol at home  Off Cardene drip  Vitals per protocol     #UTI (proteus) with urinary retention requiring javier catheter placement   -Voiding trial yesterday, Javier is off, urology following  Plan to replace Javier catheter if PVR is greater than 400 m     #Hyperlipidemia  Patient takes Lipitor 20 mg nightly.     #  Hypothyroidism  On Synthroid 88 mcg daily.       #Anxiety  On Xanax at home.    I have personally reviewed the radiographs, laboratory data in Epic and decisions and statements above are based partially on this personal interpretation.                Care Plan discussed with: Patient    Discussed:  Care Plan    Prophylaxis:  Lovenox    Disposition:  Home

## 2024-02-14 NOTE — CARE COORDINATION
CM continuing to monitor for transitional care planning needs. Patient may DC tomorrow. [place]family/friend will transport home. There are no identified CM needs at this time.     Lauren Riley, MS

## 2024-02-14 NOTE — PROGRESS NOTES
Chart reviewed. Spoke with OT. Patient demonstrates independent functional mobility and gait and has been up ad robyn in room. No PT needs identified. Will complete orders.    Thank you,  Leon Pacheco, PT, DPT

## 2024-02-14 NOTE — PROGRESS NOTES
Physician Progress Note      PATIENT:               BREANN GRAMAJO  [facility] #:                  [id number]  :                       10/8/1930  ADMIT DATE:       2024 11:04 AM  DISCH DATE:  RESPONDING  PROVIDER #:        Mike Cueva MD          QUERY TEXT:    Patient is admitted with COPD exacerbation, acute respiratory failure, and   UTI. Noted documentation of atrial fibrillation in the H&P on  with   patient on Eliquis. If possible, please document in progress notes and   discharge summary if you are evaluating and/or treating any of the following:    The medical record reflects the following:  Risk Factors: HTN, 89-year-old female  Clinical Indicators:  admitted with COPD exacerbation, acute respiratory   failure, and UTI  - noted to have Afib and maintained on Eliquis  Treatment: Eliquis 2.5 mg 2 times daily    Thank you,    Leda Santos RN  CDI  Options provided:  -- Secondary hypercoagulable state in a patient with atrial fibrillation  -- Other - I will add my own diagnosis  -- Disagree - Not applicable / Not valid  -- Disagree - Clinically unable to determine / Unknown  -- Refer to Clinical Documentation Reviewer    PROVIDER RESPONSE TEXT:    This patient has secondary hypercoagulable state in a patient with atrial   fibrillation.    Query created by: Leda Santos on 2024 11:19 AM      QUERY TEXT:    Pt admitted with acute resp failure, COPD exacerbation, and UTI.  Pt noted to   have indwelling Javier cath on H&P. If possible, please document in the   progress notes and discharge summary if you are evaluating and/or treating any   of the following:    The medical record reflects the following:  Risk Factors: UTI, advanced age, javier present  Clinical Indicators: admitted with acute resp failure, COPD exacerbation, and   UTI  - H&P notes, \"Patient has indwelling Javier catheter.\"  - Urology /3 ?UTI, javier catheter\"  Treatment:  Rocephin IV, Urology consult, monitoring    Thank you,    Leda

## 2024-02-14 NOTE — PLAN OF CARE
OCCUPATIONAL THERAPY EVALUATION/DISCHARGE  Patient: Zoey Sneed (89 y.o. female)  Date: 2/14/2024  Primary Diagnosis: Hypoxia [R09.02]  Other fatigue [R53.83]  Acute hypoxemic respiratory failure (HCC) [J96.01]  Dyspnea, unspecified type [R06.00]         Precautions:                    ASSESSMENT :  Patient received semi supine in bed A&OX4 and agreeable for OT eval/tx. Per pt report, [facility] and is independent for self care and functional transfers/mobility. Patient currently presents at independent functional status for self care and functional transfers/mobility. Patient with no acute OT needs at this time thus will D/C from with no acute OT needs (no SOB noted/expressed during evaluation, pt on room air). Recommend discharge to home independently when medically appropriate.     Functional Outcome Measure:  The patient scored 24/24 on the [facility] outcome measure     PLAN :  Recommend with staff: up to chair for meals, up to commode for toileting    Recommendation for discharge: (in order for the patient to meet his/her long term goals): No skilled occupational therapy    Other factors to consider for discharge: no additional factors    IF patient discharges home will need the following DME: none     SUBJECTIVE:   Patient stated, “I am feeling better.”    OBJECTIVE DATA SUMMARY:     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    Atrial fibrillation (HCC)     Atrophy of vulva 4/22/2009    Generalized anxiety disorder     Hx of bone density study 2/24/14    Normal    Hypertension     Postmenopausal atrophic vaginitis 4/22/2009    Symptomatic menopausal or female climacteric states     Unspecified asthma(493.90)     Unspecified hypothyroidism      Past Surgical History:   Procedure Laterality Date    HYSTERECTOMY, TOTAL ABDOMINAL (CERVIX REMOVED)  1970    W/ USO    OTHER SURGICAL HISTORY      Anterior

## 2024-02-15 LAB
ANION GAP SERPL CALC-SCNC: 7 MMOL/L (ref 5–15)
ANION GAP SERPL CALC-SCNC: 8 MMOL/L (ref 5–15)
BACTERIA SPEC CULT: NORMAL
BUN SERPL-MCNC: 21 MG/DL (ref 6–20)
BUN SERPL-MCNC: 23 MG/DL (ref 6–20)
BUN/CREAT SERPL: 24 (ref 12–20)
BUN/CREAT SERPL: 30 (ref 12–20)
CALCIUM SERPL-MCNC: 8.5 MG/DL (ref 8.5–10.1)
CALCIUM SERPL-MCNC: 9.1 MG/DL (ref 8.5–10.1)
CHLORIDE SERPL-SCNC: 95 MMOL/L (ref 97–108)
CHLORIDE SERPL-SCNC: 95 MMOL/L (ref 97–108)
CO2 SERPL-SCNC: 24 MMOL/L (ref 21–32)
CO2 SERPL-SCNC: 24 MMOL/L (ref 21–32)
CREAT SERPL-MCNC: 0.76 MG/DL (ref 0.55–1.02)
CREAT SERPL-MCNC: 0.86 MG/DL (ref 0.55–1.02)
ERYTHROCYTE [DISTWIDTH] IN BLOOD BY AUTOMATED COUNT: 13.2 % (ref 11.5–14.5)
GLUCOSE SERPL-MCNC: 136 MG/DL (ref 65–100)
GLUCOSE SERPL-MCNC: 173 MG/DL (ref 65–100)
GRAM STN SPEC: NORMAL
HCT VFR BLD AUTO: 36.6 % (ref 35–47)
HGB BLD-MCNC: 12.9 G/DL (ref 11.5–16)
MAGNESIUM SERPL-MCNC: 2.1 MG/DL (ref 1.6–2.4)
MCH RBC QN AUTO: 31.2 PG (ref 26–34)
MCHC RBC AUTO-ENTMCNC: 35.2 G/DL (ref 30–36.5)
MCV RBC AUTO: 88.4 FL (ref 80–99)
NRBC # BLD: 0 K/UL (ref 0–0.01)
NRBC BLD-RTO: 0 PER 100 WBC
PLATELET # BLD AUTO: 277 K/UL (ref 150–400)
PMV BLD AUTO: 10.2 FL (ref 8.9–12.9)
POTASSIUM SERPL-SCNC: 4.2 MMOL/L (ref 3.5–5.1)
POTASSIUM SERPL-SCNC: 4.6 MMOL/L (ref 3.5–5.1)
RBC # BLD AUTO: 4.14 M/UL (ref 3.8–5.2)
SERVICE CMNT-IMP: NORMAL
SODIUM SERPL-SCNC: 126 MMOL/L (ref 136–145)
SODIUM SERPL-SCNC: 127 MMOL/L (ref 136–145)
WBC # BLD AUTO: 15.4 K/UL (ref 3.6–11)

## 2024-02-15 PROCEDURE — 83735 ASSAY OF MAGNESIUM: CPT

## 2024-02-15 PROCEDURE — 2580000003 HC RX 258: Performed by: HOSPITALIST

## 2024-02-15 PROCEDURE — 80048 BASIC METABOLIC PNL TOTAL CA: CPT

## 2024-02-15 PROCEDURE — 6370000000 HC RX 637 (ALT 250 FOR IP): Performed by: HOSPITALIST

## 2024-02-15 PROCEDURE — 2580000003 HC RX 258: Performed by: INTERNAL MEDICINE

## 2024-02-15 PROCEDURE — 6370000000 HC RX 637 (ALT 250 FOR IP): Performed by: STUDENT IN AN ORGANIZED HEALTH CARE EDUCATION/TRAINING PROGRAM

## 2024-02-15 PROCEDURE — 94761 N-INVAS EAR/PLS OXIMETRY MLT: CPT

## 2024-02-15 PROCEDURE — 94640 AIRWAY INHALATION TREATMENT: CPT

## 2024-02-15 PROCEDURE — 85027 COMPLETE CBC AUTOMATED: CPT

## 2024-02-15 PROCEDURE — 6360000002 HC RX W HCPCS: Performed by: HOSPITALIST

## 2024-02-15 PROCEDURE — 6360000002 HC RX W HCPCS: Performed by: INTERNAL MEDICINE

## 2024-02-15 PROCEDURE — 6370000000 HC RX 637 (ALT 250 FOR IP): Performed by: INTERNAL MEDICINE

## 2024-02-15 PROCEDURE — 36415 COLL VENOUS BLD VENIPUNCTURE: CPT

## 2024-02-15 PROCEDURE — 1100000000 HC RM PRIVATE

## 2024-02-15 RX ORDER — SODIUM CHLORIDE, SODIUM LACTATE, POTASSIUM CHLORIDE, AND CALCIUM CHLORIDE .6; .31; .03; .02 G/100ML; G/100ML; G/100ML; G/100ML
1000 INJECTION, SOLUTION INTRAVENOUS ONCE
Status: COMPLETED | OUTPATIENT
Start: 2024-02-15 | End: 2024-02-15

## 2024-02-15 RX ADMIN — GUAIFENESIN AND DEXTROMETHORPHAN 5 ML: 100; 10 SYRUP ORAL at 16:40

## 2024-02-15 RX ADMIN — LEVOTHYROXINE SODIUM 88 MCG: 0.09 TABLET ORAL at 08:10

## 2024-02-15 RX ADMIN — ALPRAZOLAM 0.5 MG: 0.5 TABLET ORAL at 22:46

## 2024-02-15 RX ADMIN — BUDESONIDE 500 MCG: 0.5 INHALANT RESPIRATORY (INHALATION) at 07:40

## 2024-02-15 RX ADMIN — BUDESONIDE 500 MCG: 0.5 INHALANT RESPIRATORY (INHALATION) at 20:16

## 2024-02-15 RX ADMIN — GUAIFENESIN AND DEXTROMETHORPHAN 5 ML: 100; 10 SYRUP ORAL at 08:10

## 2024-02-15 RX ADMIN — IPRATROPIUM BROMIDE AND ALBUTEROL SULFATE 1 DOSE: .5; 3 SOLUTION RESPIRATORY (INHALATION) at 20:13

## 2024-02-15 RX ADMIN — PREDNISONE 40 MG: 20 TABLET ORAL at 08:10

## 2024-02-15 RX ADMIN — GUAIFENESIN AND DEXTROMETHORPHAN 5 ML: 100; 10 SYRUP ORAL at 05:44

## 2024-02-15 RX ADMIN — SODIUM CHLORIDE, PRESERVATIVE FREE 10 ML: 5 INJECTION INTRAVENOUS at 08:13

## 2024-02-15 RX ADMIN — IPRATROPIUM BROMIDE AND ALBUTEROL SULFATE 1 DOSE: .5; 3 SOLUTION RESPIRATORY (INHALATION) at 12:58

## 2024-02-15 RX ADMIN — IPRATROPIUM BROMIDE AND ALBUTEROL SULFATE 1 DOSE: .5; 3 SOLUTION RESPIRATORY (INHALATION) at 07:40

## 2024-02-15 RX ADMIN — SODIUM CHLORIDE, PRESERVATIVE FREE 10 ML: 5 INJECTION INTRAVENOUS at 22:47

## 2024-02-15 RX ADMIN — SODIUM CHLORIDE, POTASSIUM CHLORIDE, SODIUM LACTATE AND CALCIUM CHLORIDE 1000 ML: 600; 310; 30; 20 INJECTION, SOLUTION INTRAVENOUS at 08:22

## 2024-02-15 RX ADMIN — METOPROLOL SUCCINATE 50 MG: 50 TABLET, EXTENDED RELEASE ORAL at 08:10

## 2024-02-15 RX ADMIN — GUAIFENESIN AND DEXTROMETHORPHAN 5 ML: 100; 10 SYRUP ORAL at 22:45

## 2024-02-15 RX ADMIN — APIXABAN 2.5 MG: 2.5 TABLET, FILM COATED ORAL at 22:46

## 2024-02-15 RX ADMIN — WATER 1000 MG: 1 INJECTION INTRAMUSCULAR; INTRAVENOUS; SUBCUTANEOUS at 16:38

## 2024-02-15 RX ADMIN — GUAIFENESIN AND DEXTROMETHORPHAN 5 ML: 100; 10 SYRUP ORAL at 13:06

## 2024-02-15 RX ADMIN — APIXABAN 2.5 MG: 2.5 TABLET, FILM COATED ORAL at 08:10

## 2024-02-15 RX ADMIN — ATORVASTATIN CALCIUM 20 MG: 20 TABLET, FILM COATED ORAL at 22:46

## 2024-02-15 RX ADMIN — LISINOPRIL 40 MG: 20 TABLET ORAL at 08:10

## 2024-02-15 RX ADMIN — ALPRAZOLAM 0.5 MG: 0.5 TABLET ORAL at 08:10

## 2024-02-15 ASSESSMENT — PAIN SCALES - GENERAL
PAINLEVEL_OUTOF10: 0

## 2024-02-15 NOTE — PLAN OF CARE
Problem: Safety - Adult  Goal: Free from fall injury  2/14/2024 2353 by Jena Syed RN  Outcome: Progressing  2/14/2024 1244 by Kyra Garrido LPN  Outcome: HH/[facility] Progressing     Problem: Discharge Planning  Goal: Discharge to home or other facility with appropriate resources  2/14/2024 2353 by Jena Syed RN  Outcome: Progressing  2/14/2024 1244 by Kyra Garrido LPN  Outcome: HH/[facility] Progressing     Problem: ABCDS Injury Assessment  Goal: Absence of physical injury  2/14/2024 2353 by Jena Syed RN  Outcome: Progressing  2/14/2024 1244 by Kyra Garrido LPN  Outcome: HH/[facility] Progressing     Problem: Pain  Goal: Verbalizes/displays adequate comfort level or baseline comfort level  2/14/2024 2353 by Jena Syed RN  Outcome: Progressing  2/14/2024 1244 by Kyra Garrido LPN  Outcome: HH/[facility] Progressing

## 2024-02-15 NOTE — PLAN OF CARE
Problem: Safety - Adult  Goal: Free from fall injury  2/15/2024 0956 by Kyra Garrido LPN  Outcome: HH/[facility] Progressing  2/14/2024 2353 by Jena Syed RN  Outcome: Progressing     Problem: Discharge Planning  Goal: Discharge to home or other facility with appropriate resources  2/15/2024 0956 by Kyra Garrido LPN  Outcome: /[facility] Progressing  2/14/2024 2353 by Jena Syed RN  Outcome: Progressing     Problem: ABCDS Injury Assessment  Goal: Absence of physical injury  2/15/2024 0956 by Kyra Garrido LPN  Outcome: HH/[facility] Progressing  2/14/2024 2353 by Jena Syed RN  Outcome: Progressing     Problem: Pain  Goal: Verbalizes/displays adequate comfort level or baseline comfort level  2/15/2024 0956 by Kyra Garrido LPN  Outcome: /[facility] Progressing  2/14/2024 2353 by Jena Syed RN  Outcome: Progressing

## 2024-02-15 NOTE — PROGRESS NOTES
Hospitalist Progress Note      NAME:  Zoey Sneed   :  10/8/1930  MRM:  [id number]    Date/Time: 2/15/2024  7:50 AM           Assessment / Plan:     89-year old female comes to the hospital with chief complaint of shortness of breath and acute hypoxemic respiratory failure. Patient was recently diagnosed with UTI and urinary retention.  Patient has indwelling Javier catheter.  Chest x-ray is clear.  Her blood pressure is uncontrolled and she is still in A-fib.  She had a CTA chest done on 2024 which is negative for PE.     #Acute hypoxemic respiratory failure, currently on room air  #COPD with exacerbation, resolved  Chest x-ray is clear.  RVP: Neg  Patient had a CTA chest on 2024 which was negative for PE.    plan  IV steroids, switch to oral prednisone   DuoNebs/Pulmicor  Added Mucinex    #hyponatremia: Urine studies suggest hypovolemic nature, low solute   - 1L IVF now; recheck this afternoon       #Atrial fibrillation  Patient is on Eliquis at home.  Patient is also on Toprol-XL 50 mg daily.    # Hypertensive urgency, resolved  Patient takes lisinopril and metoprolol at home  Off Cardene drip  Vitals per protocol     #UTI (proteus) with urinary retention requiring javier catheter placement   -Voiding trial yesterday, Javier is off, urology following  Plan to replace Javier catheter if PVR is greater than 400 m     #Hyperlipidemia  Patient takes Lipitor 20 mg nightly.     #  Hypothyroidism  On Synthroid 88 mcg daily.       #Anxiety  On Xanax at home.    I have personally reviewed the radiographs, laboratory data in Epic and decisions and statements above are based partially on this personal interpretation.                Care Plan discussed with: Patient    Discussed:  Care Plan    Prophylaxis:  Lovenox    Disposition:  Home w/Family  to independent living           ___________________________________________________    Attending Physician: Mike Cardenas MD        Subjective:     No acute events  overnight. She feels fine. We review plan for treatment of her low sodium. All questions answered. She would like to go home tmr    ROS:  (bold if positive, if negative)    Tolerating PT  Tolerating Diet          Objective:       Vitals:          Last 24hrs VS reviewed since prior progress note. Most recent are:    Vitals:    02/15/24 0715   BP: 134/76   Pulse: 77   Resp: 18   Temp: 98.2 °F (36.8 °C)   SpO2: 100%     SpO2 Readings from Last 6 Encounters:   02/15/24 100%   02/09/24 97%   02/08/24 (!) 79%   01/26/24 97%   01/24/24 97%   01/17/24 100%          Intake/Output Summary (Last 24 hours) at 2/15/2024 0750  Last data filed at 2/15/2024 0302  Gross per 24 hour   Intake --   Output 0 ml   Net 0 ml            Exam:     Physical Exam:    Gen: Pleasant elderly, in no acute distress  HEENT:  Pink conjunctivae, PERRL, hearing intact to voice, moist mucous membranes  Neck:  Supple, without masses, thyroid non-tender  Resp: Normal work of breathing, no accessory muscle use  Card:  No murmurs, normal S1, S2 without thrills, bruits or peripheral edema  Abd:  Soft, non-tender, non-distended, normoactive bowel sounds are present  Musc:  No cyanosis or clubbing  Skin:  No rashes or ulcers, skin turgor is good  Neuro:  Cranial nerves 3-12 are grossly intact,  strength is 5/5 bilaterally and dorsi / plantarflexion is 5/5 bilaterally, follows commands appropriately  Psych:  Good insight, oriented to person, place and time, alert       Telemetry reviewed:   normal sinus rhythm    Medications Reviewed: (see below)    Lab Data Reviewed: (see below)    ______________________________________________________________________    Medications:     Current Facility-Administered Medications   Medication Dose Route Frequency    lactated ringers bolus bolus 1,000 mL  1,000 mL IntraVENous Once    predniSONE (DELTASONE) tablet 40 mg  40 mg Oral Daily    ipratropium 0.5 mg-albuterol 2.5 mg (DUONEB) nebulizer solution 1 Dose  1 Dose

## 2024-02-16 VITALS
HEIGHT: 64 IN | SYSTOLIC BLOOD PRESSURE: 118 MMHG | WEIGHT: 121 LBS | TEMPERATURE: 97.3 F | BODY MASS INDEX: 20.66 KG/M2 | OXYGEN SATURATION: 98 % | RESPIRATION RATE: 18 BRPM | HEART RATE: 94 BPM | DIASTOLIC BLOOD PRESSURE: 52 MMHG

## 2024-02-16 LAB
ANION GAP SERPL CALC-SCNC: 4 MMOL/L (ref 5–15)
BUN SERPL-MCNC: 19 MG/DL (ref 6–20)
BUN/CREAT SERPL: 29 (ref 12–20)
CALCIUM SERPL-MCNC: 8.7 MG/DL (ref 8.5–10.1)
CHLORIDE SERPL-SCNC: 100 MMOL/L (ref 97–108)
CO2 SERPL-SCNC: 26 MMOL/L (ref 21–32)
CREAT SERPL-MCNC: 0.66 MG/DL (ref 0.55–1.02)
ERYTHROCYTE [DISTWIDTH] IN BLOOD BY AUTOMATED COUNT: 13.8 % (ref 11.5–14.5)
GLUCOSE SERPL-MCNC: 95 MG/DL (ref 65–100)
HCT VFR BLD AUTO: 35.2 % (ref 35–47)
HGB BLD-MCNC: 12.4 G/DL (ref 11.5–16)
MAGNESIUM SERPL-MCNC: 2 MG/DL (ref 1.6–2.4)
MCH RBC QN AUTO: 31.6 PG (ref 26–34)
MCHC RBC AUTO-ENTMCNC: 35.2 G/DL (ref 30–36.5)
MCV RBC AUTO: 89.8 FL (ref 80–99)
NRBC # BLD: 0 K/UL (ref 0–0.01)
NRBC BLD-RTO: 0 PER 100 WBC
PLATELET # BLD AUTO: 228 K/UL (ref 150–400)
PMV BLD AUTO: 10.2 FL (ref 8.9–12.9)
POTASSIUM SERPL-SCNC: 4.2 MMOL/L (ref 3.5–5.1)
RBC # BLD AUTO: 3.92 M/UL (ref 3.8–5.2)
SODIUM SERPL-SCNC: 130 MMOL/L (ref 136–145)
WBC # BLD AUTO: 9.6 K/UL (ref 3.6–11)

## 2024-02-16 PROCEDURE — 80048 BASIC METABOLIC PNL TOTAL CA: CPT

## 2024-02-16 PROCEDURE — 6360000002 HC RX W HCPCS: Performed by: INTERNAL MEDICINE

## 2024-02-16 PROCEDURE — 85027 COMPLETE CBC AUTOMATED: CPT

## 2024-02-16 PROCEDURE — 36415 COLL VENOUS BLD VENIPUNCTURE: CPT

## 2024-02-16 PROCEDURE — 6370000000 HC RX 637 (ALT 250 FOR IP): Performed by: STUDENT IN AN ORGANIZED HEALTH CARE EDUCATION/TRAINING PROGRAM

## 2024-02-16 PROCEDURE — 6370000000 HC RX 637 (ALT 250 FOR IP): Performed by: HOSPITALIST

## 2024-02-16 PROCEDURE — 83735 ASSAY OF MAGNESIUM: CPT

## 2024-02-16 PROCEDURE — 6370000000 HC RX 637 (ALT 250 FOR IP): Performed by: INTERNAL MEDICINE

## 2024-02-16 PROCEDURE — 94640 AIRWAY INHALATION TREATMENT: CPT

## 2024-02-16 PROCEDURE — 2580000003 HC RX 258: Performed by: HOSPITALIST

## 2024-02-16 RX ORDER — GUAIFENESIN/DEXTROMETHORPHAN 100-10MG/5
5 SYRUP ORAL
Qty: 120 ML | Refills: 0 | Status: SHIPPED | OUTPATIENT
Start: 2024-02-16 | End: 2024-02-26

## 2024-02-16 RX ORDER — PREDNISONE 20 MG/1
40 TABLET ORAL DAILY
Qty: 6 TABLET | Refills: 0 | Status: SHIPPED | OUTPATIENT
Start: 2024-02-16 | End: 2024-02-19

## 2024-02-16 RX ORDER — ALBUTEROL SULFATE 2.5 MG/3ML
2.5 SOLUTION RESPIRATORY (INHALATION) 4 TIMES DAILY PRN
Qty: 120 EACH | Refills: 3 | Status: SHIPPED | OUTPATIENT
Start: 2024-02-16

## 2024-02-16 RX ORDER — ALPRAZOLAM 0.5 MG/1
0.5 TABLET ORAL 2 TIMES DAILY
Qty: 6 TABLET | Refills: 0 | Status: SHIPPED | OUTPATIENT
Start: 2024-02-16 | End: 2024-02-19

## 2024-02-16 RX ORDER — BUDESONIDE AND FORMOTEROL FUMARATE DIHYDRATE 160; 4.5 UG/1; UG/1
2 AEROSOL RESPIRATORY (INHALATION) 2 TIMES DAILY
Qty: 10.2 G | Refills: 3 | Status: SHIPPED | OUTPATIENT
Start: 2024-02-16

## 2024-02-16 RX ADMIN — PREDNISONE 40 MG: 20 TABLET ORAL at 08:41

## 2024-02-16 RX ADMIN — GUAIFENESIN AND DEXTROMETHORPHAN 5 ML: 100; 10 SYRUP ORAL at 08:41

## 2024-02-16 RX ADMIN — ALPRAZOLAM 0.5 MG: 0.5 TABLET ORAL at 08:41

## 2024-02-16 RX ADMIN — BUDESONIDE 500 MCG: 0.5 INHALANT RESPIRATORY (INHALATION) at 07:24

## 2024-02-16 RX ADMIN — SODIUM CHLORIDE, PRESERVATIVE FREE 10 ML: 5 INJECTION INTRAVENOUS at 08:42

## 2024-02-16 RX ADMIN — APIXABAN 2.5 MG: 2.5 TABLET, FILM COATED ORAL at 08:41

## 2024-02-16 RX ADMIN — METOPROLOL SUCCINATE 50 MG: 50 TABLET, EXTENDED RELEASE ORAL at 08:41

## 2024-02-16 RX ADMIN — LEVOTHYROXINE SODIUM 88 MCG: 0.09 TABLET ORAL at 08:41

## 2024-02-16 RX ADMIN — GUAIFENESIN AND DEXTROMETHORPHAN 5 ML: 100; 10 SYRUP ORAL at 04:30

## 2024-02-16 RX ADMIN — LISINOPRIL 40 MG: 20 TABLET ORAL at 08:41

## 2024-02-16 RX ADMIN — IPRATROPIUM BROMIDE AND ALBUTEROL SULFATE 1 DOSE: .5; 3 SOLUTION RESPIRATORY (INHALATION) at 07:24

## 2024-02-16 NOTE — DISCHARGE SUMMARY
Hospitalist Discharge Summary     Patient ID:  Zoey Sneed  [id number]  89 y.o.  10/8/1930    Admit date: 2/12/2024    Discharge date and time: 2/16/2024    Admission Diagnoses: Hypoxia [R09.02]  Other fatigue [R53.83]  Acute hypoxemic respiratory failure (HCC) [J96.01]  Dyspnea, unspecified type [R06.00]    Discharge Diagnoses:    Principal Problem:    Acute hypoxemic respiratory failure (HCC)  Resolved Problems:    * No resolved hospital problems. *         Hospital Course:   89-year old female comes to the hospital with chief complaint of shortness of breath and acute hypoxemic respiratory failure. She was admitted for further evaluation and treatment of the following:         #Acute hypoxemic respiratory failure d/t COPD exacerbation: Required supplemental oxygen but discharges on room air.   RVP: Neg  Patient had a CTA chest on 2/8/2024 which was negative for PE.    plan  Discharges on PO pred; refilled inhalers     #hyponatremia: Urine studies suggest hypovolemic nature, low solute. Improved now to her baseline                #Atrial fibrillation  Patient is on Eliquis at home.  Patient is also on Toprol-XL 50 mg daily.     # Hypertensive urgency, resolved  Patient takes lisinopril and metoprolol at home  Required Cardene drip       #UTI (proteus) with urinary retention requiring javier catheter placement   -Voiding trial cleared; completed abx     #Hyperlipidemia  Patient takes Lipitor 20 mg nightly.     #  Hypothyroidism  On Synthroid 88 mcg daily.        #Anxiety  On Xanax at home.    Imaging  XR CHEST PORTABLE    Result Date: 2/12/2024  No acute process.        PCP: Jessie Lord MD     Consults: pulmonary/intensive care and urology    Condition of patient at discharge: Good and Improved    Discharge Exam:    Physical Exam:    Gen: Well-developed, well-nourished, in no acute distress  HEENT:  Pink conjunctivae, PERRL, hearing intact to voice, moist mucous membranes  Neck: Supple, without masses,

## 2024-02-16 NOTE — PLAN OF CARE
Problem: Safety - Adult  Goal: Free from fall injury  Outcome: Adequate for Discharge     Problem: Discharge Planning  Goal: Discharge to home or other facility with appropriate resources  Outcome: Adequate for Discharge  Flowsheets (Taken 2/15/2024 2303 by Kimmy White, RN)  Discharge to home or other facility with appropriate resources: Identify barriers to discharge with patient and caregiver     Problem: ABCDS Injury Assessment  Goal: Absence of physical injury  Outcome: Adequate for Discharge     Problem: Pain  Goal: Verbalizes/displays adequate comfort level or baseline comfort level  Outcome: Adequate for Discharge     Problem: Respiratory - Adult  Goal: Achieves optimal ventilation and oxygenation  2/16/2024 1035 by Shruthi Mendosa, RN  Outcome: Adequate for Discharge  2/16/2024 0413 by Nancy Tomlin, RT  Outcome: Progressing  Flowsheets (Taken 2/16/2024 0413)  Achieves optimal ventilation and oxygenation: Assess for changes in respiratory status  2/16/2024 0411 by Nancy Tomlin, RT  Outcome: Progressing  Flowsheets (Taken 2/16/2024 0411)  Achieves optimal ventilation and oxygenation: Assess for changes in respiratory status

## 2024-02-16 NOTE — PROGRESS NOTES
0900: Pt son-in-law to transport. He is at work this AM. Pt to notify RN of time when available.    1330: Reviewed discharge instructions with patient. Opportunity for questions provided. Patient verbalized understanding. IV and telemetry removed.

## 2024-02-16 NOTE — CARE COORDINATION
Care Management Progress Note      ICD-10-CM    1. Hypoxia  R09.02       2. Dyspnea, unspecified type  R06.00       3. Other fatigue  R53.83       4. Anxiety  F41.9 ALPRAZolam (XANAX) 0.5 MG tablet          RUR:  12%  Risk Level: [x]Low []Moderate []High    Transition of care plan:  Per IDR, patient is medically ready for discharge today.    Dispo: return to [facility]. This CM has called [facility] [phone] and notified Suzanne of patient's return.    Outpatient follow-up.    Pt's family- son in law to transport.               02/16/24 1210   Service Assessment   Patient Orientation Alert and Oriented   Cognition Alert   History Provided By Patient   Discharge Planning   Patient expects to be discharged to: Independent living facility   Services At/After Discharge   Transition of Care Consult (CM Consult) N/A   Services At/After Discharge None   Mode of Transport at Discharge Other (see comment)  (son in law)           ___________________________________________   Chantelle Duncan RN Case Manager  2/16/2024   12:18 PM

## 2024-02-16 NOTE — DISCHARGE INSTRUCTIONS
HOSPITALIST DISCHARGE INSTRUCTIONS  NAME:  Zoey Sneed   :  10/8/1930   MRN:  [id number]     Date/Time:  2024 7:28 AM    ADMIT DATE: 2024     DISCHARGE DATE: 2024     DISCHARGE DIAGNOSIS:  Acute Hypoxic Respiratory Failure due to COPD Exacerbation  Urinary Tract Infection  Hyponatremia (low sodium)    DISCHARGE INSTRUCTIONS:  Thank you for allowing us to participate in your care. Your discharging Hospitalist is Mike Cardenas MD. You were admitted for evaluation and treatment of the above. You required supplemental oxygen but with steroids and antibiotics, you improved and no longer require this. You do need to complete several more days of steroids. You have completed antibiotics. Your sodium level is back to your baseline but please follow up with your Primary Care Doctor next week for a recheck (blood draw).      MEDICATIONS:    It is important that you take the medication exactly as they are prescribed.   Keep your medication in the bottles provided by the pharmacist and keep a list of the medication names, dosages, and times to be taken in your wallet.   Do not take other medications without consulting your doctor.             If you experience any of the following symptoms then please call your primary care physician or return to the emergency room if you cannot get hold of your doctor:  Fever, chills, nausea, vomiting, diarrhea, change in mentation, falling, bleeding, shortness of breath    Follow Up:  Please call the below provider to arrange hospital follow up appointment      Jessie Lord MD  [facility] [place] [address] [phone]    Follow up in 1 week(s)        For questions regarding your Hospitalization or to contact the Hospital Medicine team, please call [phone].      Information obtained by :  I understand that if any problems occur once I am at home I am to contact my physician.    I understand and acknowledge receipt of the instructions indicated

## 2024-02-19 ENCOUNTER — HOSPITAL ENCOUNTER (EMERGENCY)
Facility: HOSPITAL | Age: 89
Discharge: HOME | End: 2024-02-19
Attending: STUDENT IN AN ORGANIZED HEALTH CARE EDUCATION/TRAINING PROGRAM
Payer: MEDICARE

## 2024-02-19 VITALS
WEIGHT: 121 LBS | HEART RATE: 80 BPM | BODY MASS INDEX: 20.66 KG/M2 | DIASTOLIC BLOOD PRESSURE: 94 MMHG | SYSTOLIC BLOOD PRESSURE: 183 MMHG | HEIGHT: 64 IN | RESPIRATION RATE: 16 BRPM | OXYGEN SATURATION: 98 % | TEMPERATURE: 98.6 F

## 2024-02-19 DIAGNOSIS — I10 HYPERTENSION, UNSPECIFIED TYPE: Primary | ICD-10-CM

## 2024-02-19 LAB
ANION GAP SERPL CALC-SCNC: 10 MMOL/L (ref 5–15)
BASOPHILS # BLD: 0 K/UL (ref 0–1)
BASOPHILS NFR BLD: 0 % (ref 0–1)
BUN SERPL-MCNC: 20 MG/DL (ref 8–23)
BUN/CREAT SERPL: 40 (ref 12–20)
CALCIUM SERPL-MCNC: 8.9 MG/DL (ref 8.2–9.6)
CHLORIDE SERPL-SCNC: 96 MMOL/L (ref 98–107)
CO2 SERPL-SCNC: 25 MMOL/L (ref 22–29)
CREAT SERPL-MCNC: 0.5 MG/DL (ref 0.5–0.9)
DIFFERENTIAL METHOD BLD: ABNORMAL
EOSINOPHIL # BLD: 0.1 K/UL (ref 0–0.4)
EOSINOPHIL NFR BLD: 1 %
ERYTHROCYTE [DISTWIDTH] IN BLOOD BY AUTOMATED COUNT: 13.5 % (ref 11.5–14.5)
GLUCOSE SERPL-MCNC: 84 MG/DL (ref 65–100)
HCT VFR BLD AUTO: 40.6 % (ref 35–47)
HGB BLD-MCNC: 13.9 G/DL (ref 11.5–16)
IMM GRANULOCYTES # BLD AUTO: 0.1 K/UL (ref 0–0.04)
IMM GRANULOCYTES NFR BLD AUTO: 1 % (ref 0–0.5)
LYMPHOCYTES # BLD: 2.7 K/UL (ref 0.8–3.5)
LYMPHOCYTES NFR BLD: 22 % (ref 12–49)
MCH RBC QN AUTO: 31 PG (ref 26–34)
MCHC RBC AUTO-ENTMCNC: 34.2 G/DL (ref 30–36.5)
MCV RBC AUTO: 90.6 FL (ref 80–99)
MONOCYTES # BLD: 1.2 K/UL (ref 0–1)
MONOCYTES NFR BLD: 10 % (ref 5–13)
NEUTS SEG # BLD: 7.9 K/UL (ref 1.8–8)
NEUTS SEG NFR BLD: 66 % (ref 32–75)
NRBC # BLD: 0 K/UL (ref 0–0.01)
NRBC BLD-RTO: 0 PER 100 WBC
PLATELET # BLD AUTO: 269 K/UL (ref 150–400)
PMV BLD AUTO: 9.8 FL (ref 8.9–12.9)
POTASSIUM SERPL-SCNC: 4.2 MMOL/L (ref 3.5–5.1)
RBC # BLD AUTO: 4.48 M/UL (ref 3.8–5.2)
SODIUM SERPL-SCNC: 131 MMOL/L (ref 136–145)
WBC # BLD AUTO: 12.1 K/UL (ref 3.6–11)

## 2024-02-19 PROCEDURE — 85025 COMPLETE CBC W/AUTO DIFF WBC: CPT

## 2024-02-19 PROCEDURE — 99283 EMERGENCY DEPT VISIT LOW MDM: CPT

## 2024-02-19 PROCEDURE — 36415 COLL VENOUS BLD VENIPUNCTURE: CPT

## 2024-02-19 PROCEDURE — 80048 BASIC METABOLIC PNL TOTAL CA: CPT

## 2024-02-19 ASSESSMENT — PAIN - FUNCTIONAL ASSESSMENT: PAIN_FUNCTIONAL_ASSESSMENT: NONE - DENIES PAIN

## 2024-02-19 ASSESSMENT — ENCOUNTER SYMPTOMS: SHORTNESS OF BREATH: 0

## 2024-02-19 NOTE — ED NOTES
Pt given discharge instructions, patient education, 0 prescriptions, and follow up information. Pt verbalizes understanding. All questions answered. Pt discharged to home in private vehicle, ambulatory with family. Pt A&Ox4, RA

## 2024-02-19 NOTE — ED NOTES
Warm blanket given. Pt.continually apologetic for calling EMS. Emotional support given. Call bell at bedside . Rails up x 2

## 2024-02-19 NOTE — ED PROVIDER NOTES
[facility] [place]  EMERGENCY DEPARTMENT ENCOUNTER      Pt Name: Zoey Sneed  MRN: [id number]  Birthdate 10/8/1930  Date of evaluation: 2/19/2024  Provider: Terry Bill MD    CHIEF COMPLAINT     No chief complaint on file.        HISTORY OF PRESENT ILLNESS   89-year-old female with history significant for HTN, anxiety, A-fib on Eliquis presents to the ED with chief complaint of high blood pressure.  Patient says she woke up at 430 this morning, felt a little lightheaded, and took her blood pressure and found it to be elevated prompting her to call EMS.  No vision changes, numbness, weakness, speech difficulty, chest pain, difficulty breathing, urinary symptoms, leg swelling, or any other symptoms.  She otherwise feels her normal self.  No room spinning dizziness.  No recent changes with her blood pressure medications.  She has been compliant with her medications.    The history is provided by the patient and the EMS personnel.       Review of External Medical Records:     Nursing Notes were reviewed.    REVIEW OF SYSTEMS       Review of Systems   Respiratory:  Negative for shortness of breath.    Cardiovascular:  Negative for chest pain.       Except as noted above the remainder of the review of systems was reviewed and negative.       PAST MEDICAL HISTORY     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    Atrial fibrillation (HCC)     Atrophy of vulva 4/22/2009    Generalized anxiety disorder     Hx of bone density study 2/24/14    Normal    Hypertension     Postmenopausal atrophic vaginitis 4/22/2009    Symptomatic menopausal or female climacteric states     Unspecified asthma(493.90)     Unspecified hypothyroidism          SURGICAL HISTORY       Past Surgical History:   Procedure Laterality Date    HYSTERECTOMY, TOTAL ABDOMINAL (CERVIX REMOVED)  1970    W/ USO    OTHER SURGICAL HISTORY      Anterior Posterior Repair    THYROIDECTOMY, PARTIAL

## 2024-02-19 NOTE — ED TRIAGE NOTES
Pt c/o of high blood pressure and feeling dizzy that started this morning.  No s/s of distress obs. Skin warm and dry, resp and neuro intact.

## 2024-02-28 ENCOUNTER — TELEPHONE (OUTPATIENT)
Age: 89
End: 2024-02-28

## 2024-02-28 ENCOUNTER — NURSE ONLY (OUTPATIENT)
Age: 89
End: 2024-02-28
Payer: MEDICARE

## 2024-02-28 DIAGNOSIS — R33.9 INCOMPLETE EMPTYING OF BLADDER: Primary | ICD-10-CM

## 2024-02-28 DIAGNOSIS — Z91.89 AT RISK OF UTI: ICD-10-CM

## 2024-02-28 LAB
BILIRUBIN, URINE, POC: NEGATIVE
BLOOD URINE, POC: ABNORMAL
GLUCOSE URINE, POC: NEGATIVE
KETONES, URINE, POC: NEGATIVE
LEUKOCYTE ESTERASE, URINE, POC: ABNORMAL
NITRITE, URINE, POC: POSITIVE
PH, URINE, POC: 6.5 (ref 4.6–8)
PROTEIN,URINE, POC: 30
SPECIFIC GRAVITY, URINE, POC: 1.02 (ref 1–1.03)
URINALYSIS CLARITY, POC: ABNORMAL
URINALYSIS COLOR, POC: YELLOW
UROBILINOGEN, POC: ABNORMAL

## 2024-02-28 PROCEDURE — 81001 URINALYSIS AUTO W/SCOPE: CPT | Performed by: UROLOGY

## 2024-02-28 PROCEDURE — 99213 OFFICE O/P EST LOW 20 MIN: CPT | Performed by: NURSE PRACTITIONER

## 2024-02-28 PROCEDURE — 1123F ACP DISCUSS/DSCN MKR DOCD: CPT | Performed by: NURSE PRACTITIONER

## 2024-02-28 NOTE — PROGRESS NOTES
Nurse visit only. Not seen by provider.    Nurse visit for urine dip.     Orders:  Culture and Micro

## 2024-02-28 NOTE — TELEPHONE ENCOUNTER
Spoke with patient to see if she could come in and drop off a urine sample. She stated that she would give me a call back once she is able to find a ride.

## 2024-02-28 NOTE — TELEPHONE ENCOUNTER
Pt says she was seen in the hospital and ever since she now thinks she has a UTI. Wants to know if a abx can be sent in until she's able to get in to be seen

## 2024-02-29 DIAGNOSIS — N39.0 COMPLICATED UTI (URINARY TRACT INFECTION): Primary | ICD-10-CM

## 2024-02-29 LAB
APPEARANCE UR: ABNORMAL
BACTERIA #/AREA URNS HPF: ABNORMAL /[HPF]
BILIRUB UR QL STRIP: NEGATIVE
CASTS URNS QL MICRO: ABNORMAL /LPF
COLOR UR: YELLOW
EPI CELLS #/AREA URNS HPF: ABNORMAL /HPF (ref 0–10)
GLUCOSE UR QL STRIP: NEGATIVE
HGB UR QL STRIP: ABNORMAL
KETONES UR QL STRIP: NEGATIVE
LEUKOCYTE ESTERASE UR QL STRIP: ABNORMAL
MICRO URNS: ABNORMAL
NITRITE UR QL STRIP: POSITIVE
PH UR STRIP: 6.5 [PH] (ref 5–7.5)
PROT UR QL STRIP: ABNORMAL
RBC #/AREA URNS HPF: ABNORMAL /HPF (ref 0–2)
SP GR UR STRIP: 1.01 (ref 1–1.03)
UROBILINOGEN UR STRIP-MCNC: 0.2 MG/DL (ref 0.2–1)
WBC #/AREA URNS HPF: >30 /HPF (ref 0–5)

## 2024-02-29 RX ORDER — CEFDINIR 300 MG/1
300 CAPSULE ORAL 2 TIMES DAILY
Qty: 28 CAPSULE | Refills: 0 | Status: SHIPPED | OUTPATIENT
Start: 2024-02-29 | End: 2024-03-14

## 2024-02-29 NOTE — PROGRESS NOTES
Patient tolerated Omnicef from hospital visit.  I confirmed with her today that she had no issues taking the medication despite PCN and sulfa documented allergies.     RX was sent.    Dr. Groves wants to see her mid-course.  She prefers Wed in [place], which would be the 6th.  Can you schedule her for that date with him for straight catheterization?  She also has apt on the 14th.  That should be combined with the apt on the 6th and cancelled.

## 2024-03-01 LAB — BACTERIA UR CULT: ABNORMAL

## 2024-03-04 RX ORDER — NITROFURANTOIN 25; 75 MG/1; MG/1
100 CAPSULE ORAL 2 TIMES DAILY
Qty: 28 CAPSULE | Refills: 0 | Status: SHIPPED | OUTPATIENT
Start: 2024-03-04 | End: 2024-03-18

## 2024-03-06 ENCOUNTER — OFFICE VISIT (OUTPATIENT)
Age: 89
End: 2024-03-06
Payer: MEDICARE

## 2024-03-06 VITALS
HEIGHT: 64 IN | RESPIRATION RATE: 14 BRPM | SYSTOLIC BLOOD PRESSURE: 105 MMHG | WEIGHT: 121 LBS | BODY MASS INDEX: 20.66 KG/M2 | HEART RATE: 65 BPM | DIASTOLIC BLOOD PRESSURE: 61 MMHG | OXYGEN SATURATION: 97 %

## 2024-03-06 DIAGNOSIS — R31.9 URINARY TRACT INFECTION WITH HEMATURIA, SITE UNSPECIFIED: ICD-10-CM

## 2024-03-06 DIAGNOSIS — N30.00 ACUTE CYSTITIS WITHOUT HEMATURIA: ICD-10-CM

## 2024-03-06 DIAGNOSIS — N39.490 OVERFLOW INCONTINENCE OF URINE: ICD-10-CM

## 2024-03-06 DIAGNOSIS — R33.9 INCOMPLETE EMPTYING OF BLADDER: Primary | ICD-10-CM

## 2024-03-06 DIAGNOSIS — N39.0 URINARY TRACT INFECTION WITH HEMATURIA, SITE UNSPECIFIED: ICD-10-CM

## 2024-03-06 LAB
BILIRUBIN, URINE, POC: NEGATIVE
BLOOD URINE, POC: ABNORMAL
GLUCOSE URINE, POC: NEGATIVE
KETONES, URINE, POC: NEGATIVE
LEUKOCYTE ESTERASE, URINE, POC: ABNORMAL
NITRITE, URINE, POC: NEGATIVE
PH, URINE, POC: 7 (ref 4.6–8)
PROTEIN,URINE, POC: NEGATIVE
PVR, POC: ABNORMAL CC
SPECIFIC GRAVITY, URINE, POC: 1.01 (ref 1–1.03)
URINALYSIS CLARITY, POC: CLEAR
URINALYSIS COLOR, POC: YELLOW
UROBILINOGEN, POC: ABNORMAL

## 2024-03-06 PROCEDURE — G8427 DOCREV CUR MEDS BY ELIG CLIN: HCPCS | Performed by: UROLOGY

## 2024-03-06 PROCEDURE — 1090F PRES/ABSN URINE INCON ASSESS: CPT | Performed by: UROLOGY

## 2024-03-06 PROCEDURE — 81001 URINALYSIS AUTO W/SCOPE: CPT | Performed by: UROLOGY

## 2024-03-06 PROCEDURE — 1123F ACP DISCUSS/DSCN MKR DOCD: CPT | Performed by: UROLOGY

## 2024-03-06 PROCEDURE — 1036F TOBACCO NON-USER: CPT | Performed by: UROLOGY

## 2024-03-06 PROCEDURE — 51701 INSERT BLADDER CATHETER: CPT | Performed by: UROLOGY

## 2024-03-06 PROCEDURE — 99214 OFFICE O/P EST MOD 30 MIN: CPT | Performed by: UROLOGY

## 2024-03-06 PROCEDURE — 1111F DSCHRG MED/CURRENT MED MERGE: CPT | Performed by: UROLOGY

## 2024-03-06 PROCEDURE — G8484 FLU IMMUNIZE NO ADMIN: HCPCS | Performed by: UROLOGY

## 2024-03-06 PROCEDURE — G8420 CALC BMI NORM PARAMETERS: HCPCS | Performed by: UROLOGY

## 2024-03-06 PROCEDURE — 0509F URINE INCON PLAN DOCD: CPT | Performed by: UROLOGY

## 2024-03-06 PROCEDURE — 51798 US URINE CAPACITY MEASURE: CPT | Performed by: UROLOGY

## 2024-03-06 NOTE — PROGRESS NOTES
Chief Complaint   Patient presents with    Follow-up     incontinence of urine     1. Have you been to the ER, urgent care clinic since your last visit?  Hospitalized since your last visit?No    2. Have you seen or consulted any other health care providers outside of the [facility] since your last visit?  Include any pap smears or colon screening. No  /61 (Site: Right Upper Arm, Position: Sitting, Cuff Size: Medium Adult)   Pulse 65   Resp 14   Ht 1.626 m (5' 4\")   Wt 54.9 kg (121 lb)   SpO2 97%   BMI 20.77 kg/m²

## 2024-03-06 NOTE — ASSESSMENT & PLAN NOTE
She is trying to remember to take her bethanechol.  Straight catheterization today to to empty her bladder to help clear her urine infection.

## 2024-03-06 NOTE — PROGRESS NOTES
HISTORY OF PRESENT ILLNESS  Zoey Sneed is a 89 y.o. female.   has a past medical history of Acquired absence of both cervix and uterus, Atrial fibrillation (HCC), Atrophy of vulva, Generalized anxiety disorder, Hx of bone density study, Hypertension, Postmenopausal atrophic vaginitis, Symptomatic menopausal or female climacteric states, Unspecified asthma(493.90), and Unspecified hypothyroidism.  has a past surgical history that includes Thyroidectomy, partial; Hysterectomy, total abdominal (1970); Tonsillectomy; Urological Surgery; and other surgical history.  Chief Complaint   Patient presents with    Follow-up     incontinence of urine     Incomplete emptying with persistent infection.     She is being treated for a urinary infection.  Urine culture 2/28/2024 with Citrobacter, resistant to Augmentin, multiple cephalosporins.  She was started on cefdinir initially and transition to nitrofurantoin.    Nitrofurantoin started 3/4/2024 until 3/18/2024.    She has chronic incomplete emptying of the bladder.  Bladder scan today was 441.  She is trying to take bethanechol 4 times a day.        1. Incomplete emptying of bladder  Overview:  Unable to void with >450 cc in the bladder on evaluation.  She was advised on timed voiding and started on bethanechol.  She has had multiple issues with side effects and has not been able to consistently take the medication.  Assessment & Plan:   She is trying to remember to take her bethanechol.  Straight catheterization today to to empty her bladder to help clear her urine infection.  Orders:  -     AMB POC PVR, JULES,POST-VOID RES,US,NON-IMAGING  2. Urinary tract infection with hematuria, site unspecified  -     Culture, Urine  3. Overflow incontinence of urine  Overview:  Overflow incontinence with some component of urge.   Assessment & Plan:  Improved with awareness of incomplete emptying.  4. Acute cystitis without hematuria  Assessment & Plan:  Continue nitrofurantoin.  Urine

## 2024-03-07 LAB — BACTERIA UR CULT: NO GROWTH

## 2024-03-20 ENCOUNTER — OFFICE VISIT (OUTPATIENT)
Age: 89
End: 2024-03-20
Payer: MEDICARE

## 2024-03-20 VITALS
OXYGEN SATURATION: 94 % | WEIGHT: 121 LBS | HEIGHT: 62 IN | SYSTOLIC BLOOD PRESSURE: 137 MMHG | DIASTOLIC BLOOD PRESSURE: 67 MMHG | HEART RATE: 84 BPM | RESPIRATION RATE: 14 BRPM | BODY MASS INDEX: 22.26 KG/M2

## 2024-03-20 DIAGNOSIS — N30.00 ACUTE CYSTITIS WITHOUT HEMATURIA: Primary | ICD-10-CM

## 2024-03-20 DIAGNOSIS — R33.9 INCOMPLETE EMPTYING OF BLADDER: ICD-10-CM

## 2024-03-20 LAB
BILIRUBIN, URINE, POC: NEGATIVE
BLOOD URINE, POC: ABNORMAL
GLUCOSE URINE, POC: NEGATIVE
KETONES, URINE, POC: NEGATIVE
LEUKOCYTE ESTERASE, URINE, POC: ABNORMAL
NITRITE, URINE, POC: NEGATIVE
PH, URINE, POC: 6.5 (ref 4.6–8)
PROTEIN,URINE, POC: 30
PVR, POC: ABNORMAL CC
SPECIFIC GRAVITY, URINE, POC: 1.01 (ref 1–1.03)
URINALYSIS CLARITY, POC: ABNORMAL
URINALYSIS COLOR, POC: ABNORMAL
UROBILINOGEN, POC: ABNORMAL

## 2024-03-20 PROCEDURE — 99214 OFFICE O/P EST MOD 30 MIN: CPT | Performed by: UROLOGY

## 2024-03-20 PROCEDURE — G8420 CALC BMI NORM PARAMETERS: HCPCS | Performed by: UROLOGY

## 2024-03-20 PROCEDURE — G8427 DOCREV CUR MEDS BY ELIG CLIN: HCPCS | Performed by: UROLOGY

## 2024-03-20 PROCEDURE — 81001 URINALYSIS AUTO W/SCOPE: CPT | Performed by: UROLOGY

## 2024-03-20 PROCEDURE — 51798 US URINE CAPACITY MEASURE: CPT | Performed by: UROLOGY

## 2024-03-20 PROCEDURE — 1090F PRES/ABSN URINE INCON ASSESS: CPT | Performed by: UROLOGY

## 2024-03-20 PROCEDURE — 1123F ACP DISCUSS/DSCN MKR DOCD: CPT | Performed by: UROLOGY

## 2024-03-20 PROCEDURE — 1036F TOBACCO NON-USER: CPT | Performed by: UROLOGY

## 2024-03-20 PROCEDURE — G8484 FLU IMMUNIZE NO ADMIN: HCPCS | Performed by: UROLOGY

## 2024-03-20 RX ORDER — NITROFURANTOIN 25; 75 MG/1; MG/1
100 CAPSULE ORAL 2 TIMES DAILY
Qty: 90 CAPSULE | Refills: 1 | Status: SHIPPED | OUTPATIENT
Start: 2024-03-20

## 2024-03-20 RX ORDER — DOXYCYCLINE HYCLATE 100 MG
100 TABLET ORAL 2 TIMES DAILY
Qty: 14 TABLET | Refills: 0 | Status: SHIPPED | OUTPATIENT
Start: 2024-03-20 | End: 2024-03-27

## 2024-03-20 NOTE — PROGRESS NOTES
HISTORY OF PRESENT ILLNESS  Zoey Sneed is a 89 y.o. female.   has a past medical history of Acquired absence of both cervix and uterus, Atrial fibrillation (HCC), Atrophy of vulva, Generalized anxiety disorder, Hx of bone density study, Hypertension, Postmenopausal atrophic vaginitis, Symptomatic menopausal or female climacteric states, Unspecified asthma(493.90), and Unspecified hypothyroidism.  has a past surgical history that includes Thyroidectomy, partial; Hysterectomy, total abdominal (1970); Tonsillectomy; Urological Surgery; and other surgical history.  Chief Complaint   Patient presents with    Follow-up     Urinary incontinence     She still has an odor to the urine.  She had citrobacter on culture 2/28/24.  Urine 3/6/24 without growth.     She finished nitrofurantoin 3/18/24.  She tolerated it well.      She has improvement in nocturia with evening fluid restriction.    Bladder scan improved today at 151cc.  She tries to take bethanechol qid.            2/28/24  Citrobacter freundii (1)    Antibiotic Interpretation Microscan  Method Status    amoxicillin-clavulanate Resistant R ug/mL Not Specified Final    ceFAZolin Resistant R ug/mL Not Specified Final    cefepime Sensitive S ug/mL Not Specified Final    cefTRIAXone Resistant R ug/mL Not Specified Final    cefuroxime Resistant R ug/mL Not Specified Final    ciprofloxacin Sensitive S ug/mL Not Specified Final    gentamicin Sensitive S ug/mL Not Specified Final    imipenem Sensitive S ug/mL Not Specified Final    levofloxacin Sensitive S ug/mL Not Specified Final    meropenem Sensitive S ug/mL Not Specified Final    nitrofurantoin Sensitive S ug/mL Not Specified Final    tetracycline Sensitive S ug/mL Not Specified Final    tobramycin Sensitive S ug/mL Not Specified Final    trimethoprim-sulfamethoxazole Sensitive S ug/mL Not Specified Final      1. Acute cystitis without hematuria  Assessment & Plan:   She has persistent UTIs and incomplete emptying.  I

## 2024-03-20 NOTE — PROGRESS NOTES
Chief Complaint   Patient presents with    Follow-up     Urinary incontinence     1. Have you been to the ER, urgent care clinic since your last visit?  Hospitalized since your last visit?No    2. Have you seen or consulted any other health care providers outside of the [facility] since your last visit?  Include any pap smears or colon screening. No  /67 (Site: Right Upper Arm, Position: Sitting, Cuff Size: Medium Adult)   Pulse 84   Resp 14   Ht 1.575 m (5' 2\")   Wt 54.9 kg (121 lb)   SpO2 94%   BMI 22.13 kg/m²

## 2024-03-20 NOTE — ASSESSMENT & PLAN NOTE
She has persistent UTIs and incomplete emptying.  I will treat her with a therapeutic course of doxycycline.  She can take Allegra or Zyrtec with it in case of hives.  After that we will try daily nitrofurantoin for suppression.

## 2024-03-22 LAB — BACTERIA UR CULT: ABNORMAL

## 2024-03-25 RX ORDER — CEFDINIR 300 MG/1
300 CAPSULE ORAL 2 TIMES DAILY
Qty: 14 CAPSULE | Refills: 0 | Status: SHIPPED | OUTPATIENT
Start: 2024-03-25 | End: 2024-04-01

## 2024-04-09 ENCOUNTER — TELEPHONE (OUTPATIENT)
Age: 89
End: 2024-04-09

## 2024-04-09 NOTE — TELEPHONE ENCOUNTER
I can send Diflucan- oral medication but she has a trigger for sulfur allergy.  Can you see if she has ever take Diflucan.    If not, she should do the OTC vaginal inserts.

## 2024-04-09 NOTE — TELEPHONE ENCOUNTER
I spoke to patient she is going to manage by eating more when she takes the meds.  She stated she did that today and feels a lot better.

## 2024-04-09 NOTE — TELEPHONE ENCOUNTER
I spoke to patient this morning, she is having symptoms of yeast infection and wants to continue all of her antibiotics that have been prescribed but wanted to know if you could send something or advise something about yeast infection???    Please advise and ill call patient back  New # [phone]

## 2024-04-18 ENCOUNTER — TELEPHONE (OUTPATIENT)
Age: 89
End: 2024-04-18

## 2024-05-08 ENCOUNTER — OFFICE VISIT (OUTPATIENT)
Age: 89
End: 2024-05-08
Payer: MEDICARE

## 2024-05-08 VITALS
OXYGEN SATURATION: 99 % | HEIGHT: 64 IN | SYSTOLIC BLOOD PRESSURE: 149 MMHG | BODY MASS INDEX: 20.49 KG/M2 | HEART RATE: 66 BPM | WEIGHT: 120 LBS | DIASTOLIC BLOOD PRESSURE: 89 MMHG

## 2024-05-08 DIAGNOSIS — R33.9 INCOMPLETE EMPTYING OF BLADDER: ICD-10-CM

## 2024-05-08 DIAGNOSIS — N30.00 ACUTE CYSTITIS WITHOUT HEMATURIA: Primary | ICD-10-CM

## 2024-05-08 LAB
BILIRUBIN, URINE, POC: NEGATIVE
BLOOD URINE, POC: NORMAL
GLUCOSE URINE, POC: NEGATIVE
KETONES, URINE, POC: NEGATIVE
LEUKOCYTE ESTERASE, URINE, POC: NORMAL
NITRITE, URINE, POC: NEGATIVE
PH, URINE, POC: 7 (ref 4.6–8)
PROTEIN,URINE, POC: NEGATIVE
PVR, POC: NORMAL CC
SPECIFIC GRAVITY, URINE, POC: 1.01 (ref 1–1.03)
URINALYSIS CLARITY, POC: NORMAL
URINALYSIS COLOR, POC: YELLOW
UROBILINOGEN, POC: NORMAL

## 2024-05-08 PROCEDURE — 1123F ACP DISCUSS/DSCN MKR DOCD: CPT | Performed by: UROLOGY

## 2024-05-08 PROCEDURE — 99214 OFFICE O/P EST MOD 30 MIN: CPT | Performed by: UROLOGY

## 2024-05-08 PROCEDURE — 81003 URINALYSIS AUTO W/O SCOPE: CPT | Performed by: UROLOGY

## 2024-05-08 PROCEDURE — G8420 CALC BMI NORM PARAMETERS: HCPCS | Performed by: UROLOGY

## 2024-05-08 PROCEDURE — G8427 DOCREV CUR MEDS BY ELIG CLIN: HCPCS | Performed by: UROLOGY

## 2024-05-08 PROCEDURE — 1090F PRES/ABSN URINE INCON ASSESS: CPT | Performed by: UROLOGY

## 2024-05-08 PROCEDURE — 51798 US URINE CAPACITY MEASURE: CPT | Performed by: UROLOGY

## 2024-05-08 PROCEDURE — 1036F TOBACCO NON-USER: CPT | Performed by: UROLOGY

## 2024-05-08 NOTE — PROGRESS NOTES
Chief Complaint   Patient presents with    Follow-up    Vaginal Pain     1. Have you been to the ER, urgent care clinic since your last visit?  Hospitalized since your last visit?No    2. Have you seen or consulted any other health care providers outside of the [facility] since your last visit?  Include any pap smears or colon screening. No  BP (!) 149/89 (Site: Right Upper Arm, Position: Sitting, Cuff Size: Medium Adult)   Pulse 66   Ht 1.626 m (5' 4\")   Wt 54.4 kg (120 lb)   SpO2 99%   BMI 20.60 kg/m²

## 2024-05-08 NOTE — ASSESSMENT & PLAN NOTE
Urinalysis today with large leukocyte Estrace.  She has incomplete emptying which is an issue.  She recently completed vaginal suppositories for yeast infection.  I will send the urine for culture.    If she has recurrent infection we discussed catheterization and bladder irrigation in the office.  Will see her back next week.

## 2024-05-08 NOTE — PROGRESS NOTES
HISTORY OF PRESENT ILLNESS  Zoey Sneed is a 89 y.o. female.   has a past medical history of Acquired absence of both cervix and uterus, Atrial fibrillation (HCC), Atrophy of vulva, Generalized anxiety disorder, Hx of bone density study, Hypertension, Postmenopausal atrophic vaginitis, Symptomatic menopausal or female climacteric states, Unspecified asthma(493.90), and Unspecified hypothyroidism.  has a past surgical history that includes Thyroidectomy, partial; Hysterectomy, total abdominal (1970); Tonsillectomy; Urological Surgery; and other surgical history.  Chief Complaint   Patient presents with    Follow-up    Vaginal Pain     She was last seen 3/20/2024.    She has a history of incomplete emptying of the bladder on bethanechol.  She feels it helps initially.  She was treated for UTI end of February with Citrobacter.  Her urine had cleared up.    She has sent messages to mother April with complaints of symptoms of yeast infection.  She has a sulfa allergy documented which flags for Diflucan.  She has tried miconazole.  Not sure she is able to apply this well.  She had called and said her symptoms went away with her suppositories but still has genital pain.    She feels irritation at the vaginal opening. She feels the whitish discharge has improved.     She remains on nitrofurantoin twice a day.      Vaginal Pain        1. Acute cystitis without hematuria  Assessment & Plan:  Urinalysis today with large leukocyte Estrace.  She has incomplete emptying which is an issue.  She recently completed vaginal suppositories for yeast infection.  I will send the urine for culture.    If she has recurrent infection we discussed catheterization and bladder irrigation in the office.  Will see her back next week.  Orders:  -     AMB POC URINALYSIS DIP STICK AUTO W/O MICRO  -     Culture, Urine  -     AMB POC PVR, JULES,POST-VOID RES,US,NON-IMAGING  2. Incomplete emptying of bladder  Overview:  Unable to void with >450 cc in the

## 2024-05-08 NOTE — ASSESSMENT & PLAN NOTE
Postvoid residual 234 cc on bladder scan today.  This will affect her ability to clear her infection.  I will wait for urine culture to come back before treating her.  I think we need to do straight catheterization and possible bladder irrigation to address her recurrent cystitis.

## 2024-05-11 LAB — BACTERIA UR CULT: ABNORMAL

## 2024-05-13 PROBLEM — N30.90 RECURRENT CYSTITIS: Status: ACTIVE | Noted: 2024-01-24

## 2024-05-13 PROBLEM — Z91.89 AT RISK OF UTI: Status: RESOLVED | Noted: 2023-07-19 | Resolved: 2024-05-13

## 2024-05-14 ENCOUNTER — HOSPITAL ENCOUNTER (EMERGENCY)
Facility: HOSPITAL | Age: 89
Discharge: HOME | End: 2024-05-14
Attending: STUDENT IN AN ORGANIZED HEALTH CARE EDUCATION/TRAINING PROGRAM
Payer: MEDICARE

## 2024-05-14 ENCOUNTER — OFFICE VISIT (OUTPATIENT)
Age: 89
End: 2024-05-14
Payer: MEDICARE

## 2024-05-14 ENCOUNTER — TELEPHONE (OUTPATIENT)
Age: 89
End: 2024-05-14

## 2024-05-14 VITALS
WEIGHT: 116 LBS | RESPIRATION RATE: 16 BRPM | OXYGEN SATURATION: 98 % | TEMPERATURE: 97.7 F | SYSTOLIC BLOOD PRESSURE: 136 MMHG | HEART RATE: 87 BPM | DIASTOLIC BLOOD PRESSURE: 82 MMHG | HEIGHT: 64 IN | BODY MASS INDEX: 19.81 KG/M2

## 2024-05-14 VITALS
BODY MASS INDEX: 19.81 KG/M2 | DIASTOLIC BLOOD PRESSURE: 77 MMHG | WEIGHT: 116 LBS | SYSTOLIC BLOOD PRESSURE: 127 MMHG | HEART RATE: 79 BPM | HEIGHT: 64 IN

## 2024-05-14 DIAGNOSIS — R30.0 DYSURIA: Primary | ICD-10-CM

## 2024-05-14 DIAGNOSIS — Z78.9 DRUG INTOLERANCE: ICD-10-CM

## 2024-05-14 DIAGNOSIS — N39.490 OVERFLOW INCONTINENCE OF URINE: ICD-10-CM

## 2024-05-14 DIAGNOSIS — N30.90 RECURRENT CYSTITIS: Primary | ICD-10-CM

## 2024-05-14 DIAGNOSIS — R33.9 INCOMPLETE EMPTYING OF BLADDER: ICD-10-CM

## 2024-05-14 DIAGNOSIS — Z87.448 HISTORY OF BLADDER SUSPENSION PROCEDURE: ICD-10-CM

## 2024-05-14 DIAGNOSIS — Z98.890 HISTORY OF BLADDER SUSPENSION PROCEDURE: ICD-10-CM

## 2024-05-14 LAB
APPEARANCE UR: ABNORMAL
BACTERIA URNS QL MICRO: ABNORMAL /HPF
BILIRUB UR QL: NEGATIVE
COLOR UR: ABNORMAL
EPITH CASTS URNS QL MICRO: ABNORMAL /LPF
GLUCOSE UR STRIP.AUTO-MCNC: 100 MG/DL
HGB UR QL STRIP: ABNORMAL
KETONES UR QL STRIP.AUTO: NEGATIVE MG/DL
LEUKOCYTE ESTERASE UR QL STRIP.AUTO: ABNORMAL
NITRITE UR QL STRIP.AUTO: NEGATIVE
PH UR STRIP: 7 (ref 5–8)
PROT UR STRIP-MCNC: >300 MG/DL
RBC #/AREA URNS HPF: ABNORMAL /HPF
SP GR UR REFRACTOMETRY: 1.02 (ref 1–1.03)
SPECIMEN HOLD: NORMAL
UROBILINOGEN UR QL STRIP.AUTO: 0.2 EU/DL (ref 0.2–1)
WBC URNS QL MICRO: ABNORMAL /HPF (ref 0–4)

## 2024-05-14 PROCEDURE — 0509F URINE INCON PLAN DOCD: CPT | Performed by: UROLOGY

## 2024-05-14 PROCEDURE — 1090F PRES/ABSN URINE INCON ASSESS: CPT | Performed by: UROLOGY

## 2024-05-14 PROCEDURE — 6370000000 HC RX 637 (ALT 250 FOR IP): Performed by: STUDENT IN AN ORGANIZED HEALTH CARE EDUCATION/TRAINING PROGRAM

## 2024-05-14 PROCEDURE — 1036F TOBACCO NON-USER: CPT | Performed by: UROLOGY

## 2024-05-14 PROCEDURE — 99214 OFFICE O/P EST MOD 30 MIN: CPT | Performed by: UROLOGY

## 2024-05-14 PROCEDURE — 51798 US URINE CAPACITY MEASURE: CPT

## 2024-05-14 PROCEDURE — 1123F ACP DISCUSS/DSCN MKR DOCD: CPT | Performed by: UROLOGY

## 2024-05-14 PROCEDURE — 87086 URINE CULTURE/COLONY COUNT: CPT

## 2024-05-14 PROCEDURE — 51700 IRRIGATION OF BLADDER: CPT | Performed by: UROLOGY

## 2024-05-14 PROCEDURE — G8420 CALC BMI NORM PARAMETERS: HCPCS | Performed by: UROLOGY

## 2024-05-14 PROCEDURE — 99283 EMERGENCY DEPT VISIT LOW MDM: CPT

## 2024-05-14 PROCEDURE — G8427 DOCREV CUR MEDS BY ELIG CLIN: HCPCS | Performed by: UROLOGY

## 2024-05-14 PROCEDURE — 81001 URINALYSIS AUTO W/SCOPE: CPT

## 2024-05-14 RX ORDER — CEFDINIR 300 MG/1
300 CAPSULE ORAL 2 TIMES DAILY
Qty: 10 CAPSULE | Refills: 0 | Status: SHIPPED | OUTPATIENT
Start: 2024-05-14 | End: 2024-05-19

## 2024-05-14 RX ORDER — PHENAZOPYRIDINE HYDROCHLORIDE 100 MG/1
200 TABLET, FILM COATED ORAL ONCE
Status: COMPLETED | OUTPATIENT
Start: 2024-05-14 | End: 2024-05-14

## 2024-05-14 RX ORDER — PHENAZOPYRIDINE HYDROCHLORIDE 100 MG/1
100 TABLET, FILM COATED ORAL 3 TIMES DAILY PRN
Qty: 9 TABLET | Refills: 0 | Status: SHIPPED | OUTPATIENT
Start: 2024-05-14 | End: 2024-05-17

## 2024-05-14 RX ORDER — ESTRADIOL 0.1 MG/G
CREAM VAGINAL
Qty: 42.5 G | Refills: 3 | Status: SHIPPED | OUTPATIENT
Start: 2024-05-14

## 2024-05-14 RX ADMIN — PHENAZOPYRIDINE 200 MG: 100 TABLET ORAL at 19:15

## 2024-05-14 ASSESSMENT — PAIN DESCRIPTION - LOCATION: LOCATION: GROIN;OTHER (COMMENT)

## 2024-05-14 ASSESSMENT — PAIN SCALES - GENERAL: PAINLEVEL_OUTOF10: 6

## 2024-05-14 ASSESSMENT — PAIN - FUNCTIONAL ASSESSMENT: PAIN_FUNCTIONAL_ASSESSMENT: 0-10

## 2024-05-14 NOTE — PROGRESS NOTES
HISTORY OF PRESENT ILLNESS  Zoey nSeed is a 89 y.o. female.   has a past medical history of Acquired absence of both cervix and uterus, Atrial fibrillation (HCC), Atrophy of vulva, Generalized anxiety disorder, Hx of bone density study, Hypertension, Postmenopausal atrophic vaginitis, Symptomatic menopausal or female climacteric states, Unspecified asthma(493.90), and Unspecified hypothyroidism.  has a past surgical history that includes Thyroidectomy, partial; Hysterectomy, total abdominal (1970); Tonsillectomy; Urological Surgery; and other surgical history.  Chief Complaint   Patient presents with    Follow-up    Cystitis     Mild irritation in the bladder.  She is using estradiol cream which she thinks helps.   She finished prophylactic nitrofuratoin.    Urine culture with Pseudomonas. Unclear if contaminant.   She continues bethanechol        1. Recurrent cystitis  Overview:  Recurrent cystitis with incomplete emptying.  Assessment & Plan:  Continue Estrace.   Catheterize urine sent for culture. Will treat for Pseudomonas UTI.  Cefdinir rx.  Bladder drained and irrigated with antiseptic solution today.   Orders:  -     cefdinir (OMNICEF) 300 MG capsule; Take 1 capsule by mouth 2 times daily for 5 days, Disp-10 capsule, R-0Normal  -     CULTURE, URINE; Future  2. Incomplete emptying of bladder  Overview:  Unable to void with >450 cc in the bladder on evaluation.  She was advised on timed voiding and started on bethanechol.  She has had multiple issues with side effects and has not been able to consistently take the medication.  Assessment & Plan:  300cc in bladder but pt was pushing fluids.  Incomplete emptying a risk for UTIs  3. Drug intolerance  Overview:  She has many drug allergies or intolerances.  4. History of bladder suspension procedure  Overview:  Incomplete emptying but without hypersuspension on evaluation (9/2023).  5. Overflow incontinence of urine  Overview:  Overflow incontinence with some

## 2024-05-14 NOTE — ASSESSMENT & PLAN NOTE
Continue Estrace.   Catheterize urine sent for culture. Will treat for Pseudomonas UTI.  Cefdinir rx.  Bladder drained and irrigated with antiseptic solution today.

## 2024-05-14 NOTE — TELEPHONE ENCOUNTER
Pt called stating she was in pain after procedure done today and was told to come back into the office. Her daughter called back and stated they could not get here before 5pm, per jd we told pt to go to [facility]

## 2024-05-14 NOTE — PROGRESS NOTES
Chief Complaint   Patient presents with    Follow-up    Cystitis     /77 (Site: Left Upper Arm, Position: Sitting)   Pulse 79   Ht 1.626 m (5' 4\")   Wt 52.6 kg (116 lb)   BMI 19.91 kg/m²   1. Have you been to the ER, urgent care clinic since your last visit?  Hospitalized since your last visit?No    2. Have you seen or consulted any other health care providers outside of the [facility] since your last visit?  Include any pap smears or colon screening. No

## 2024-05-14 NOTE — ED TRIAGE NOTES
Pt was seen at [facility] today to have their bladder irrigated with a \"soapy saline\" and has had burning and pain in  bladder ever since. Pt states they are miserable. Pt is able to urinate but causing burning when they do

## 2024-05-15 LAB
BACTERIA SPEC CULT: NORMAL
SERVICE CMNT-IMP: NORMAL

## 2024-05-15 NOTE — TELEPHONE ENCOUNTER
ER notes reviewed; pt could not get to the office by 5 pm.    I spoke to Ms. Sneed today and she reportedly feels much better.  CC was dysuria after bladder irrigation.    Resolved with pyridium.  She will let us know if it returns.    She has many drug allergies and it is her opinion that she may have been allergic to the irrigation, or overly sensitive in that area.      Voiding fine today.  No straight cath.  Feels much better overall, but will call with return of symptoms.

## 2024-05-15 NOTE — ED PROVIDER NOTES
[facility] [place] EMERGENCY DEPT  EMERGENCY DEPARTMENT ENCOUNTER      Pt Name: Zoey Sneed  MRN: [id number]  Birthdate 10/8/1930  Date of evaluation: 5/14/2024  Provider: Terry Bill MD    CHIEF COMPLAINT     No chief complaint on file.        HISTORY OF PRESENT ILLNESS   89-year-old female with history significant for A-fib, HTN, anxiety, frequent UTIs presents to the ED with chief complaint of bladder pain and dysuria starting today after having procedure done at [facility].  Patient had her bladder irrigated with \"soapy water\" and since then has had this burning.  No fevers, chills, abdominal pain, bowel symptoms, or any other symptoms.  She says she was told she currently has a UTI but urology is attempting this treatment instead of antibiotics given history of frequent UTIs with resistant organisms.    The history is provided by the patient.       Review of External Medical Records:     Nursing Notes were reviewed.    REVIEW OF SYSTEMS       Review of Systems   Respiratory:  Negative for shortness of breath.    Cardiovascular:  Negative for chest pain.       Except as noted above the remainder of the review of systems was reviewed and negative.       PAST MEDICAL HISTORY     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    Atrial fibrillation (HCC)     Atrophy of vulva 4/22/2009    Generalized anxiety disorder     Hx of bone density study 2/24/14    Normal    Hypertension     Postmenopausal atrophic vaginitis 4/22/2009    Symptomatic menopausal or female climacteric states     Unspecified asthma(493.90)     Unspecified hypothyroidism          SURGICAL HISTORY       Past Surgical History:   Procedure Laterality Date    HYSTERECTOMY, TOTAL ABDOMINAL (CERVIX REMOVED)  1970    W/ USO    OTHER SURGICAL HISTORY      Anterior Posterior Repair    THYROIDECTOMY, PARTIAL      TONSILLECTOMY      UROLOGICAL SURGERY      TVT         CURRENT MEDICATIONS       Discharge

## 2024-05-18 LAB — BACTERIA UR CULT: ABNORMAL

## 2024-05-23 ENCOUNTER — TELEPHONE (OUTPATIENT)
Age: 89
End: 2024-05-23

## 2024-05-23 NOTE — TELEPHONE ENCOUNTER
Pt is coming in at 8:15 to see dr. Groves Friday 5/24, her appt 6/26 needs to be rescheduled to [facility] when she comes in unless dr. Groves no longer needs to see her.

## 2024-05-24 ENCOUNTER — OFFICE VISIT (OUTPATIENT)
Age: 89
End: 2024-05-24
Payer: MEDICARE

## 2024-05-24 VITALS
OXYGEN SATURATION: 98 % | WEIGHT: 116 LBS | RESPIRATION RATE: 16 BRPM | BODY MASS INDEX: 19.81 KG/M2 | HEART RATE: 77 BPM | DIASTOLIC BLOOD PRESSURE: 55 MMHG | HEIGHT: 64 IN | SYSTOLIC BLOOD PRESSURE: 99 MMHG

## 2024-05-24 DIAGNOSIS — N30.90 RECURRENT CYSTITIS: ICD-10-CM

## 2024-05-24 DIAGNOSIS — R30.0 DYSURIA: Primary | ICD-10-CM

## 2024-05-24 DIAGNOSIS — N39.41 URGE INCONTINENCE OF URINE: ICD-10-CM

## 2024-05-24 DIAGNOSIS — N95.2 ATROPHIC VAGINITIS: ICD-10-CM

## 2024-05-24 LAB
BILIRUBIN, URINE, POC: NEGATIVE
BLOOD URINE, POC: ABNORMAL
GLUCOSE URINE, POC: NEGATIVE
KETONES, URINE, POC: NEGATIVE
LEUKOCYTE ESTERASE, URINE, POC: ABNORMAL
NITRITE, URINE, POC: POSITIVE
PH, URINE, POC: 6 (ref 4.6–8)
PROTEIN,URINE, POC: NEGATIVE
SPECIFIC GRAVITY, URINE, POC: 1.01 (ref 1–1.03)
URINALYSIS CLARITY, POC: CLEAR
URINALYSIS COLOR, POC: YELLOW
UROBILINOGEN, POC: ABNORMAL

## 2024-05-24 PROCEDURE — 99214 OFFICE O/P EST MOD 30 MIN: CPT | Performed by: UROLOGY

## 2024-05-24 PROCEDURE — 1123F ACP DISCUSS/DSCN MKR DOCD: CPT | Performed by: UROLOGY

## 2024-05-24 PROCEDURE — G8427 DOCREV CUR MEDS BY ELIG CLIN: HCPCS | Performed by: UROLOGY

## 2024-05-24 PROCEDURE — G8420 CALC BMI NORM PARAMETERS: HCPCS | Performed by: UROLOGY

## 2024-05-24 PROCEDURE — 1036F TOBACCO NON-USER: CPT | Performed by: UROLOGY

## 2024-05-24 PROCEDURE — 81003 URINALYSIS AUTO W/O SCOPE: CPT | Performed by: UROLOGY

## 2024-05-24 PROCEDURE — 1090F PRES/ABSN URINE INCON ASSESS: CPT | Performed by: UROLOGY

## 2024-05-24 PROCEDURE — 0509F URINE INCON PLAN DOCD: CPT | Performed by: UROLOGY

## 2024-05-24 NOTE — PROGRESS NOTES
No chief complaint on file.    1. Have you been to the ER, urgent care clinic since your last visit?  Hospitalized since your last visit? Yes Burning and pain     2. Have you seen or consulted any other health care providers outside of the [facility] since your last visit?  Include any pap smears or colon screening. No  BP (!) 99/55   Pulse 77   Resp 16   Ht 1.626 m (5' 4\")   Wt 52.6 kg (116 lb)   SpO2 98%   BMI 19.91 kg/m²     
stinging after using estradiol cream.    Orders:  -     AMB POC URINALYSIS DIP STICK AUTO W/O MICRO  4. Urge incontinence of urine  Assessment & Plan:  Bethanechol helps her void.  She can hold her urine but wears some pads in case of urgency   Orders:  -     AMB POC URINALYSIS DIP STICK AUTO W/O MICRO      Return in about 3 months (around 8/24/2024).   Armani Groves MD       Please note that portions of this note was potentially completed with Dragon dictation, the computer voice recognition software.  Quite often unanticipated grammatical, syntax, homophones, and other interpretive errors are inadvertently transcribed by the computer software.  Please disregard these errors.  Please excuse any errors that have escaped final proofreading.  Thank you.

## 2024-05-24 NOTE — PATIENT INSTRUCTIONS
[email or web address]  [facility] (discount code)  [id number]    [employer]  D-mannose

## 2024-05-24 NOTE — ASSESSMENT & PLAN NOTE
Mild, may be related to atrophic vaginitis. Check UA  
Bethanechol helps her void.  She can hold her urine but wears some pads in case of urgency   
Check UA to assess for residual infection. If no growth.      She can consider supplements like cranberry or D-mannose  
She feels less stinging after using estradiol cream.    
24.6

## 2024-05-29 ENCOUNTER — TELEPHONE (OUTPATIENT)
Age: 89
End: 2024-05-29

## 2024-05-29 LAB — BACTERIA UR CULT: ABNORMAL

## 2024-05-29 RX ORDER — NITROFURANTOIN 25; 75 MG/1; MG/1
100 CAPSULE ORAL 2 TIMES DAILY
Qty: 20 CAPSULE | Refills: 0 | Status: SHIPPED | OUTPATIENT
Start: 2024-05-29 | End: 2024-06-08

## 2024-05-29 NOTE — TELEPHONE ENCOUNTER
Pt called to inquire about urine results from last visit and to see if she needs antibiotics. Culture only has preliminary result. When we have the full result in I can call and advise if you choose to send something in.

## 2024-05-29 NOTE — TELEPHONE ENCOUNTER
I left a message for her to take nitrofurantoin 100mg ibd x 10 days.     Her urine comes back with Citrobacter, resistant to several antibiotics.  She also has some drug allergies.    Follow up as scheduled.

## 2024-06-06 ENCOUNTER — TELEPHONE (OUTPATIENT)
Age: 89
End: 2024-06-06

## 2024-06-06 RX ORDER — PHENAZOPYRIDINE HYDROCHLORIDE 100 MG/1
100 TABLET, FILM COATED ORAL 3 TIMES DAILY PRN
Qty: 15 TABLET | Refills: 0 | Status: SHIPPED | OUTPATIENT
Start: 2024-06-06 | End: 2024-06-11

## 2024-06-06 NOTE — TELEPHONE ENCOUNTER
Pt called stating that she feels that the medication that was prescribed does not seem to be helping at all. She said that her symptoms aren't improving and she continues to have burning/stinging when urinating. Please advise.

## 2024-08-30 ENCOUNTER — OFFICE VISIT (OUTPATIENT)
Age: 89
End: 2024-08-30
Payer: MEDICARE

## 2024-08-30 VITALS
BODY MASS INDEX: 19.81 KG/M2 | DIASTOLIC BLOOD PRESSURE: 79 MMHG | HEART RATE: 87 BPM | HEIGHT: 64 IN | SYSTOLIC BLOOD PRESSURE: 127 MMHG | WEIGHT: 116 LBS

## 2024-08-30 DIAGNOSIS — N30.90 RECURRENT CYSTITIS: ICD-10-CM

## 2024-08-30 DIAGNOSIS — R33.9 INCOMPLETE EMPTYING OF BLADDER: ICD-10-CM

## 2024-08-30 DIAGNOSIS — N30.00 ACUTE CYSTITIS WITHOUT HEMATURIA: Primary | ICD-10-CM

## 2024-08-30 PROBLEM — R30.0 DYSURIA: Status: RESOLVED | Noted: 2024-05-24 | Resolved: 2024-08-30

## 2024-08-30 LAB
BILIRUBIN, URINE, POC: NEGATIVE
BLOOD URINE, POC: ABNORMAL
GLUCOSE URINE, POC: NEGATIVE
KETONES, URINE, POC: NEGATIVE
LEUKOCYTE ESTERASE, URINE, POC: ABNORMAL
NITRITE, URINE, POC: NEGATIVE
PH, URINE, POC: 6.5 (ref 4.6–8)
PROTEIN,URINE, POC: NEGATIVE
SPECIFIC GRAVITY, URINE, POC: 1.01 (ref 1–1.03)
URINALYSIS CLARITY, POC: CLEAR
URINALYSIS COLOR, POC: YELLOW
UROBILINOGEN, POC: ABNORMAL

## 2024-08-30 PROCEDURE — 1036F TOBACCO NON-USER: CPT | Performed by: UROLOGY

## 2024-08-30 PROCEDURE — 1090F PRES/ABSN URINE INCON ASSESS: CPT | Performed by: UROLOGY

## 2024-08-30 PROCEDURE — G8427 DOCREV CUR MEDS BY ELIG CLIN: HCPCS | Performed by: UROLOGY

## 2024-08-30 PROCEDURE — 51700 IRRIGATION OF BLADDER: CPT | Performed by: UROLOGY

## 2024-08-30 PROCEDURE — 1123F ACP DISCUSS/DSCN MKR DOCD: CPT | Performed by: UROLOGY

## 2024-08-30 PROCEDURE — G8420 CALC BMI NORM PARAMETERS: HCPCS | Performed by: UROLOGY

## 2024-08-30 PROCEDURE — 99214 OFFICE O/P EST MOD 30 MIN: CPT | Performed by: UROLOGY

## 2024-08-30 PROCEDURE — 81003 URINALYSIS AUTO W/O SCOPE: CPT | Performed by: UROLOGY

## 2024-08-30 RX ORDER — DOXYCYCLINE HYCLATE 100 MG
100 TABLET ORAL 2 TIMES DAILY
Qty: 14 TABLET | Refills: 0 | Status: SHIPPED | OUTPATIENT
Start: 2024-08-30 | End: 2024-09-06

## 2024-08-30 RX ORDER — ALPRAZOLAM 0.5 MG
0.5 TABLET ORAL 2 TIMES DAILY
COMMUNITY
Start: 2024-08-25

## 2024-08-30 NOTE — PROGRESS NOTES
cystitis with incomplete emptying.  Assessment & Plan:   Continue d-mannose and cranberry supplements.  Continue hydration.      Allergies   Allergen Reactions    Ciprofloxacin Hives    Seasonal Other (See Comments)     Mold, Mildew, Pollen, Soy---Congestion of nasal area    Sulfa Antibiotics      Other reaction(s): Unknown (comments)    Sulfur Hives    Thiazide-Type Diuretics Other (See Comments)    Amoxicillin Rash    Peanut (Diagnostic) Rash    Sulfur Dioxide Other (See Comments)      Prior to Admission medications    Medication Sig Start Date End Date Taking? Authorizing Provider   ALPRAZolam (XANAX) 0.5 MG tablet Take 1 tablet by mouth 2 times daily. 8/25/24  Yes Patience Peralta MD   doxycycline hyclate (VIBRA-TABS) 100 MG tablet Take 1 tablet by mouth 2 times daily for 7 days 8/30/24 9/6/24 Yes Armani Groves MD   estradiol (ESTRACE) 0.1 MG/GM vaginal cream 1g PV q 3-4 days. 5/14/24  Yes Armani Groves MD   budesonide-formoterol (SYMBICORT) 160-4.5 MCG/ACT AERO Inhale 2 puffs into the lungs 2 times daily 2/16/24  Yes Mike Cardenas MD   albuterol (PROVENTIL) (2.5 MG/3ML) 0.083% nebulizer solution Take 3 mLs by nebulization 4 times daily as needed for Wheezing 2/16/24  Yes Mike Cardenas MD   apixaban (ELIQUIS) 5 MG TABS tablet Take 1 tablet by mouth 2 times daily   Yes Patience Peralta MD   bethanechol (URECHOLINE) 10 MG tablet Take 1 tablet by mouth 4 times daily 1/17/24 1/16/25 Yes Armani Groves MD   METAMUCIL SMOOTH TEXTURE 58.6 % powder USE 1 SCOOP AS DIRECTED ONCE DAILY 7/19/23  Yes Patience Peralta MD   mupirocin (BACTROBAN) 2 % ointment APPLY OINTMENT ONCE DAILY TO AFFECTED AREA ON LEFT LOWER LEG 10/19/23  Yes Patience Peralta MD   hydrocortisone (ANUSOL-HC) 2.5 % CREA rectal cream USE AS DIRECTED TWICE A DAY 10/17/23  Yes Patience Peralta MD   fluocinonide (LIDEX) 0.05 % gel APPLY AS DIRECTED ONCE DAILY TO LEFT LOWER LEG 10/13/23  Yes Patience Peralta MD   azelastine  (ASTELIN) 0.1 % nasal spray 1 spray 2 times daily   Yes Automatic Reconciliation, Ar   levothyroxine (SYNTHROID) 88 MCG tablet Take 1 tablet by mouth every morning 4/25/23  Yes Patience Peralta MD   lisinopril (PRINIVIL;ZESTRIL) 40 MG tablet Take 1 tablet by mouth daily 9/5/20  Yes Patience Peralta MD   metoprolol succinate (TOPROL XL) 25 MG extended release tablet Take 2 tablets by mouth daily 2/14/23  Yes Patience Peralta MD   ASPIRIN 81 PO Take 81 mg by mouth   Yes Provider, MD Patience   atorvastatin (LIPITOR) 20 MG tablet Take 1 tablet by mouth 3/21/22  Yes Provider, MD Patience     Past Medical History:  PMHx (including negatives):  has a past medical history of Acquired absence of both cervix and uterus, Atrial fibrillation (HCC), Atrophy of vulva, Generalized anxiety disorder, Hx of bone density study, Hypertension, Postmenopausal atrophic vaginitis, Symptomatic menopausal or female climacteric states, Unspecified asthma(493.90), and Unspecified hypothyroidism.   PSurgHx:  has a past surgical history that includes Thyroidectomy, partial; Hysterectomy, total abdominal (1970); Tonsillectomy; Urological Surgery; and other surgical history.  PSocHx:  reports that she has never smoked. She has never used smokeless tobacco. She reports that she does not currently use alcohol. She reports that she does not use drugs.   FamilyHX:   Family History   Problem Relation Age of Onset    Cancer Father         Prostate       Review of Systems   All other systems reviewed and are negative.      Physical Exam  Constitutional:       General: She is not in acute distress.     Appearance: She is not ill-appearing.   HENT:      Head: Normocephalic and atraumatic.   Eyes:      Extraocular Movements: Extraocular movements intact.      Pupils: Pupils are equal, round, and reactive to light.   Pulmonary:      Effort: Pulmonary effort is normal.   Genitourinary:     General: Normal vulva.      Pubic Area: No rash.

## 2024-08-30 NOTE — ASSESSMENT & PLAN NOTE
Chronic incomplete emptying of the bladder.  Continue bethanechol.  She is emptying a little better.  Bladder catheterized and irrigated.

## 2024-08-30 NOTE — PROGRESS NOTES
Chief Complaint   Patient presents with    Follow-up     Dysuria     1. Have you been to the ER, urgent care clinic since your last visit?  Hospitalized since your last visit?No    2. Have you seen or consulted any other health care providers outside of the [facility] since your last visit?  Include any pap smears or colon screening. No  /79   Pulse 87   Ht 1.626 m (5' 4\")   Wt 52.6 kg (116 lb)   BMI 19.91 kg/m²

## 2024-08-31 LAB
APPEARANCE UR: CLEAR
BACTERIA #/AREA URNS HPF: ABNORMAL /[HPF]
BILIRUB UR QL STRIP: NEGATIVE
CASTS URNS QL MICRO: ABNORMAL /LPF
COLOR UR: YELLOW
EPI CELLS #/AREA URNS HPF: ABNORMAL /HPF (ref 0–10)
GLUCOSE UR QL STRIP: NEGATIVE
HGB UR QL STRIP: NEGATIVE
KETONES UR QL STRIP: NEGATIVE
LEUKOCYTE ESTERASE UR QL STRIP: ABNORMAL
MICRO URNS: ABNORMAL
NITRITE UR QL STRIP: NEGATIVE
PH UR STRIP: 6.5 [PH] (ref 5–7.5)
PROT UR QL STRIP: NEGATIVE
RBC #/AREA URNS HPF: ABNORMAL /HPF (ref 0–2)
SP GR UR STRIP: 1.01 (ref 1–1.03)
UROBILINOGEN UR STRIP-MCNC: 0.2 MG/DL (ref 0.2–1)
WBC #/AREA URNS HPF: ABNORMAL /HPF (ref 0–5)

## 2024-09-02 LAB — BACTERIA UR CULT: NORMAL

## 2024-09-13 ENCOUNTER — HOSPITAL ENCOUNTER (EMERGENCY)
Facility: HOSPITAL | Age: 89
Discharge: HOME | End: 2024-09-13
Attending: STUDENT IN AN ORGANIZED HEALTH CARE EDUCATION/TRAINING PROGRAM
Payer: MEDICARE

## 2024-09-13 VITALS
HEART RATE: 79 BPM | DIASTOLIC BLOOD PRESSURE: 76 MMHG | TEMPERATURE: 97.5 F | RESPIRATION RATE: 16 BRPM | BODY MASS INDEX: 20.14 KG/M2 | OXYGEN SATURATION: 96 % | WEIGHT: 118 LBS | SYSTOLIC BLOOD PRESSURE: 146 MMHG | HEIGHT: 64 IN

## 2024-09-13 DIAGNOSIS — R30.0 DYSURIA: Primary | ICD-10-CM

## 2024-09-13 LAB
APPEARANCE UR: ABNORMAL
BACTERIA URNS QL MICRO: ABNORMAL /HPF
BILIRUB UR QL: NEGATIVE
COLOR UR: ABNORMAL
EPITH CASTS URNS QL MICRO: ABNORMAL /LPF
GLUCOSE UR STRIP.AUTO-MCNC: NEGATIVE MG/DL
HGB UR QL STRIP: ABNORMAL
KETONES UR QL STRIP.AUTO: NEGATIVE MG/DL
LEUKOCYTE ESTERASE UR QL STRIP.AUTO: ABNORMAL
NITRITE UR QL STRIP.AUTO: POSITIVE
PH UR STRIP: 7 (ref 5–8)
PROT UR STRIP-MCNC: ABNORMAL MG/DL
RBC #/AREA URNS HPF: ABNORMAL /HPF
SP GR UR REFRACTOMETRY: 1.01 (ref 1–1.03)
URINE CULTURE IF INDICATED: ABNORMAL
UROBILINOGEN UR QL STRIP.AUTO: 1 EU/DL (ref 0.2–1)
WBC URNS QL MICRO: >100 /HPF (ref 0–4)

## 2024-09-13 PROCEDURE — 99283 EMERGENCY DEPT VISIT LOW MDM: CPT

## 2024-09-13 PROCEDURE — 81001 URINALYSIS AUTO W/SCOPE: CPT

## 2024-09-13 PROCEDURE — 87086 URINE CULTURE/COLONY COUNT: CPT

## 2024-09-13 RX ORDER — NITROFURANTOIN 25; 75 MG/1; MG/1
100 CAPSULE ORAL 2 TIMES DAILY
Qty: 20 CAPSULE | Refills: 0 | Status: SHIPPED | OUTPATIENT
Start: 2024-09-13 | End: 2024-09-23

## 2024-09-13 ASSESSMENT — PAIN SCALES - GENERAL: PAINLEVEL_OUTOF10: 3

## 2024-09-15 LAB
BACTERIA SPEC CULT: ABNORMAL
CC UR VC: ABNORMAL
SERVICE CMNT-IMP: ABNORMAL

## 2024-09-15 RX ORDER — CEFUROXIME AXETIL 500 MG/1
500 TABLET ORAL 2 TIMES DAILY
Qty: 14 TABLET | Refills: 0 | Status: SHIPPED | OUTPATIENT
Start: 2024-09-15 | End: 2024-09-22

## 2024-09-18 ENCOUNTER — OFFICE VISIT (OUTPATIENT)
Age: 89
End: 2024-09-18
Payer: MEDICARE

## 2024-09-18 VITALS
HEIGHT: 64 IN | DIASTOLIC BLOOD PRESSURE: 47 MMHG | SYSTOLIC BLOOD PRESSURE: 115 MMHG | BODY MASS INDEX: 20.14 KG/M2 | OXYGEN SATURATION: 90 % | WEIGHT: 118 LBS | HEART RATE: 61 BPM

## 2024-09-18 DIAGNOSIS — B37.9 MONILIASIS: ICD-10-CM

## 2024-09-18 DIAGNOSIS — N39.41 URGE INCONTINENCE OF URINE: ICD-10-CM

## 2024-09-18 DIAGNOSIS — N30.90 RECURRENT CYSTITIS: Primary | ICD-10-CM

## 2024-09-18 DIAGNOSIS — R33.9 INCOMPLETE EMPTYING OF BLADDER: ICD-10-CM

## 2024-09-18 PROCEDURE — 1123F ACP DISCUSS/DSCN MKR DOCD: CPT | Performed by: UROLOGY

## 2024-09-18 PROCEDURE — 99214 OFFICE O/P EST MOD 30 MIN: CPT | Performed by: UROLOGY

## 2024-09-18 PROCEDURE — G8427 DOCREV CUR MEDS BY ELIG CLIN: HCPCS | Performed by: UROLOGY

## 2024-09-18 PROCEDURE — G8420 CALC BMI NORM PARAMETERS: HCPCS | Performed by: UROLOGY

## 2024-09-18 PROCEDURE — 1090F PRES/ABSN URINE INCON ASSESS: CPT | Performed by: UROLOGY

## 2024-09-18 PROCEDURE — 1036F TOBACCO NON-USER: CPT | Performed by: UROLOGY

## 2024-09-18 PROCEDURE — 0509F URINE INCON PLAN DOCD: CPT | Performed by: UROLOGY

## 2024-09-18 RX ORDER — FLUCONAZOLE 150 MG/1
150 TABLET ORAL ONCE
Qty: 1 TABLET | Refills: 2 | Status: SHIPPED | OUTPATIENT
Start: 2024-09-18 | End: 2024-09-18

## 2024-09-18 RX ORDER — CEPHALEXIN 500 MG/1
500 CAPSULE ORAL DAILY
Qty: 90 CAPSULE | Refills: 3 | Status: SHIPPED | OUTPATIENT
Start: 2024-09-18

## 2024-09-30 ENCOUNTER — TELEPHONE (OUTPATIENT)
Age: 89
End: 2024-09-30

## 2024-09-30 NOTE — TELEPHONE ENCOUNTER
Pt Called Stating She Has Been Taking cephALEXin (KEFLEX) 500 MG capsule and It doesn't seem to be helping with her infection. She would like to know what should she do?

## 2024-10-02 ENCOUNTER — OFFICE VISIT (OUTPATIENT)
Age: 89
End: 2024-10-02
Payer: MEDICARE

## 2024-10-02 VITALS
HEIGHT: 64 IN | DIASTOLIC BLOOD PRESSURE: 82 MMHG | WEIGHT: 116 LBS | HEART RATE: 75 BPM | BODY MASS INDEX: 19.81 KG/M2 | SYSTOLIC BLOOD PRESSURE: 167 MMHG

## 2024-10-02 DIAGNOSIS — R33.9 INCOMPLETE EMPTYING OF BLADDER: ICD-10-CM

## 2024-10-02 DIAGNOSIS — N30.90 RECURRENT CYSTITIS: Primary | ICD-10-CM

## 2024-10-02 LAB
BILIRUBIN, URINE, POC: NORMAL
BLOOD URINE, POC: NORMAL
GLUCOSE URINE, POC: NEGATIVE
KETONES, URINE, POC: NEGATIVE
LEUKOCYTE ESTERASE, URINE, POC: NORMAL
NITRITE, URINE, POC: POSITIVE
PH, URINE, POC: 5.5 (ref 4.6–8)
PROTEIN,URINE, POC: NEGATIVE
SPECIFIC GRAVITY, URINE, POC: 1.01 (ref 1–1.03)
URINALYSIS CLARITY, POC: NORMAL
URINALYSIS COLOR, POC: YELLOW
UROBILINOGEN, POC: NORMAL

## 2024-10-02 PROCEDURE — 99214 OFFICE O/P EST MOD 30 MIN: CPT | Performed by: UROLOGY

## 2024-10-02 PROCEDURE — 81003 URINALYSIS AUTO W/O SCOPE: CPT | Performed by: UROLOGY

## 2024-10-02 PROCEDURE — 1123F ACP DISCUSS/DSCN MKR DOCD: CPT | Performed by: UROLOGY

## 2024-10-02 PROCEDURE — 1036F TOBACCO NON-USER: CPT | Performed by: UROLOGY

## 2024-10-02 PROCEDURE — G8420 CALC BMI NORM PARAMETERS: HCPCS | Performed by: UROLOGY

## 2024-10-02 PROCEDURE — 1090F PRES/ABSN URINE INCON ASSESS: CPT | Performed by: UROLOGY

## 2024-10-02 PROCEDURE — G8484 FLU IMMUNIZE NO ADMIN: HCPCS | Performed by: UROLOGY

## 2024-10-02 PROCEDURE — 51700 IRRIGATION OF BLADDER: CPT | Performed by: UROLOGY

## 2024-10-02 PROCEDURE — G8427 DOCREV CUR MEDS BY ELIG CLIN: HCPCS | Performed by: UROLOGY

## 2024-10-02 RX ORDER — DOXYCYCLINE HYCLATE 100 MG
100 TABLET ORAL 2 TIMES DAILY
Qty: 28 TABLET | Refills: 0 | Status: SHIPPED | OUTPATIENT
Start: 2024-10-02 | End: 2024-10-16

## 2024-10-02 RX ORDER — METHENAMINE HIPPURATE 1000 MG/1
1 TABLET ORAL 2 TIMES DAILY WITH MEALS
Qty: 60 TABLET | Refills: 3 | Status: SHIPPED | OUTPATIENT
Start: 2024-10-02

## 2024-10-02 NOTE — ASSESSMENT & PLAN NOTE
Her bladder was drained with a catheter and irrigated today.  She will continue bethanechol and hydration.

## 2024-10-02 NOTE — ASSESSMENT & PLAN NOTE
She has recurrent cystitis.  Suppressive cephalexin does not seem to be controlling her symptoms.  Cath urine sent for culture.  I will treat her with a 2-week course of doxycycline Karen will then resume her suppressive cephalexin.    We also discussed adding methenamine 1 g twice a day to try to help prevent infections.

## 2024-10-02 NOTE — PROGRESS NOTES
Chief Complaint   Patient presents with    Follow-up     C/O of stinging and burning while urinating     1. Have you been to the ER, urgent care clinic since your last visit?  Hospitalized since your last visit?No    2. Have you seen or consulted any other health care providers outside of the [facility] since your last visit?  Include any pap smears or colon screening. No  BP (!) 167/82 (Site: Right Upper Arm, Position: Sitting, Cuff Size: Medium Adult)   Pulse 75   Ht 1.626 m (5' 4\")   Wt 52.6 kg (116 lb)   BMI 19.91 kg/m²     
tablet; Take 1 tablet by mouth 2 times daily for 14 days, Disp-28 tablet, R-0Normal  2. Incomplete emptying of bladder  Assessment & Plan:  Her bladder was drained with a catheter and irrigated today.  She will continue bethanechol and hydration.      Return in about 1 month (around 11/2/2024).   Armani Groves MD       Please note that portions of this note was potentially completed with Dragon dictation, the computer voice recognition software.  Quite often unanticipated grammatical, syntax, homophones, and other interpretive errors are inadvertently transcribed by the computer software.  Please disregard these errors.  Please excuse any errors that have escaped final proofreading.  Thank you.

## 2024-10-04 ENCOUNTER — TELEPHONE (OUTPATIENT)
Age: 89
End: 2024-10-04

## 2024-10-04 NOTE — TELEPHONE ENCOUNTER
Without knowing exactly what she bought, that is tough to answer.  She should bring her bottle in from [facility].      Methenamine is $20-30 with [facility] prices too if she can afford that.

## 2024-10-04 NOTE — TELEPHONE ENCOUNTER
You are looking for a PAC (proanthocyanidins) at least 30 mg.  I provided the patient with this information.    She is concerned about warnings for Eliquis and methenamine.  There is no listed contraindication that I am aware of; UpToDate does not list any either.    She is told that if she develops any unusual symptoms or more bleeding to let us know and stop taking the medications.

## 2024-10-04 NOTE — TELEPHONE ENCOUNTER
Called patient back, there was a mix up in the medications. She stated that it wasn't something sent in it was for over the counter cranberry supplement (Theracran).

## 2024-10-04 NOTE — TELEPHONE ENCOUNTER
Patient called stating that the medication that Dr. Groves had recently prescribed (Methenamine) is very pricey but she was able to find the same medication and strength on [employer] for a cheaper price. She would like to know if Dr. Groves want her to take the brand that he prescribed or would it be ok to use a different brand?

## 2024-10-05 LAB — BACTERIA UR CULT: ABNORMAL

## 2024-11-06 ENCOUNTER — OFFICE VISIT (OUTPATIENT)
Age: 89
End: 2024-11-06
Payer: MEDICARE

## 2024-11-06 VITALS
HEART RATE: 78 BPM | WEIGHT: 116 LBS | DIASTOLIC BLOOD PRESSURE: 86 MMHG | HEIGHT: 64 IN | SYSTOLIC BLOOD PRESSURE: 165 MMHG | BODY MASS INDEX: 19.81 KG/M2

## 2024-11-06 DIAGNOSIS — N30.90 RECURRENT CYSTITIS: Primary | ICD-10-CM

## 2024-11-06 DIAGNOSIS — R35.1 NOCTURIA: ICD-10-CM

## 2024-11-06 DIAGNOSIS — N95.2 ATROPHIC VAGINITIS: ICD-10-CM

## 2024-11-06 DIAGNOSIS — R33.9 INCOMPLETE EMPTYING OF BLADDER: ICD-10-CM

## 2024-11-06 LAB
BILIRUBIN, URINE, POC: NEGATIVE
BLOOD URINE, POC: NORMAL
GLUCOSE URINE, POC: NEGATIVE
KETONES, URINE, POC: NEGATIVE
LEUKOCYTE ESTERASE, URINE, POC: NEGATIVE
NITRITE, URINE, POC: NEGATIVE
PH, URINE, POC: 6 (ref 4.6–8)
PROTEIN,URINE, POC: NEGATIVE
SPECIFIC GRAVITY, URINE, POC: 1.01 (ref 1–1.03)
URINALYSIS CLARITY, POC: CLEAR
URINALYSIS COLOR, POC: YELLOW
UROBILINOGEN, POC: NORMAL

## 2024-11-06 PROCEDURE — 1036F TOBACCO NON-USER: CPT | Performed by: UROLOGY

## 2024-11-06 PROCEDURE — 1123F ACP DISCUSS/DSCN MKR DOCD: CPT | Performed by: UROLOGY

## 2024-11-06 PROCEDURE — G8484 FLU IMMUNIZE NO ADMIN: HCPCS | Performed by: UROLOGY

## 2024-11-06 PROCEDURE — 1090F PRES/ABSN URINE INCON ASSESS: CPT | Performed by: UROLOGY

## 2024-11-06 PROCEDURE — 99214 OFFICE O/P EST MOD 30 MIN: CPT | Performed by: UROLOGY

## 2024-11-06 PROCEDURE — 81003 URINALYSIS AUTO W/O SCOPE: CPT | Performed by: UROLOGY

## 2024-11-06 PROCEDURE — G8427 DOCREV CUR MEDS BY ELIG CLIN: HCPCS | Performed by: UROLOGY

## 2024-11-06 PROCEDURE — 1126F AMNT PAIN NOTED NONE PRSNT: CPT | Performed by: UROLOGY

## 2024-11-06 PROCEDURE — 1159F MED LIST DOCD IN RCRD: CPT | Performed by: UROLOGY

## 2024-11-06 PROCEDURE — G8420 CALC BMI NORM PARAMETERS: HCPCS | Performed by: UROLOGY

## 2024-11-06 ASSESSMENT — ENCOUNTER SYMPTOMS
COUGH: 0
CONSTIPATION: 1
SHORTNESS OF BREATH: 0

## 2024-11-06 NOTE — PROGRESS NOTES
Chief Complaint   Patient presents with    Follow-up       1. Have you been to the ER, urgent care clinic since your last visit?  Hospitalized since your last visit?No    2. Have you seen or consulted any other health care providers outside of the [facility] since your last visit?  Include any pap smears or colon screening. No  BP (!) 165/86 (Site: Left Upper Arm, Position: Sitting, Cuff Size: Medium Adult)   Pulse 78   Ht 1.626 m (5' 4\")   Wt 52.6 kg (116 lb)   BMI 19.91 kg/m²     
Yes Provider, MD Patience   azelastine (ASTELIN) 0.1 % nasal spray 1 spray 2 times daily   Yes Automatic Reconciliation, Ar   levothyroxine (SYNTHROID) 88 MCG tablet Take 1 tablet by mouth every morning 4/25/23  Yes ProviderPatience MD   lisinopril (PRINIVIL;ZESTRIL) 40 MG tablet Take 1 tablet by mouth daily 9/5/20  Yes ProviderPatience MD   metoprolol succinate (TOPROL XL) 25 MG extended release tablet Take 2 tablets by mouth daily 2/14/23  Yes ProviderPatience MD   ASPIRIN 81 PO Take 81 mg by mouth   Yes ProviderPatience MD   atorvastatin (LIPITOR) 20 MG tablet Take 1 tablet by mouth 3/21/22  Yes Provider, MD Patience     Past Medical History:  PMHx (including negatives):  has a past medical history of Acquired absence of both cervix and uterus, Atrial fibrillation (HCC), Atrophy of vulva, Generalized anxiety disorder, Hx of bone density study, Hypertension, Postmenopausal atrophic vaginitis, Symptomatic menopausal or female climacteric states, Unspecified asthma(493.90), and Unspecified hypothyroidism.   PSurgHx:  has a past surgical history that includes Thyroidectomy, partial; Hysterectomy, total abdominal (1970); Tonsillectomy; Urological Surgery; and other surgical history.  PSocHx:  reports that she has never smoked. She has never used smokeless tobacco. She reports that she does not currently use alcohol. She reports that she does not use drugs.   FamilyHX:   Family History   Problem Relation Age of Onset    Cancer Father         Prostate       Review of Systems   Respiratory:  Negative for cough and shortness of breath.    Gastrointestinal:  Positive for constipation.       Physical Exam  Constitutional:       General: She is not in acute distress.            ASSESSMENT and PLAN  1. Recurrent cystitis  Assessment & Plan:  Doing ok with methenamine and timed voiding.  She is also on doxycycline currently for a URI which would help with a UTI.    Orders:  -     AMB POC URINALYSIS

## 2024-11-06 NOTE — ASSESSMENT & PLAN NOTE
Doing ok with methenamine and timed voiding.  She is also on doxycycline currently for a URI which would help with a UTI.

## 2024-11-07 LAB
APPEARANCE UR: CLEAR
BACTERIA #/AREA URNS HPF: NORMAL /[HPF]
BILIRUB UR QL STRIP: NEGATIVE
CASTS URNS QL MICRO: NORMAL /LPF
COLOR UR: YELLOW
EPI CELLS #/AREA URNS HPF: NORMAL /HPF (ref 0–10)
GLUCOSE UR QL STRIP: NEGATIVE
HGB UR QL STRIP: NEGATIVE
KETONES UR QL STRIP: NEGATIVE
LEUKOCYTE ESTERASE UR QL STRIP: ABNORMAL
MICRO URNS: ABNORMAL
NITRITE UR QL STRIP: NEGATIVE
PH UR STRIP: 6 [PH] (ref 5–7.5)
PROT UR QL STRIP: NEGATIVE
RBC #/AREA URNS HPF: NORMAL /HPF (ref 0–2)
SP GR UR STRIP: 1.01 (ref 1–1.03)
UROBILINOGEN UR STRIP-MCNC: 0.2 MG/DL (ref 0.2–1)
WBC #/AREA URNS HPF: NORMAL /HPF (ref 0–5)

## 2024-11-25 ENCOUNTER — APPOINTMENT (OUTPATIENT)
Facility: HOSPITAL | Age: 88
End: 2024-11-25
Payer: MEDICARE

## 2024-11-25 ENCOUNTER — HOSPITAL ENCOUNTER (EMERGENCY)
Facility: HOSPITAL | Age: 88
Discharge: HOME | End: 2024-11-25
Attending: STUDENT IN AN ORGANIZED HEALTH CARE EDUCATION/TRAINING PROGRAM
Payer: MEDICARE

## 2024-11-25 VITALS
HEART RATE: 95 BPM | HEIGHT: 63 IN | OXYGEN SATURATION: 93 % | RESPIRATION RATE: 21 BRPM | WEIGHT: 118.17 LBS | SYSTOLIC BLOOD PRESSURE: 157 MMHG | DIASTOLIC BLOOD PRESSURE: 83 MMHG | BODY MASS INDEX: 20.94 KG/M2 | TEMPERATURE: 97.6 F

## 2024-11-25 DIAGNOSIS — J44.1 COPD EXACERBATION (HCC): Primary | ICD-10-CM

## 2024-11-25 LAB
ALBUMIN SERPL-MCNC: 4 G/DL (ref 3.5–5)
ALBUMIN/GLOB SERPL: 1.3 (ref 1.1–2.2)
ALP SERPL-CCNC: 86 U/L (ref 45–117)
ALT SERPL-CCNC: 21 U/L (ref 12–78)
ANION GAP SERPL CALC-SCNC: 7 MMOL/L (ref 2–12)
AST SERPL-CCNC: 24 U/L (ref 15–37)
BASOPHILS # BLD: 0.1 K/UL (ref 0–0.1)
BASOPHILS NFR BLD: 1 % (ref 0–1)
BILIRUB SERPL-MCNC: 0.8 MG/DL (ref 0.2–1)
BUN SERPL-MCNC: 15 MG/DL (ref 6–20)
BUN/CREAT SERPL: 22 (ref 12–20)
CALCIUM SERPL-MCNC: 9.2 MG/DL (ref 8.5–10.1)
CHLORIDE SERPL-SCNC: 104 MMOL/L (ref 97–108)
CO2 SERPL-SCNC: 27 MMOL/L (ref 21–32)
CREAT SERPL-MCNC: 0.69 MG/DL (ref 0.55–1.02)
DIFFERENTIAL METHOD BLD: NORMAL
EKG DIAGNOSIS: NORMAL
EKG Q-T INTERVAL: 320 MS
EKG QRS DURATION: 72 MS
EKG QTC CALCULATION (BAZETT): 391 MS
EKG R AXIS: -26 DEGREES
EKG T AXIS: 104 DEGREES
EKG VENTRICULAR RATE: 90 BPM
EOSINOPHIL # BLD: 0.3 K/UL (ref 0–0.4)
EOSINOPHIL NFR BLD: 4 % (ref 0–7)
ERYTHROCYTE [DISTWIDTH] IN BLOOD BY AUTOMATED COUNT: 13.9 % (ref 11.5–14.5)
GLOBULIN SER CALC-MCNC: 3.1 G/DL (ref 2–4)
GLUCOSE SERPL-MCNC: 101 MG/DL (ref 65–100)
HCT VFR BLD AUTO: 41.3 % (ref 35–47)
HGB BLD-MCNC: 13.7 G/DL (ref 11.5–16)
IMM GRANULOCYTES # BLD AUTO: 0 K/UL (ref 0–0.04)
IMM GRANULOCYTES NFR BLD AUTO: 0 % (ref 0–0.5)
LYMPHOCYTES # BLD: 2.2 K/UL (ref 0.8–3.5)
LYMPHOCYTES NFR BLD: 33 % (ref 12–49)
MCH RBC QN AUTO: 31.3 PG (ref 26–34)
MCHC RBC AUTO-ENTMCNC: 33.2 G/DL (ref 30–36.5)
MCV RBC AUTO: 94.3 FL (ref 80–99)
MONOCYTES # BLD: 0.6 K/UL (ref 0–1)
MONOCYTES NFR BLD: 9 % (ref 5–13)
NEUTS SEG # BLD: 3.7 K/UL (ref 1.8–8)
NEUTS SEG NFR BLD: 53 % (ref 32–75)
NRBC # BLD: 0 K/UL (ref 0–0.01)
NRBC BLD-RTO: 0 PER 100 WBC
PLATELET # BLD AUTO: 179 K/UL (ref 150–400)
PMV BLD AUTO: 10.3 FL (ref 8.9–12.9)
POTASSIUM SERPL-SCNC: 3.5 MMOL/L (ref 3.5–5.1)
PROT SERPL-MCNC: 7.1 G/DL (ref 6.4–8.2)
RBC # BLD AUTO: 4.38 M/UL (ref 3.8–5.2)
SODIUM SERPL-SCNC: 138 MMOL/L (ref 136–145)
TROPONIN I SERPL HS-MCNC: 7 NG/L (ref 0–51)
TROPONIN I SERPL HS-MCNC: 7 NG/L (ref 0–51)
WBC # BLD AUTO: 6.9 K/UL (ref 3.6–11)

## 2024-11-25 PROCEDURE — 93010 ELECTROCARDIOGRAM REPORT: CPT | Performed by: SPECIALIST

## 2024-11-25 PROCEDURE — 6370000000 HC RX 637 (ALT 250 FOR IP): Performed by: STUDENT IN AN ORGANIZED HEALTH CARE EDUCATION/TRAINING PROGRAM

## 2024-11-25 PROCEDURE — 85025 COMPLETE CBC W/AUTO DIFF WBC: CPT

## 2024-11-25 PROCEDURE — 36415 COLL VENOUS BLD VENIPUNCTURE: CPT

## 2024-11-25 PROCEDURE — 71045 X-RAY EXAM CHEST 1 VIEW: CPT

## 2024-11-25 PROCEDURE — 99285 EMERGENCY DEPT VISIT HI MDM: CPT

## 2024-11-25 PROCEDURE — 94640 AIRWAY INHALATION TREATMENT: CPT

## 2024-11-25 PROCEDURE — 93005 ELECTROCARDIOGRAM TRACING: CPT | Performed by: STUDENT IN AN ORGANIZED HEALTH CARE EDUCATION/TRAINING PROGRAM

## 2024-11-25 PROCEDURE — 6360000002 HC RX W HCPCS: Performed by: STUDENT IN AN ORGANIZED HEALTH CARE EDUCATION/TRAINING PROGRAM

## 2024-11-25 PROCEDURE — 80053 COMPREHEN METABOLIC PANEL: CPT

## 2024-11-25 PROCEDURE — 2580000003 HC RX 258: Performed by: STUDENT IN AN ORGANIZED HEALTH CARE EDUCATION/TRAINING PROGRAM

## 2024-11-25 PROCEDURE — 94761 N-INVAS EAR/PLS OXIMETRY MLT: CPT

## 2024-11-25 PROCEDURE — 84484 ASSAY OF TROPONIN QUANT: CPT

## 2024-11-25 PROCEDURE — 96374 THER/PROPH/DIAG INJ IV PUSH: CPT

## 2024-11-25 RX ORDER — IPRATROPIUM BROMIDE AND ALBUTEROL SULFATE 2.5; .5 MG/3ML; MG/3ML
1 SOLUTION RESPIRATORY (INHALATION)
Status: COMPLETED | OUTPATIENT
Start: 2024-11-25 | End: 2024-11-25

## 2024-11-25 RX ORDER — ALBUTEROL SULFATE 90 UG/1
2 INHALANT RESPIRATORY (INHALATION) 4 TIMES DAILY PRN
Qty: 18 G | Refills: 0 | Status: SHIPPED | OUTPATIENT
Start: 2024-11-25

## 2024-11-25 RX ORDER — PREDNISONE 20 MG/1
40 TABLET ORAL DAILY
Qty: 8 TABLET | Refills: 0 | Status: SHIPPED | OUTPATIENT
Start: 2024-11-25 | End: 2024-11-29

## 2024-11-25 RX ADMIN — WATER 125 MG: 1 INJECTION INTRAMUSCULAR; INTRAVENOUS; SUBCUTANEOUS at 05:44

## 2024-11-25 RX ADMIN — IPRATROPIUM BROMIDE AND ALBUTEROL SULFATE 1 DOSE: 2.5; .5 SOLUTION RESPIRATORY (INHALATION) at 05:41

## 2024-11-25 ASSESSMENT — PAIN DESCRIPTION - LOCATION: LOCATION: CHEST

## 2024-11-25 ASSESSMENT — HEART SCORE: ECG: NON-SPECIFC REPOLARIZATION DISTURBANCE/LBTB/PM

## 2024-11-25 ASSESSMENT — PAIN DESCRIPTION - DESCRIPTORS: DESCRIPTORS: TIGHTNESS

## 2024-11-25 ASSESSMENT — PAIN - FUNCTIONAL ASSESSMENT: PAIN_FUNCTIONAL_ASSESSMENT: 0-10

## 2024-11-25 ASSESSMENT — PAIN SCALES - GENERAL: PAINLEVEL_OUTOF10: 1

## 2024-11-25 NOTE — ED TRIAGE NOTES
EMS reports patient has been experiencing SOB since last night. Pt has hx of asthma, afib, HTN.     EMS reports 92% on RA  Pt ran out of inhaler yesterday  EMS given one neb treatment    Patient 98% on RA currently  Patient reports tightness in chest rates 1/10  Patient denies shortness of breath after neb treatment    Pt coming from [facility]

## 2024-11-25 NOTE — ED PROVIDER NOTES
radiologist.    Interpretation per the Radiologist below, if available at the time of this note:    XR CHEST PORTABLE   Final Result      No acute process.         Electronically signed by Rafi Alba           LABS:  Labs Reviewed   COMPREHENSIVE METABOLIC PANEL - Abnormal; Notable for the following components:       Result Value    Glucose 101 (*)     BUN/Creatinine Ratio 22 (*)     All other components within normal limits   CBC WITH AUTO DIFFERENTIAL   TROPONIN   TROPONIN       All other labs were within normal range or not returned as of this dictation.        PROCEDURES:  Unless otherwise noted below, none     Procedures    See MDM for documentation of critical care time.       FINAL IMPRESSION      1. COPD exacerbation (HCC)          DISPOSITION/PLAN   DISPOSITION        PATIENT REFERRED TO:  Jessie Lord MD  [address] [facility]   [facility] [place] [facility] [address] [phone]    Call in 1 day  Regarding your ER visit today and for recheck    Emergency Department    Go to   If symptoms worsen      DISCHARGE MEDICATIONS:  New Prescriptions    ALBUTEROL SULFATE HFA (VENTOLIN HFA) 108 (90 BASE) MCG/ACT INHALER    Inhale 2 puffs into the lungs 4 times daily as needed for Wheezing    PREDNISONE (DELTASONE) 20 MG TABLET    Take 2 tablets by mouth daily for 4 days Start taking this tomorrow         (Please note that portions of this note were completed with a voice recognition program.  Efforts were made to edit the dictations but occasionally words are mis-transcribed.)    Angi Webb DO (electronically signed)  Emergency Attending Physician / Physician Assistant / Nurse Practitioner             Angi Webb DO  11/25/24 0704       Angi Webb DO  11/25/24 0740

## 2024-12-07 ENCOUNTER — APPOINTMENT (OUTPATIENT)
Facility: HOSPITAL | Age: 88
End: 2024-12-07
Payer: MEDICARE

## 2024-12-07 ENCOUNTER — HOSPITAL ENCOUNTER (EMERGENCY)
Facility: HOSPITAL | Age: 88
Discharge: HOME | End: 2024-12-07
Attending: STUDENT IN AN ORGANIZED HEALTH CARE EDUCATION/TRAINING PROGRAM
Payer: MEDICARE

## 2024-12-07 VITALS
BODY MASS INDEX: 19.67 KG/M2 | RESPIRATION RATE: 23 BRPM | DIASTOLIC BLOOD PRESSURE: 79 MMHG | HEART RATE: 100 BPM | SYSTOLIC BLOOD PRESSURE: 150 MMHG | HEIGHT: 63 IN | WEIGHT: 111 LBS | OXYGEN SATURATION: 94 % | TEMPERATURE: 97.8 F

## 2024-12-07 DIAGNOSIS — J44.1 COPD EXACERBATION (HCC): Primary | ICD-10-CM

## 2024-12-07 LAB
ALBUMIN SERPL-MCNC: 3.9 G/DL (ref 3.5–5.2)
ALBUMIN/GLOB SERPL: 1.6 (ref 1.1–2.2)
ALP SERPL-CCNC: 86 U/L (ref 35–104)
ALT SERPL-CCNC: 12 U/L (ref 10–35)
ANION GAP SERPL CALC-SCNC: 8 MMOL/L (ref 2–12)
AST SERPL-CCNC: 22 U/L (ref 10–35)
BASOPHILS # BLD: 0 K/UL (ref 0–1)
BASOPHILS NFR BLD: 0 % (ref 0–1)
BILIRUB SERPL-MCNC: 0.4 MG/DL (ref 0.2–1)
BUN SERPL-MCNC: 21 MG/DL (ref 8–23)
BUN/CREAT SERPL: 29 (ref 12–20)
CALCIUM SERPL-MCNC: 9 MG/DL (ref 8.2–9.6)
CHLORIDE SERPL-SCNC: 102 MMOL/L (ref 98–107)
CO2 SERPL-SCNC: 26 MMOL/L (ref 22–29)
CREAT SERPL-MCNC: 0.72 MG/DL (ref 0.5–0.9)
DIFFERENTIAL METHOD BLD: ABNORMAL
EOSINOPHIL # BLD: 0.2 K/UL (ref 0–0.4)
EOSINOPHIL NFR BLD: 3 %
ERYTHROCYTE [DISTWIDTH] IN BLOOD BY AUTOMATED COUNT: 14 % (ref 11.5–14.5)
GLOBULIN SER CALC-MCNC: 2.4 G/DL (ref 2–4)
GLUCOSE SERPL-MCNC: 159 MG/DL (ref 65–100)
HCT VFR BLD AUTO: 41.4 % (ref 35–47)
HGB BLD-MCNC: 13.9 G/DL (ref 11.5–16)
IMM GRANULOCYTES # BLD AUTO: 0 K/UL (ref 0–0.04)
IMM GRANULOCYTES NFR BLD AUTO: 1 % (ref 0–0.5)
LYMPHOCYTES # BLD: 1.9 K/UL (ref 0.8–3.5)
LYMPHOCYTES NFR BLD: 26 % (ref 12–49)
MCH RBC QN AUTO: 31.7 PG (ref 26–34)
MCHC RBC AUTO-ENTMCNC: 33.6 G/DL (ref 30–36.5)
MCV RBC AUTO: 94.5 FL (ref 80–99)
MONOCYTES # BLD: 0.5 K/UL (ref 0–1)
MONOCYTES NFR BLD: 7 % (ref 5–13)
NEUTS SEG # BLD: 4.6 K/UL (ref 1.8–8)
NEUTS SEG NFR BLD: 63 % (ref 32–75)
NRBC # BLD: 0 K/UL (ref 0–0.01)
NRBC BLD-RTO: 0 PER 100 WBC
NT PRO BNP: 1132 PG/ML
PLATELET # BLD AUTO: 175 K/UL (ref 150–400)
PMV BLD AUTO: 10.3 FL (ref 8.9–12.9)
POTASSIUM SERPL-SCNC: 4.3 MMOL/L (ref 3.5–5.1)
PROT SERPL-MCNC: 6.3 G/DL (ref 6.4–8.3)
RBC # BLD AUTO: 4.38 M/UL (ref 3.8–5.2)
SODIUM SERPL-SCNC: 136 MMOL/L (ref 136–145)
TROPONIN T SERPL HS-MCNC: 13.1 NG/L (ref 0–14)
WBC # BLD AUTO: 7.3 K/UL (ref 3.6–11)

## 2024-12-07 PROCEDURE — 36415 COLL VENOUS BLD VENIPUNCTURE: CPT

## 2024-12-07 PROCEDURE — 80053 COMPREHEN METABOLIC PANEL: CPT

## 2024-12-07 PROCEDURE — 71045 X-RAY EXAM CHEST 1 VIEW: CPT

## 2024-12-07 PROCEDURE — 2580000003 HC RX 258: Performed by: STUDENT IN AN ORGANIZED HEALTH CARE EDUCATION/TRAINING PROGRAM

## 2024-12-07 PROCEDURE — 84484 ASSAY OF TROPONIN QUANT: CPT

## 2024-12-07 PROCEDURE — 96374 THER/PROPH/DIAG INJ IV PUSH: CPT

## 2024-12-07 PROCEDURE — 99285 EMERGENCY DEPT VISIT HI MDM: CPT

## 2024-12-07 PROCEDURE — 6370000000 HC RX 637 (ALT 250 FOR IP): Performed by: STUDENT IN AN ORGANIZED HEALTH CARE EDUCATION/TRAINING PROGRAM

## 2024-12-07 PROCEDURE — 83880 ASSAY OF NATRIURETIC PEPTIDE: CPT

## 2024-12-07 PROCEDURE — 6360000002 HC RX W HCPCS: Performed by: STUDENT IN AN ORGANIZED HEALTH CARE EDUCATION/TRAINING PROGRAM

## 2024-12-07 PROCEDURE — 93005 ELECTROCARDIOGRAM TRACING: CPT | Performed by: STUDENT IN AN ORGANIZED HEALTH CARE EDUCATION/TRAINING PROGRAM

## 2024-12-07 PROCEDURE — 85025 COMPLETE CBC W/AUTO DIFF WBC: CPT

## 2024-12-07 RX ORDER — PREDNISONE 20 MG/1
40 TABLET ORAL DAILY
Qty: 10 TABLET | Refills: 0 | Status: SHIPPED | OUTPATIENT
Start: 2024-12-07 | End: 2024-12-12

## 2024-12-07 RX ADMIN — WATER 80 MG: 1 INJECTION INTRAMUSCULAR; INTRAVENOUS; SUBCUTANEOUS at 11:20

## 2024-12-07 RX ADMIN — IPRATROPIUM BROMIDE AND ALBUTEROL SULFATE 1 DOSE: 2.5; .5 SOLUTION RESPIRATORY (INHALATION) at 11:22

## 2024-12-07 ASSESSMENT — PAIN SCALES - GENERAL: PAINLEVEL_OUTOF10: 0

## 2024-12-07 NOTE — DISCHARGE INSTRUCTIONS
Take the prescribed prednisone as directed.  Return to the ER symptoms change or worsen or you have any other concerns.  Follow-up with your PCP in 2 days as scheduled.  Follow-up as soon as possible with the pulmonology specialist for further care.

## 2024-12-07 NOTE — ED TRIAGE NOTES
Pt arrives with EMS from an independent living facility. Pt is alert oriented x4. Pt c/o shortness of breath since yesterday, and it worsened this morning. Pt has audible expiratory wheezing on arrival EMS gave one breathing tx en route. Pt reports having a similar episode a couple weeks ago. Pmhx of COPD.

## 2024-12-07 NOTE — ED PROVIDER NOTES
[facility] [place] EMERGENCY DEPT  EMERGENCY DEPARTMENT ENCOUNTER      Pt Name: Zoey Sneed  MRN: [id number]  Birthdate 10/8/1930  Date of evaluation: 12/7/2024  Provider: Brian Koch MD    CHIEF COMPLAINT       Chief Complaint   Patient presents with    Shortness of Breath       HISTORY OF PRESENT ILLNESS    HPI    SOB, wheezing.  Nursing notes reviewed.    REVIEW OF SYSTEMS     Review of Systems  Unless otherwise stated, a complete review of systems was asked of the patient. Pertinent positives are noted in the HPI section.    PAST MEDICAL HISTORY     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    Atrial fibrillation (HCC)     Atrophy of vulva 4/22/2009    Generalized anxiety disorder     Hx of bone density study 2/24/14    Normal    Hypertension     Postmenopausal atrophic vaginitis 4/22/2009    Symptomatic menopausal or female climacteric states     Unspecified asthma(493.90)     Unspecified hypothyroidism        SURGICAL HISTORY       Past Surgical History:   Procedure Laterality Date    HYSTERECTOMY, TOTAL ABDOMINAL (CERVIX REMOVED)  1970    W/ USO    OTHER SURGICAL HISTORY      Anterior Posterior Repair    THYROIDECTOMY, PARTIAL      TONSILLECTOMY      UROLOGICAL SURGERY      TVT       CURRENT MEDICATIONS       Previous Medications    ALBUTEROL (PROVENTIL) (2.5 MG/3ML) 0.083% NEBULIZER SOLUTION    Take 3 mLs by nebulization 4 times daily as needed for Wheezing    ALBUTEROL SULFATE HFA (VENTOLIN HFA) 108 (90 BASE) MCG/ACT INHALER    Inhale 2 puffs into the lungs 4 times daily as needed for Wheezing    ALPRAZOLAM (XANAX) 0.5 MG TABLET    Take 1 tablet by mouth 2 times daily.    APIXABAN (ELIQUIS) 5 MG TABS TABLET    Take 1 tablet by mouth 2 times daily    ASPIRIN 81 PO    Take 81 mg by mouth    ATORVASTATIN (LIPITOR) 20 MG TABLET    Take 1 tablet by mouth    AZELASTINE (ASTELIN) 0.1 % NASAL SPRAY    1 spray 2 times daily    BETHANECHOL (URECHOLINE) 10 MG TABLET     RESULTS   EKG: If obtained, EKG interpretation provided in the ED course    RADIOLOGY:   XR CHEST PORTABLE   Final Result   No acute process.      Electronically signed by Darian Olmedo           LABS:  Labs Reviewed   CBC WITH AUTO DIFFERENTIAL - Abnormal; Notable for the following components:       Result Value    Eosinophils % 3 (*)     Immature Granulocytes % 1 (*)     All other components within normal limits   COMPREHENSIVE METABOLIC PANEL - Abnormal; Notable for the following components:    Glucose 159 (*)     BUN/Creatinine Ratio 29 (*)     Total Protein 6.3 (*)     All other components within normal limits   BRAIN NATRIURETIC PEPTIDE - Abnormal; Notable for the following components:    NT Pro-BNP 1,132 (*)     All other components within normal limits   TROPONIN T       All other labs were within normal range or not returned as of this dictation.    EMERGENCY DEPARTMENT COURSE and DIFFERENTIAL DIAGNOSIS/MDM:   Vitals:    Vitals:    12/07/24 1058 12/07/24 1200 12/07/24 1215   BP: (!) 158/72 (!) 150/79    Pulse: (!) 104 96 100   Resp: 24 23    Temp: 97.8 °F (36.6 °C)     TempSrc: Axillary     SpO2: 97% 100% 94%   Weight: 50.3 kg (111 lb)     Height: 1.6 m (5' 3\")         Medical Decision Making  89-year-old female history of A-fib on Eliquis, COPD, here for shortness of breath and wheezing.  Patient reports 2 days of symptoms, typical of her asthma exacerbations.  She tried her home nebulizer treatments 1 time yesterday without significant relief.  Called EMS this morning due to ongoing wheezing.  She received 1 DuoNeb en route and states she does feel better now.  She denies chest pain back pain cough hemoptysis.  No fevers chills sore throat rhinorrhea.  No nausea vomiting diarrhea abdominal pain.    She was seen here 2 weeks ago for similar, received asthma treatments and was discharged, states she completed a course of prednisone and her symptoms did significantly improve.  However they flared up again 2

## 2024-12-08 LAB
EKG DIAGNOSIS: NORMAL
EKG Q-T INTERVAL: 334 MS
EKG QRS DURATION: 68 MS
EKG QTC CALCULATION (BAZETT): 415 MS
EKG R AXIS: -28 DEGREES
EKG T AXIS: 40 DEGREES
EKG VENTRICULAR RATE: 93 BPM

## 2024-12-08 PROCEDURE — 93010 ELECTROCARDIOGRAM REPORT: CPT | Performed by: SPECIALIST

## 2025-01-09 ENCOUNTER — HOSPITAL ENCOUNTER (INPATIENT)
Facility: HOSPITAL | Age: 89
LOS: 4 days | Discharge: HOME HEALTH | DRG: 190 | End: 2025-01-13
Attending: EMERGENCY MEDICINE | Admitting: INTERNAL MEDICINE
Payer: MEDICARE

## 2025-01-09 ENCOUNTER — APPOINTMENT (OUTPATIENT)
Facility: HOSPITAL | Age: 89
DRG: 190 | End: 2025-01-09
Payer: MEDICARE

## 2025-01-09 DIAGNOSIS — R79.89 ELEVATED TROPONIN: ICD-10-CM

## 2025-01-09 DIAGNOSIS — I16.1 HYPERTENSIVE EMERGENCY: ICD-10-CM

## 2025-01-09 DIAGNOSIS — J44.1 COPD EXACERBATION (HCC): ICD-10-CM

## 2025-01-09 DIAGNOSIS — R06.03 RESPIRATORY DISTRESS: Primary | ICD-10-CM

## 2025-01-09 PROBLEM — J44.9 COPD (CHRONIC OBSTRUCTIVE PULMONARY DISEASE) (HCC): Status: ACTIVE | Noted: 2025-01-09

## 2025-01-09 PROBLEM — J96.01 ACUTE HYPOXIC RESPIRATORY FAILURE: Status: ACTIVE | Noted: 2025-01-09

## 2025-01-09 LAB
ALBUMIN SERPL-MCNC: 4.2 G/DL (ref 3.5–5.2)
ALBUMIN/GLOB SERPL: 1.5 (ref 1.1–2.2)
ALP SERPL-CCNC: 97 U/L (ref 35–104)
ALT SERPL-CCNC: 16 U/L (ref 10–35)
ANION GAP SERPL CALC-SCNC: 9 MMOL/L (ref 2–12)
AST SERPL-CCNC: 32 U/L (ref 10–35)
B PERT DNA SPEC QL NAA+PROBE: NOT DETECTED
BASE EXCESS BLD CALC-SCNC: 0.7 MMOL/L
BASOPHILS # BLD: 0.06 K/UL (ref 0–0.1)
BASOPHILS NFR BLD: 0.9 % (ref 0–1)
BILIRUB SERPL-MCNC: 0.6 MG/DL (ref 0.2–1)
BORDETELLA PARAPERTUSSIS BY PCR: NOT DETECTED
BUN SERPL-MCNC: 14 MG/DL (ref 8–23)
BUN/CREAT SERPL: 22 (ref 12–20)
C PNEUM DNA SPEC QL NAA+PROBE: NOT DETECTED
CALCIUM SERPL-MCNC: 9.3 MG/DL (ref 8.2–9.6)
CHLORIDE SERPL-SCNC: 101 MMOL/L (ref 98–107)
CO2 SERPL-SCNC: 27 MMOL/L (ref 22–29)
CREAT SERPL-MCNC: 0.64 MG/DL (ref 0.5–0.9)
D DIMER PPP FEU-MCNC: 0.69 UG/ML(FEU)
DIFFERENTIAL METHOD BLD: ABNORMAL
EKG DIAGNOSIS: NORMAL
EKG Q-T INTERVAL: 320 MS
EKG QRS DURATION: 76 MS
EKG QTC CALCULATION (BAZETT): 408 MS
EKG R AXIS: -39 DEGREES
EKG T AXIS: 48 DEGREES
EKG VENTRICULAR RATE: 98 BPM
EOSINOPHIL # BLD: 0.43 K/UL (ref 0–0.4)
EOSINOPHIL NFR BLD: 6.6 % (ref 0–7)
ERYTHROCYTE [DISTWIDTH] IN BLOOD BY AUTOMATED COUNT: 13.6 % (ref 11.5–14.5)
FLUAV RNA SPEC QL NAA+PROBE: NOT DETECTED
FLUAV SUBTYP SPEC NAA+PROBE: NOT DETECTED
FLUBV RNA SPEC QL NAA+PROBE: NOT DETECTED
FLUBV RNA SPEC QL NAA+PROBE: NOT DETECTED
GLOBULIN SER CALC-MCNC: 2.8 G/DL (ref 2–4)
GLUCOSE BLD STRIP.AUTO-MCNC: 227 MG/DL (ref 65–117)
GLUCOSE SERPL-MCNC: 109 MG/DL (ref 65–100)
HADV DNA SPEC QL NAA+PROBE: NOT DETECTED
HCO3 BLD-SCNC: 26.3 MMOL/L (ref 21–28)
HCOV 229E RNA SPEC QL NAA+PROBE: NOT DETECTED
HCOV HKU1 RNA SPEC QL NAA+PROBE: NOT DETECTED
HCOV NL63 RNA SPEC QL NAA+PROBE: NOT DETECTED
HCOV OC43 RNA SPEC QL NAA+PROBE: NOT DETECTED
HCT VFR BLD AUTO: 41.6 % (ref 35–47)
HGB BLD-MCNC: 14 G/DL (ref 11.5–16)
HMPV RNA SPEC QL NAA+PROBE: NOT DETECTED
HPIV1 RNA SPEC QL NAA+PROBE: NOT DETECTED
HPIV2 RNA SPEC QL NAA+PROBE: NOT DETECTED
HPIV3 RNA SPEC QL NAA+PROBE: NOT DETECTED
HPIV4 RNA SPEC QL NAA+PROBE: NOT DETECTED
IMM GRANULOCYTES # BLD AUTO: 0.01 K/UL (ref 0–0.04)
IMM GRANULOCYTES NFR BLD AUTO: 0.2 % (ref 0–0.5)
LYMPHOCYTES # BLD: 2.2 K/UL (ref 0.8–3.5)
LYMPHOCYTES NFR BLD: 33.6 % (ref 12–49)
M PNEUMO DNA SPEC QL NAA+PROBE: NOT DETECTED
MCH RBC QN AUTO: 31.9 PG (ref 26–34)
MCHC RBC AUTO-ENTMCNC: 33.7 G/DL (ref 30–36.5)
MCV RBC AUTO: 94.8 FL (ref 80–99)
MONOCYTES # BLD: 0.61 K/UL (ref 0–1)
MONOCYTES NFR BLD: 9.3 % (ref 5–13)
NEUTS SEG # BLD: 3.23 K/UL (ref 1.8–8)
NEUTS SEG NFR BLD: 49.4 % (ref 32–75)
NRBC # BLD: 0 K/UL (ref 0–0.01)
NRBC BLD-RTO: 0 PER 100 WBC
NT PRO BNP: 932 PG/ML
PCO2 BLD: 44.5 MMHG (ref 35–48)
PH BLD: 7.38 (ref 7.35–7.45)
PLATELET # BLD AUTO: 193 K/UL (ref 150–400)
PMV BLD AUTO: 10 FL (ref 8.9–12.9)
PO2 BLD: 327 MMHG (ref 83–108)
POTASSIUM SERPL-SCNC: 4 MMOL/L (ref 3.5–5.1)
PROCALCITONIN SERPL-MCNC: <0.05 NG/ML
PROT SERPL-MCNC: 7 G/DL (ref 6.4–8.3)
RBC # BLD AUTO: 4.39 M/UL (ref 3.8–5.2)
RSV RNA SPEC QL NAA+PROBE: NOT DETECTED
RV+EV RNA SPEC QL NAA+PROBE: NOT DETECTED
SAO2 % BLD: 99.9 % (ref 92–97)
SARS-COV-2 RNA RESP QL NAA+PROBE: NOT DETECTED
SARS-COV-2 RNA RESP QL NAA+PROBE: NOT DETECTED
SERVICE CMNT-IMP: ABNORMAL
SODIUM SERPL-SCNC: 137 MMOL/L (ref 136–145)
SOURCE: NORMAL
SPECIMEN TYPE: ABNORMAL
TROPONIN T SERPL HS-MCNC: 14.6 NG/L (ref 0–14)
WBC # BLD AUTO: 6.5 K/UL (ref 3.6–11)

## 2025-01-09 PROCEDURE — 84484 ASSAY OF TROPONIN QUANT: CPT

## 2025-01-09 PROCEDURE — 0202U NFCT DS 22 TRGT SARS-COV-2: CPT

## 2025-01-09 PROCEDURE — 2500000003 HC RX 250 WO HCPCS: Performed by: EMERGENCY MEDICINE

## 2025-01-09 PROCEDURE — 93010 ELECTROCARDIOGRAM REPORT: CPT | Performed by: SPECIALIST

## 2025-01-09 PROCEDURE — 2500000003 HC RX 250 WO HCPCS: Performed by: INTERNAL MEDICINE

## 2025-01-09 PROCEDURE — 96375 TX/PRO/DX INJ NEW DRUG ADDON: CPT

## 2025-01-09 PROCEDURE — 82803 BLOOD GASES ANY COMBINATION: CPT

## 2025-01-09 PROCEDURE — 87636 SARSCOV2 & INF A&B AMP PRB: CPT

## 2025-01-09 PROCEDURE — 2580000003 HC RX 258: Performed by: INTERNAL MEDICINE

## 2025-01-09 PROCEDURE — 6360000002 HC RX W HCPCS: Performed by: INTERNAL MEDICINE

## 2025-01-09 PROCEDURE — 84145 PROCALCITONIN (PCT): CPT

## 2025-01-09 PROCEDURE — 83880 ASSAY OF NATRIURETIC PEPTIDE: CPT

## 2025-01-09 PROCEDURE — 97161 PT EVAL LOW COMPLEX 20 MIN: CPT

## 2025-01-09 PROCEDURE — 80053 COMPREHEN METABOLIC PANEL: CPT

## 2025-01-09 PROCEDURE — 97530 THERAPEUTIC ACTIVITIES: CPT

## 2025-01-09 PROCEDURE — 94640 AIRWAY INHALATION TREATMENT: CPT

## 2025-01-09 PROCEDURE — 94762 N-INVAS EAR/PLS OXIMTRY CONT: CPT

## 2025-01-09 PROCEDURE — 96376 TX/PRO/DX INJ SAME DRUG ADON: CPT

## 2025-01-09 PROCEDURE — 71045 X-RAY EXAM CHEST 1 VIEW: CPT

## 2025-01-09 PROCEDURE — 99285 EMERGENCY DEPT VISIT HI MDM: CPT

## 2025-01-09 PROCEDURE — 93005 ELECTROCARDIOGRAM TRACING: CPT | Performed by: EMERGENCY MEDICINE

## 2025-01-09 PROCEDURE — 87040 BLOOD CULTURE FOR BACTERIA: CPT

## 2025-01-09 PROCEDURE — 2580000003 HC RX 258: Performed by: EMERGENCY MEDICINE

## 2025-01-09 PROCEDURE — 97116 GAIT TRAINING THERAPY: CPT

## 2025-01-09 PROCEDURE — 85025 COMPLETE CBC W/AUTO DIFF WBC: CPT

## 2025-01-09 PROCEDURE — 6360000002 HC RX W HCPCS: Performed by: EMERGENCY MEDICINE

## 2025-01-09 PROCEDURE — 6370000000 HC RX 637 (ALT 250 FOR IP): Performed by: INTERNAL MEDICINE

## 2025-01-09 PROCEDURE — 94761 N-INVAS EAR/PLS OXIMETRY MLT: CPT

## 2025-01-09 PROCEDURE — 97165 OT EVAL LOW COMPLEX 30 MIN: CPT

## 2025-01-09 PROCEDURE — 82962 GLUCOSE BLOOD TEST: CPT

## 2025-01-09 PROCEDURE — 2000000000 HC ICU R&B

## 2025-01-09 PROCEDURE — 6370000000 HC RX 637 (ALT 250 FOR IP): Performed by: EMERGENCY MEDICINE

## 2025-01-09 PROCEDURE — 1100000000 HC RM PRIVATE

## 2025-01-09 PROCEDURE — 36415 COLL VENOUS BLD VENIPUNCTURE: CPT

## 2025-01-09 PROCEDURE — 96365 THER/PROPH/DIAG IV INF INIT: CPT

## 2025-01-09 PROCEDURE — 85379 FIBRIN DEGRADATION QUANT: CPT

## 2025-01-09 RX ORDER — LEVOTHYROXINE SODIUM 75 UG/1
75 TABLET ORAL EVERY MORNING
Status: DISCONTINUED | OUTPATIENT
Start: 2025-01-10 | End: 2025-01-13 | Stop reason: HOSPADM

## 2025-01-09 RX ORDER — ACETAMINOPHEN 325 MG/1
650 TABLET ORAL EVERY 6 HOURS PRN
Status: DISCONTINUED | OUTPATIENT
Start: 2025-01-09 | End: 2025-01-13 | Stop reason: HOSPADM

## 2025-01-09 RX ORDER — ALPRAZOLAM 0.5 MG
0.5 TABLET ORAL 2 TIMES DAILY
Status: DISCONTINUED | OUTPATIENT
Start: 2025-01-09 | End: 2025-01-13 | Stop reason: HOSPADM

## 2025-01-09 RX ORDER — METHENAMINE HIPPURATE 1000 MG/1
1 TABLET ORAL 2 TIMES DAILY WITH MEALS
Status: DISCONTINUED | OUTPATIENT
Start: 2025-01-09 | End: 2025-01-13 | Stop reason: HOSPADM

## 2025-01-09 RX ORDER — ENOXAPARIN SODIUM 100 MG/ML
40 INJECTION SUBCUTANEOUS DAILY
Status: DISCONTINUED | OUTPATIENT
Start: 2025-01-09 | End: 2025-01-09

## 2025-01-09 RX ORDER — IPRATROPIUM BROMIDE AND ALBUTEROL SULFATE 2.5; .5 MG/3ML; MG/3ML
1 SOLUTION RESPIRATORY (INHALATION)
Status: DISCONTINUED | OUTPATIENT
Start: 2025-01-09 | End: 2025-01-10

## 2025-01-09 RX ORDER — ATORVASTATIN CALCIUM 20 MG/1
20 TABLET, FILM COATED ORAL NIGHTLY
Status: DISCONTINUED | OUTPATIENT
Start: 2025-01-09 | End: 2025-01-13 | Stop reason: HOSPADM

## 2025-01-09 RX ORDER — AZELASTINE 1 MG/ML
1 SPRAY, METERED NASAL 2 TIMES DAILY
Status: DISCONTINUED | OUTPATIENT
Start: 2025-01-09 | End: 2025-01-09

## 2025-01-09 RX ORDER — ALBUTEROL SULFATE 0.83 MG/ML
2.5 SOLUTION RESPIRATORY (INHALATION)
Status: COMPLETED | OUTPATIENT
Start: 2025-01-09 | End: 2025-01-09

## 2025-01-09 RX ORDER — METOPROLOL SUCCINATE 50 MG/1
50 TABLET, EXTENDED RELEASE ORAL DAILY
Status: DISCONTINUED | OUTPATIENT
Start: 2025-01-09 | End: 2025-01-13 | Stop reason: HOSPADM

## 2025-01-09 RX ORDER — IPRATROPIUM BROMIDE AND ALBUTEROL SULFATE 2.5; .5 MG/3ML; MG/3ML
1 SOLUTION RESPIRATORY (INHALATION)
Status: COMPLETED | OUTPATIENT
Start: 2025-01-09 | End: 2025-01-09

## 2025-01-09 RX ORDER — POTASSIUM CHLORIDE 7.45 MG/ML
10 INJECTION INTRAVENOUS PRN
Status: DISCONTINUED | OUTPATIENT
Start: 2025-01-09 | End: 2025-01-13 | Stop reason: HOSPADM

## 2025-01-09 RX ORDER — SODIUM CHLORIDE 9 MG/ML
INJECTION, SOLUTION INTRAVENOUS PRN
Status: DISCONTINUED | OUTPATIENT
Start: 2025-01-09 | End: 2025-01-13 | Stop reason: HOSPADM

## 2025-01-09 RX ORDER — LEVOTHYROXINE SODIUM 88 UG/1
88 TABLET ORAL EVERY MORNING
Status: DISCONTINUED | OUTPATIENT
Start: 2025-01-09 | End: 2025-01-09

## 2025-01-09 RX ORDER — POTASSIUM CHLORIDE 750 MG/1
40 TABLET, EXTENDED RELEASE ORAL PRN
Status: DISCONTINUED | OUTPATIENT
Start: 2025-01-09 | End: 2025-01-13 | Stop reason: HOSPADM

## 2025-01-09 RX ORDER — BUDESONIDE 0.5 MG/2ML
0.5 INHALANT ORAL
Status: DISCONTINUED | OUTPATIENT
Start: 2025-01-09 | End: 2025-01-09

## 2025-01-09 RX ORDER — ONDANSETRON 2 MG/ML
4 INJECTION INTRAMUSCULAR; INTRAVENOUS EVERY 6 HOURS PRN
Status: DISCONTINUED | OUTPATIENT
Start: 2025-01-09 | End: 2025-01-13 | Stop reason: HOSPADM

## 2025-01-09 RX ORDER — ACETAMINOPHEN 650 MG/1
650 SUPPOSITORY RECTAL EVERY 6 HOURS PRN
Status: DISCONTINUED | OUTPATIENT
Start: 2025-01-09 | End: 2025-01-13 | Stop reason: HOSPADM

## 2025-01-09 RX ORDER — HYDRALAZINE HYDROCHLORIDE 20 MG/ML
5 INJECTION INTRAMUSCULAR; INTRAVENOUS EVERY 6 HOURS PRN
Status: DISCONTINUED | OUTPATIENT
Start: 2025-01-09 | End: 2025-01-13 | Stop reason: HOSPADM

## 2025-01-09 RX ORDER — LISINOPRIL 20 MG/1
40 TABLET ORAL DAILY
Status: DISCONTINUED | OUTPATIENT
Start: 2025-01-09 | End: 2025-01-13 | Stop reason: HOSPADM

## 2025-01-09 RX ORDER — ONDANSETRON 4 MG/1
4 TABLET, ORALLY DISINTEGRATING ORAL EVERY 8 HOURS PRN
Status: DISCONTINUED | OUTPATIENT
Start: 2025-01-09 | End: 2025-01-13 | Stop reason: HOSPADM

## 2025-01-09 RX ORDER — ASPIRIN 81 MG/1
81 TABLET, CHEWABLE ORAL DAILY
Status: DISCONTINUED | OUTPATIENT
Start: 2025-01-09 | End: 2025-01-13 | Stop reason: HOSPADM

## 2025-01-09 RX ORDER — MAGNESIUM SULFATE IN WATER 40 MG/ML
2000 INJECTION, SOLUTION INTRAVENOUS PRN
Status: DISCONTINUED | OUTPATIENT
Start: 2025-01-09 | End: 2025-01-13 | Stop reason: HOSPADM

## 2025-01-09 RX ORDER — SODIUM CHLORIDE 0.9 % (FLUSH) 0.9 %
5-40 SYRINGE (ML) INJECTION PRN
Status: DISCONTINUED | OUTPATIENT
Start: 2025-01-09 | End: 2025-01-13 | Stop reason: HOSPADM

## 2025-01-09 RX ORDER — SODIUM CHLORIDE 0.9 % (FLUSH) 0.9 %
5-40 SYRINGE (ML) INJECTION EVERY 12 HOURS SCHEDULED
Status: DISCONTINUED | OUTPATIENT
Start: 2025-01-09 | End: 2025-01-13 | Stop reason: HOSPADM

## 2025-01-09 RX ORDER — POLYETHYLENE GLYCOL 3350 17 G/17G
17 POWDER, FOR SOLUTION ORAL DAILY PRN
Status: DISCONTINUED | OUTPATIENT
Start: 2025-01-09 | End: 2025-01-13 | Stop reason: HOSPADM

## 2025-01-09 RX ADMIN — ALBUTEROL SULFATE 2.5 MG: 2.5 SOLUTION RESPIRATORY (INHALATION) at 03:39

## 2025-01-09 RX ADMIN — IPRATROPIUM BROMIDE AND ALBUTEROL SULFATE 1 DOSE: 2.5; .5 SOLUTION RESPIRATORY (INHALATION) at 03:45

## 2025-01-09 RX ADMIN — IPRATROPIUM BROMIDE AND ALBUTEROL SULFATE 1 DOSE: 2.5; .5 SOLUTION RESPIRATORY (INHALATION) at 02:39

## 2025-01-09 RX ADMIN — WATER 40 MG: 1 INJECTION INTRAMUSCULAR; INTRAVENOUS; SUBCUTANEOUS at 22:29

## 2025-01-09 RX ADMIN — IPRATROPIUM BROMIDE AND ALBUTEROL SULFATE 1 DOSE: 2.5; .5 SOLUTION RESPIRATORY (INHALATION) at 14:28

## 2025-01-09 RX ADMIN — LISINOPRIL 40 MG: 20 TABLET ORAL at 09:12

## 2025-01-09 RX ADMIN — METOPROLOL SUCCINATE 50 MG: 50 TABLET, EXTENDED RELEASE ORAL at 09:12

## 2025-01-09 RX ADMIN — DOXYCYCLINE 100 MG: 100 INJECTION, POWDER, LYOPHILIZED, FOR SOLUTION INTRAVENOUS at 03:43

## 2025-01-09 RX ADMIN — SODIUM CHLORIDE, PRESERVATIVE FREE 10 ML: 5 INJECTION INTRAVENOUS at 08:43

## 2025-01-09 RX ADMIN — NITROGLYCERIN 0.5 INCH: 20 OINTMENT TOPICAL at 03:39

## 2025-01-09 RX ADMIN — METHENAMINE HIPPURATE 1 G: 1 TABLET ORAL at 16:46

## 2025-01-09 RX ADMIN — ATORVASTATIN CALCIUM 20 MG: 20 TABLET, FILM COATED ORAL at 20:58

## 2025-01-09 RX ADMIN — ASPIRIN 81 MG: 81 TABLET, CHEWABLE ORAL at 09:12

## 2025-01-09 RX ADMIN — WATER 60 MG: 1 INJECTION INTRAMUSCULAR; INTRAVENOUS; SUBCUTANEOUS at 08:31

## 2025-01-09 RX ADMIN — METHENAMINE HIPPURATE 1 G: 1 TABLET ORAL at 09:11

## 2025-01-09 RX ADMIN — WATER 60 MG: 1 INJECTION INTRAMUSCULAR; INTRAVENOUS; SUBCUTANEOUS at 13:54

## 2025-01-09 RX ADMIN — ALPRAZOLAM 0.5 MG: 0.25 TABLET ORAL at 09:11

## 2025-01-09 RX ADMIN — APIXABAN 2.5 MG: 2.5 TABLET, FILM COATED ORAL at 09:12

## 2025-01-09 RX ADMIN — IPRATROPIUM BROMIDE AND ALBUTEROL SULFATE 1 DOSE: 2.5; .5 SOLUTION RESPIRATORY (INHALATION) at 20:36

## 2025-01-09 RX ADMIN — WATER 125 MG: 1 INJECTION INTRAMUSCULAR; INTRAVENOUS; SUBCUTANEOUS at 02:40

## 2025-01-09 RX ADMIN — SODIUM CHLORIDE, PRESERVATIVE FREE 10 ML: 5 INJECTION INTRAVENOUS at 20:58

## 2025-01-09 RX ADMIN — ARFORMOTEROL TARTRATE: 15 SOLUTION RESPIRATORY (INHALATION) at 20:35

## 2025-01-09 RX ADMIN — APIXABAN 2.5 MG: 2.5 TABLET, FILM COATED ORAL at 20:58

## 2025-01-09 RX ADMIN — AZITHROMYCIN MONOHYDRATE 500 MG: 500 INJECTION, POWDER, LYOPHILIZED, FOR SOLUTION INTRAVENOUS at 08:33

## 2025-01-09 RX ADMIN — ALPRAZOLAM 0.5 MG: 0.25 TABLET ORAL at 20:57

## 2025-01-09 NOTE — ED NOTES
Pt arrived via [facility],  provider notified of elevated BP and room assignment.

## 2025-01-09 NOTE — ED PROVIDER NOTES
CHEST PORTABLE   Final Result      No acute process on portable chest.         Electronically signed by Chapin Valdivia            ED BEDSIDE ULTRASOUND:   Performed by ED Physician    LABS:  Labs Reviewed   CBC WITH AUTO DIFFERENTIAL - Abnormal; Notable for the following components:       Result Value    Eosinophils Absolute 0.43 (*)     All other components within normal limits   COMPREHENSIVE METABOLIC PANEL - Abnormal; Notable for the following components:    Glucose 109 (*)     BUN/Creatinine Ratio 22 (*)     All other components within normal limits   TROPONIN T - Abnormal; Notable for the following components:    Troponin T 14.6 (*)     All other components within normal limits   BRAIN NATRIURETIC PEPTIDE - Abnormal; Notable for the following components:    NT Pro- (*)     All other components within normal limits   D-DIMER, RAPID - Abnormal; Notable for the following components:    D-Dimer, Quant 0.69 (*)     All other components within normal limits   ARTERIAL BLOOD GAS, POC - Abnormal; Notable for the following components:    POC PO2 327 (*)     POC O2 SAT 99.9 (*)     All other components within normal limits   COVID-19 & INFLUENZA COMBO   BLOOD GAS, ARTERIAL   BLOOD GAS, ARTERIAL       All other labs were unremarkable or not returned as of this dictation.    EMERGENCY DEPARTMENT COURSE and DIFFERENTIAL DIAGNOSIS/MDM:   Medical Decision Making  89-year-old female presents in respiratory distress.  She has diffuse wheezing on exam.  She was placed on BiPAP with improvement in work of breathing.  She was started on IV Solu-Medrol and doxycycline.  Blood pressures are quite elevated.  Differentials for her presentation include but are not limited to COPD exacerbation, pneumonia, flu, COVID, heart failure, less likely PE given reported anticoagulated state.  Initial ABG unremarkable.  Age-adjusted D-dimer normal.  White blood cell count hemoglobin are normal.  Liver and renal function essentially normal.

## 2025-01-09 NOTE — ED NOTES
Patient asked about code status. She states that she is a DNR. No orders or paperwork at this time.

## 2025-01-09 NOTE — PROGRESS NOTES
Messaged MD about patient's HR and BP.  0835 Orders to resume patient's home medications. Will give once verified by pharmacy.

## 2025-01-09 NOTE — ED TRIAGE NOTES
Patient to triage via EMS for concern of SOB. Patient lives in independent living. Patient has hx of COPD has been in and out of the hospital frequently. Patient states she used  several doses of her inhaler today without relief.

## 2025-01-09 NOTE — ED NOTES
TRANSFER - OUT REPORT:    Verbal report given to Bandar(name) on Zoey Sneed  being transferred to ICU -01 for routine progression of patient care       Report consisted of patient’s Situation, Background, Assessment and   Recommendations(SBAR).     Information from the following report(s) Nurse Handoff Report, ED Encounter Summary, ED SBAR, MAR, and Neuro Assessment was reviewed with the receiving nurse.    Opportunity for questions and clarification was provided.      Patient transported with:   O2 @ 2lpm    Lines: 20g PIV RAC

## 2025-01-09 NOTE — ED NOTES
TRANSFER - OUT REPORT:    Verbal report given to GT Hernandez and JESSICA on Zoey Sneed  being transferred to [facility] ED for routine progression of patient care       Report consisted of patient's Situation, Background, Assessment and   Recommendations(SBAR).     Information from the following report(s) ED Encounter Summary, MAR, Cardiac Rhythm A-Fib with PVCs, Neuro Assessment, and Event Log was reviewed with the receiving nurse.    [place] Assessment:    Presents to emergency department  because of falls (Syncope, seizure, or loss of consciousness): No  Age > 70: Yes  Altered Mental Status, Intoxication with alcohol or substance confusion (Disorientation, impaired judgment, poor safety awaremess, or inability to follow instructions): No  Impaired Mobility: Ambulates or transfers with assistive devices or assistance; Unable to ambulate or transer.: No  Nursing Judgement: Yes          Lines:   Peripheral IV 01/09/25 Right Antecubital (Active)        Opportunity for questions and clarification was provided.      Patient transported with:  Monitor and O2 @ 2lpm

## 2025-01-09 NOTE — H&P
History and Physical    Date of Service:  1/9/2025  Primary Care Provider: Jessie Lord MD  Source of information: Patient, Family, External Medical Records    Chief Complaint: Shortness of Breath      History of Presenting Illness:   Zoey Sneed is a very pleasant 89 y.o. female with a past medical history of A-fib, COPD, hypothyroidism, hypertension, who presented to the emergency room due to worsening shortness of breath, and is being admitted for COPD exacerbation.    Patient reports she has been having several days of worsening shortness of breath.  She states that last week her cough got worse than baseline, and she had increased sputum production, yellow to green in color.  She states she has had worsening shortness of breath over the past few days.  She states she is always slightly short of breath, but this was significantly worse.    In the ER at [place] patient was hypoxic, and required BiPAP.  She was transferred to [facility] ICU for further care.       REVIEW OF SYSTEMS:  A comprehensive review of systems was negative except for that written in the History of Present Illness.     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    Atrial fibrillation (HCC)     Atrophy of vulva 4/22/2009    Generalized anxiety disorder     Hx of bone density study 2/24/14    Normal    Hypertension     Postmenopausal atrophic vaginitis 4/22/2009    Symptomatic menopausal or female climacteric states     Unspecified asthma(493.90)     Unspecified hypothyroidism       Past Surgical History:   Procedure Laterality Date    HYSTERECTOMY, TOTAL ABDOMINAL (CERVIX REMOVED)  1970    W/ USO    OTHER SURGICAL HISTORY      Anterior Posterior Repair    THYROIDECTOMY, PARTIAL      TONSILLECTOMY      UROLOGICAL SURGERY      TVT     Prior to Admission medications    Medication Sig Start Date End Date Taking? Authorizing Provider   albuterol sulfate HFA (VENTOLIN HFA) 108 (90

## 2025-01-10 LAB
ANION GAP SERPL CALC-SCNC: 7 MMOL/L (ref 2–12)
BASOPHILS # BLD: 0 K/UL (ref 0–0.1)
BASOPHILS NFR BLD: 0 % (ref 0–1)
BUN SERPL-MCNC: 17 MG/DL (ref 6–20)
BUN/CREAT SERPL: 20 (ref 12–20)
CALCIUM SERPL-MCNC: 9.3 MG/DL (ref 8.5–10.1)
CHLORIDE SERPL-SCNC: 102 MMOL/L (ref 97–108)
CO2 SERPL-SCNC: 24 MMOL/L (ref 21–32)
CREAT SERPL-MCNC: 0.84 MG/DL (ref 0.55–1.02)
DIFFERENTIAL METHOD BLD: ABNORMAL
EOSINOPHIL # BLD: 0 K/UL (ref 0–0.4)
EOSINOPHIL NFR BLD: 0 % (ref 0–7)
ERYTHROCYTE [DISTWIDTH] IN BLOOD BY AUTOMATED COUNT: 13.7 % (ref 11.5–14.5)
GLUCOSE SERPL-MCNC: 152 MG/DL (ref 65–100)
HCT VFR BLD AUTO: 37.7 % (ref 35–47)
HGB BLD-MCNC: 12.7 G/DL (ref 11.5–16)
IMM GRANULOCYTES # BLD AUTO: 0.05 K/UL (ref 0–0.04)
IMM GRANULOCYTES NFR BLD AUTO: 0.5 % (ref 0–0.5)
LYMPHOCYTES # BLD: 0.92 K/UL (ref 0.8–3.5)
LYMPHOCYTES NFR BLD: 8.5 % (ref 12–49)
MCH RBC QN AUTO: 31.6 PG (ref 26–34)
MCHC RBC AUTO-ENTMCNC: 33.7 G/DL (ref 30–36.5)
MCV RBC AUTO: 93.8 FL (ref 80–99)
MONOCYTES # BLD: 0.39 K/UL (ref 0–1)
MONOCYTES NFR BLD: 3.6 % (ref 5–13)
NEUTS SEG # BLD: 9.43 K/UL (ref 1.8–8)
NEUTS SEG NFR BLD: 87.4 % (ref 32–75)
NRBC # BLD: 0 K/UL (ref 0–0.01)
NRBC BLD-RTO: 0 PER 100 WBC
PHOSPHATE SERPL-MCNC: 2.8 MG/DL (ref 2.6–4.7)
PLATELET # BLD AUTO: 204 K/UL (ref 150–400)
PMV BLD AUTO: 10.3 FL (ref 8.9–12.9)
POTASSIUM SERPL-SCNC: 3.8 MMOL/L (ref 3.5–5.1)
RBC # BLD AUTO: 4.02 M/UL (ref 3.8–5.2)
SODIUM SERPL-SCNC: 133 MMOL/L (ref 136–145)
WBC # BLD AUTO: 10.8 K/UL (ref 3.6–11)

## 2025-01-10 PROCEDURE — 97530 THERAPEUTIC ACTIVITIES: CPT

## 2025-01-10 PROCEDURE — 36415 COLL VENOUS BLD VENIPUNCTURE: CPT

## 2025-01-10 PROCEDURE — 1100000000 HC RM PRIVATE

## 2025-01-10 PROCEDURE — 85025 COMPLETE CBC W/AUTO DIFF WBC: CPT

## 2025-01-10 PROCEDURE — 80048 BASIC METABOLIC PNL TOTAL CA: CPT

## 2025-01-10 PROCEDURE — 84100 ASSAY OF PHOSPHORUS: CPT

## 2025-01-10 PROCEDURE — 6360000002 HC RX W HCPCS: Performed by: INTERNAL MEDICINE

## 2025-01-10 PROCEDURE — 97535 SELF CARE MNGMENT TRAINING: CPT

## 2025-01-10 PROCEDURE — 2500000003 HC RX 250 WO HCPCS: Performed by: INTERNAL MEDICINE

## 2025-01-10 PROCEDURE — 94761 N-INVAS EAR/PLS OXIMETRY MLT: CPT

## 2025-01-10 PROCEDURE — 94010 BREATHING CAPACITY TEST: CPT

## 2025-01-10 PROCEDURE — 94640 AIRWAY INHALATION TREATMENT: CPT

## 2025-01-10 PROCEDURE — 97116 GAIT TRAINING THERAPY: CPT

## 2025-01-10 PROCEDURE — 2580000003 HC RX 258: Performed by: INTERNAL MEDICINE

## 2025-01-10 PROCEDURE — 6370000000 HC RX 637 (ALT 250 FOR IP): Performed by: INTERNAL MEDICINE

## 2025-01-10 RX ORDER — IPRATROPIUM BROMIDE AND ALBUTEROL SULFATE 2.5; .5 MG/3ML; MG/3ML
1 SOLUTION RESPIRATORY (INHALATION) EVERY 4 HOURS PRN
Status: DISCONTINUED | OUTPATIENT
Start: 2025-01-10 | End: 2025-01-13 | Stop reason: HOSPADM

## 2025-01-10 RX ADMIN — SODIUM CHLORIDE, PRESERVATIVE FREE 10 ML: 5 INJECTION INTRAVENOUS at 21:09

## 2025-01-10 RX ADMIN — IPRATROPIUM BROMIDE AND ALBUTEROL SULFATE 1 DOSE: 2.5; .5 SOLUTION RESPIRATORY (INHALATION) at 04:12

## 2025-01-10 RX ADMIN — APIXABAN 2.5 MG: 2.5 TABLET, FILM COATED ORAL at 21:04

## 2025-01-10 RX ADMIN — ALPRAZOLAM 0.5 MG: 0.25 TABLET ORAL at 21:04

## 2025-01-10 RX ADMIN — ALPRAZOLAM 0.5 MG: 0.25 TABLET ORAL at 09:06

## 2025-01-10 RX ADMIN — IPRATROPIUM BROMIDE AND ALBUTEROL SULFATE 1 DOSE: 2.5; .5 SOLUTION RESPIRATORY (INHALATION) at 01:08

## 2025-01-10 RX ADMIN — ARFORMOTEROL TARTRATE: 15 SOLUTION RESPIRATORY (INHALATION) at 08:30

## 2025-01-10 RX ADMIN — AZITHROMYCIN MONOHYDRATE 500 MG: 500 INJECTION, POWDER, LYOPHILIZED, FOR SOLUTION INTRAVENOUS at 09:05

## 2025-01-10 RX ADMIN — ATORVASTATIN CALCIUM 20 MG: 20 TABLET, FILM COATED ORAL at 21:04

## 2025-01-10 RX ADMIN — LEVOTHYROXINE SODIUM 75 MCG: 0.05 TABLET ORAL at 09:06

## 2025-01-10 RX ADMIN — METOPROLOL SUCCINATE 50 MG: 50 TABLET, EXTENDED RELEASE ORAL at 09:06

## 2025-01-10 RX ADMIN — METHENAMINE HIPPURATE 1 G: 1 TABLET ORAL at 09:07

## 2025-01-10 RX ADMIN — WATER 40 MG: 1 INJECTION INTRAMUSCULAR; INTRAVENOUS; SUBCUTANEOUS at 18:36

## 2025-01-10 RX ADMIN — WATER 40 MG: 1 INJECTION INTRAMUSCULAR; INTRAVENOUS; SUBCUTANEOUS at 06:32

## 2025-01-10 RX ADMIN — ASPIRIN 81 MG: 81 TABLET, CHEWABLE ORAL at 09:06

## 2025-01-10 RX ADMIN — METHENAMINE HIPPURATE 1 G: 1 TABLET ORAL at 17:11

## 2025-01-10 RX ADMIN — SODIUM CHLORIDE, PRESERVATIVE FREE 10 ML: 5 INJECTION INTRAVENOUS at 06:32

## 2025-01-10 RX ADMIN — APIXABAN 2.5 MG: 2.5 TABLET, FILM COATED ORAL at 09:19

## 2025-01-10 ASSESSMENT — COPD QUESTIONNAIRES: GOLD_GROUP: GROUP B

## 2025-01-10 NOTE — PROGRESS NOTES
01/10/25 1408   COPD Assessment (CAT Score)   $RT COPD Assessment Yes   GOLD Staging   Group Group B     Pt adm 1/9 requiring BIPAP in ER.  Now on room air.  Pt does not wear O2 at home.  Pt does not follow with Pulm and prefers to follow with her PCP.

## 2025-01-10 NOTE — PROGRESS NOTES
Hospitalist Progress Note  Tawny Colmenares MD  Answering service: [phone] OR [phone] from in house phone        Date of Service:  1/10/2025  NAME:  Zoey Sneed  :  10/8/1930  MRN:  [id number]      Admission Summary/HPI:   Zoey Sneed is a very pleasant 89 y.o. female with a past medical history of A-fib, COPD, hypothyroidism, hypertension, who presented to the emergency room due to worsening shortness of breath, and is being admitted for COPD exacerbation.     Interval history / Subjective:   Patient states she feels much much better than yesterday.  She is now on room air/intermittently 2 L of oxygen, but continues to have diffuse wheezing on exam.  Continues to have productive cough.     Assessment & Plan:     COPD with acute exacerbation  Acute hypoxic respiratory failure  COPD exacerbation, acute, POA  --Increased cough, dyspnea, sputum production  -- Continues to have diffuse wheezing  -- 2 L of oxygen  --Continue steroids with methylprednisolone 40 mg every 6 hours  --Continue azithromycin for anti-inflammatory effect  --Continue duo nebs per RT with protocol  --Consult pulmonary if no improvement     A-fib  - Continue aspirin, apixaban  - Continuous telemetry  - Continue metoprolol  - Intermittently tacky to 110, especially when getting nebulizers     Accelerated hypertension  - Continue metoprolol, lisinopril, and add hydralazine as needed     Hypothyroidism, POA  --Obtain TSH thyroid  --continue levothyroxine     Anxiety  - Xanax as needed     Hyperlipidemia, POA  --Check lipid panel  --Continue statin      I have independently reviewed and interpreted patient's lab and other diagnostic data  External notes were reviewed  Critical Care Time: 0 excluding procedures    [place] POLST: No    Code status: Full  Prophylaxis: Heparin, SCD  Care Plan discussed with: Patient, RN, Multidisciplinary Rounds  Anticipated

## 2025-01-11 PROCEDURE — 6360000002 HC RX W HCPCS: Performed by: INTERNAL MEDICINE

## 2025-01-11 PROCEDURE — 2500000003 HC RX 250 WO HCPCS

## 2025-01-11 PROCEDURE — 94761 N-INVAS EAR/PLS OXIMETRY MLT: CPT

## 2025-01-11 PROCEDURE — 6360000002 HC RX W HCPCS

## 2025-01-11 PROCEDURE — 6370000000 HC RX 637 (ALT 250 FOR IP): Performed by: INTERNAL MEDICINE

## 2025-01-11 PROCEDURE — 2580000003 HC RX 258: Performed by: INTERNAL MEDICINE

## 2025-01-11 PROCEDURE — 2500000003 HC RX 250 WO HCPCS: Performed by: INTERNAL MEDICINE

## 2025-01-11 PROCEDURE — 1100000000 HC RM PRIVATE

## 2025-01-11 PROCEDURE — 94640 AIRWAY INHALATION TREATMENT: CPT

## 2025-01-11 RX ORDER — GUAIFENESIN 600 MG/1
600 TABLET, EXTENDED RELEASE ORAL 2 TIMES DAILY
Status: DISCONTINUED | OUTPATIENT
Start: 2025-01-11 | End: 2025-01-13 | Stop reason: HOSPADM

## 2025-01-11 RX ORDER — BENZONATATE 100 MG/1
100 CAPSULE ORAL 3 TIMES DAILY PRN
Status: DISCONTINUED | OUTPATIENT
Start: 2025-01-11 | End: 2025-01-13 | Stop reason: HOSPADM

## 2025-01-11 RX ADMIN — WATER 5 MG: 1 INJECTION INTRAMUSCULAR; INTRAVENOUS; SUBCUTANEOUS at 01:35

## 2025-01-11 RX ADMIN — APIXABAN 2.5 MG: 2.5 TABLET, FILM COATED ORAL at 20:21

## 2025-01-11 RX ADMIN — BENZONATATE 100 MG: 100 CAPSULE ORAL at 20:21

## 2025-01-11 RX ADMIN — WATER 40 MG: 1 INJECTION INTRAMUSCULAR; INTRAVENOUS; SUBCUTANEOUS at 09:49

## 2025-01-11 RX ADMIN — LISINOPRIL 40 MG: 20 TABLET ORAL at 09:34

## 2025-01-11 RX ADMIN — APIXABAN 2.5 MG: 2.5 TABLET, FILM COATED ORAL at 09:35

## 2025-01-11 RX ADMIN — ATORVASTATIN CALCIUM 20 MG: 20 TABLET, FILM COATED ORAL at 20:21

## 2025-01-11 RX ADMIN — METHENAMINE HIPPURATE 1 G: 1 TABLET ORAL at 17:04

## 2025-01-11 RX ADMIN — METOPROLOL SUCCINATE 50 MG: 50 TABLET, EXTENDED RELEASE ORAL at 09:35

## 2025-01-11 RX ADMIN — ARFORMOTEROL TARTRATE: 15 SOLUTION RESPIRATORY (INHALATION) at 21:58

## 2025-01-11 RX ADMIN — ARFORMOTEROL TARTRATE: 15 SOLUTION RESPIRATORY (INHALATION) at 08:33

## 2025-01-11 RX ADMIN — AZITHROMYCIN MONOHYDRATE 500 MG: 500 INJECTION, POWDER, LYOPHILIZED, FOR SOLUTION INTRAVENOUS at 09:56

## 2025-01-11 RX ADMIN — ASPIRIN 81 MG: 81 TABLET, CHEWABLE ORAL at 09:35

## 2025-01-11 RX ADMIN — SODIUM CHLORIDE, PRESERVATIVE FREE 10 ML: 5 INJECTION INTRAVENOUS at 09:39

## 2025-01-11 RX ADMIN — METHENAMINE HIPPURATE 1 G: 1 TABLET ORAL at 09:34

## 2025-01-11 RX ADMIN — ALPRAZOLAM 0.5 MG: 0.25 TABLET ORAL at 09:35

## 2025-01-11 RX ADMIN — ALPRAZOLAM 0.5 MG: 0.25 TABLET ORAL at 20:21

## 2025-01-11 RX ADMIN — GUAIFENESIN 600 MG: 600 TABLET ORAL at 09:48

## 2025-01-11 RX ADMIN — BENZONATATE 100 MG: 100 CAPSULE ORAL at 09:36

## 2025-01-11 RX ADMIN — SODIUM CHLORIDE, PRESERVATIVE FREE 10 ML: 5 INJECTION INTRAVENOUS at 20:21

## 2025-01-11 RX ADMIN — WATER 40 MG: 1 INJECTION INTRAMUSCULAR; INTRAVENOUS; SUBCUTANEOUS at 17:04

## 2025-01-11 RX ADMIN — Medication 3 MG: at 20:21

## 2025-01-11 RX ADMIN — LEVOTHYROXINE SODIUM 75 MCG: 0.05 TABLET ORAL at 09:48

## 2025-01-11 NOTE — PROGRESS NOTES
Hospitalist Progress Note  Tawny Colmenares MD  Answering service: [phone] OR [phone] from in house phone        Date of Service:  2025  NAME:  Zoey Sneed  :  10/8/1930  MRN:  [id number]      Admission Summary/HPI:   Zoey Sneed is a very pleasant 89 y.o. female with a past medical history of A-fib, COPD, hypothyroidism, hypertension, who presented to the emergency room due to worsening shortness of breath, and is being admitted for COPD exacerbation    Interval history / Subjective:   On room air but has diffuse wheezing.  Wants to go home, but does not appear well. Needs more IV steroids.      Assessment & Plan:     COPD with acute exacerbation  Acute hypoxic respiratory failure  COPD exacerbation, acute, POA  --Increased cough, dyspnea, sputum production  -- Continues to have diffuse wheezing, not safe for DC  --Now on Room air  --Continue steroids with methylprednisolone 40 mg every 6 hours  --Continue azithromycin for anti-inflammatory effect  --Continue duo nebs per RT with protocol  --Consult pulmonary if no improvement     A-fib  - Continue aspirin, apixaban  - Continuous telemetry  - Continue metoprolol  - Intermittently tacky to 110, especially when getting nebulizers     Accelerated hypertension  - Continue metoprolol, lisinopril, and add hydralazine as needed     Hypothyroidism, POA  --Obtain TSH thyroid  --continue levothyroxine     Anxiety  - Xanax as needed     Hyperlipidemia, POA  --Check lipid panel  --Continue statin      I have independently reviewed and interpreted patient's lab and other diagnostic data  External notes were reviewed  Critical Care Time: 0 excluding procedures    [place] POLST: No    Code status: Full  Prophylaxis: Heparin, SCD  Care Plan discussed with: Patient, RN, Multidisciplinary Rounds  Anticipated Disposition:      Principal Problem:    Acute hypoxic respiratory

## 2025-01-12 ENCOUNTER — APPOINTMENT (OUTPATIENT)
Facility: HOSPITAL | Age: 89
DRG: 190 | End: 2025-01-12
Payer: MEDICARE

## 2025-01-12 LAB
ANION GAP SERPL CALC-SCNC: 6 MMOL/L (ref 2–12)
BASOPHILS # BLD: 0.01 K/UL (ref 0–0.1)
BASOPHILS NFR BLD: 0.1 % (ref 0–1)
BUN SERPL-MCNC: 32 MG/DL (ref 6–20)
BUN/CREAT SERPL: 42 (ref 12–20)
CALCIUM SERPL-MCNC: 8.9 MG/DL (ref 8.5–10.1)
CHLORIDE SERPL-SCNC: 107 MMOL/L (ref 97–108)
CO2 SERPL-SCNC: 23 MMOL/L (ref 21–32)
CREAT SERPL-MCNC: 0.76 MG/DL (ref 0.55–1.02)
DIFFERENTIAL METHOD BLD: ABNORMAL
EOSINOPHIL # BLD: 0 K/UL (ref 0–0.4)
EOSINOPHIL NFR BLD: 0 % (ref 0–7)
ERYTHROCYTE [DISTWIDTH] IN BLOOD BY AUTOMATED COUNT: 13.9 % (ref 11.5–14.5)
GLUCOSE SERPL-MCNC: 120 MG/DL (ref 65–100)
HCT VFR BLD AUTO: 39.9 % (ref 35–47)
HGB BLD-MCNC: 13.6 G/DL (ref 11.5–16)
IMM GRANULOCYTES # BLD AUTO: 0.07 K/UL (ref 0–0.04)
IMM GRANULOCYTES NFR BLD AUTO: 0.7 % (ref 0–0.5)
LYMPHOCYTES # BLD: 0.85 K/UL (ref 0.8–3.5)
LYMPHOCYTES NFR BLD: 8.8 % (ref 12–49)
MCH RBC QN AUTO: 32 PG (ref 26–34)
MCHC RBC AUTO-ENTMCNC: 34.1 G/DL (ref 30–36.5)
MCV RBC AUTO: 93.9 FL (ref 80–99)
MONOCYTES # BLD: 0.47 K/UL (ref 0–1)
MONOCYTES NFR BLD: 4.9 % (ref 5–13)
NEUTS SEG # BLD: 8.21 K/UL (ref 1.8–8)
NEUTS SEG NFR BLD: 85.5 % (ref 32–75)
NRBC # BLD: 0 K/UL (ref 0–0.01)
NRBC BLD-RTO: 0 PER 100 WBC
PHOSPHATE SERPL-MCNC: 2.8 MG/DL (ref 2.6–4.7)
PLATELET # BLD AUTO: 192 K/UL (ref 150–400)
PMV BLD AUTO: 10.6 FL (ref 8.9–12.9)
POTASSIUM SERPL-SCNC: 4.2 MMOL/L (ref 3.5–5.1)
RBC # BLD AUTO: 4.25 M/UL (ref 3.8–5.2)
SODIUM SERPL-SCNC: 136 MMOL/L (ref 136–145)
WBC # BLD AUTO: 9.6 K/UL (ref 3.6–11)

## 2025-01-12 PROCEDURE — 85025 COMPLETE CBC W/AUTO DIFF WBC: CPT

## 2025-01-12 PROCEDURE — 2500000003 HC RX 250 WO HCPCS: Performed by: INTERNAL MEDICINE

## 2025-01-12 PROCEDURE — 1100000000 HC RM PRIVATE

## 2025-01-12 PROCEDURE — 80048 BASIC METABOLIC PNL TOTAL CA: CPT

## 2025-01-12 PROCEDURE — 6370000000 HC RX 637 (ALT 250 FOR IP): Performed by: INTERNAL MEDICINE

## 2025-01-12 PROCEDURE — 94761 N-INVAS EAR/PLS OXIMETRY MLT: CPT

## 2025-01-12 PROCEDURE — 6370000000 HC RX 637 (ALT 250 FOR IP): Performed by: NURSE PRACTITIONER

## 2025-01-12 PROCEDURE — 6360000002 HC RX W HCPCS: Performed by: INTERNAL MEDICINE

## 2025-01-12 PROCEDURE — 6370000000 HC RX 637 (ALT 250 FOR IP): Performed by: STUDENT IN AN ORGANIZED HEALTH CARE EDUCATION/TRAINING PROGRAM

## 2025-01-12 PROCEDURE — 6360000002 HC RX W HCPCS: Performed by: NURSE PRACTITIONER

## 2025-01-12 PROCEDURE — 94640 AIRWAY INHALATION TREATMENT: CPT

## 2025-01-12 PROCEDURE — 2500000003 HC RX 250 WO HCPCS: Performed by: NURSE PRACTITIONER

## 2025-01-12 PROCEDURE — 84100 ASSAY OF PHOSPHORUS: CPT

## 2025-01-12 PROCEDURE — 70490 CT SOFT TISSUE NECK W/O DYE: CPT

## 2025-01-12 PROCEDURE — 36415 COLL VENOUS BLD VENIPUNCTURE: CPT

## 2025-01-12 PROCEDURE — 2580000003 HC RX 258: Performed by: INTERNAL MEDICINE

## 2025-01-12 RX ORDER — IPRATROPIUM BROMIDE AND ALBUTEROL SULFATE 2.5; .5 MG/3ML; MG/3ML
1 SOLUTION RESPIRATORY (INHALATION)
Status: DISCONTINUED | OUTPATIENT
Start: 2025-01-12 | End: 2025-01-13

## 2025-01-12 RX ADMIN — HYDRALAZINE HYDROCHLORIDE 5 MG: 20 INJECTION, SOLUTION INTRAMUSCULAR; INTRAVENOUS at 04:34

## 2025-01-12 RX ADMIN — POLYETHYLENE GLYCOL 3350 17 G: 17 POWDER, FOR SOLUTION ORAL at 10:21

## 2025-01-12 RX ADMIN — ALPRAZOLAM 0.5 MG: 0.25 TABLET ORAL at 09:23

## 2025-01-12 RX ADMIN — IPRATROPIUM BROMIDE AND ALBUTEROL SULFATE 1 DOSE: 2.5; .5 SOLUTION RESPIRATORY (INHALATION) at 21:35

## 2025-01-12 RX ADMIN — ARFORMOTEROL TARTRATE: 15 SOLUTION RESPIRATORY (INHALATION) at 07:30

## 2025-01-12 RX ADMIN — AZITHROMYCIN MONOHYDRATE 500 MG: 500 INJECTION, POWDER, LYOPHILIZED, FOR SOLUTION INTRAVENOUS at 09:22

## 2025-01-12 RX ADMIN — ASPIRIN 81 MG: 81 TABLET, CHEWABLE ORAL at 09:27

## 2025-01-12 RX ADMIN — ARFORMOTEROL TARTRATE: 15 SOLUTION RESPIRATORY (INHALATION) at 21:35

## 2025-01-12 RX ADMIN — ALPRAZOLAM 0.5 MG: 0.25 TABLET ORAL at 20:48

## 2025-01-12 RX ADMIN — SODIUM CHLORIDE, PRESERVATIVE FREE 10 ML: 5 INJECTION INTRAVENOUS at 20:50

## 2025-01-12 RX ADMIN — LEVOTHYROXINE SODIUM 75 MCG: 0.05 TABLET ORAL at 05:05

## 2025-01-12 RX ADMIN — METHENAMINE HIPPURATE 1 G: 1 TABLET ORAL at 10:21

## 2025-01-12 RX ADMIN — APIXABAN 2.5 MG: 2.5 TABLET, FILM COATED ORAL at 20:49

## 2025-01-12 RX ADMIN — GUAIFENESIN 600 MG: 600 TABLET ORAL at 20:50

## 2025-01-12 RX ADMIN — METOPROLOL SUCCINATE 50 MG: 50 TABLET, EXTENDED RELEASE ORAL at 09:27

## 2025-01-12 RX ADMIN — Medication 6 MG: at 20:49

## 2025-01-12 RX ADMIN — GUAIFENESIN 600 MG: 600 TABLET ORAL at 09:22

## 2025-01-12 RX ADMIN — ATORVASTATIN CALCIUM 20 MG: 20 TABLET, FILM COATED ORAL at 20:49

## 2025-01-12 RX ADMIN — WATER 2.5 MG: 1 INJECTION INTRAMUSCULAR; INTRAVENOUS; SUBCUTANEOUS at 02:15

## 2025-01-12 RX ADMIN — BENZONATATE 100 MG: 100 CAPSULE ORAL at 20:48

## 2025-01-12 RX ADMIN — APIXABAN 2.5 MG: 2.5 TABLET, FILM COATED ORAL at 09:22

## 2025-01-12 RX ADMIN — LISINOPRIL 40 MG: 20 TABLET ORAL at 09:22

## 2025-01-12 RX ADMIN — IPRATROPIUM BROMIDE AND ALBUTEROL SULFATE 1 DOSE: 2.5; .5 SOLUTION RESPIRATORY (INHALATION) at 14:39

## 2025-01-12 RX ADMIN — WATER 40 MG: 1 INJECTION INTRAMUSCULAR; INTRAVENOUS; SUBCUTANEOUS at 04:37

## 2025-01-12 RX ADMIN — WATER 40 MG: 1 INJECTION INTRAMUSCULAR; INTRAVENOUS; SUBCUTANEOUS at 18:15

## 2025-01-12 NOTE — PROGRESS NOTES
[facility]                                                                               Hospitalist Progress Note  ROSE GLASS MD          Date of Service:  2025  NAME:  Zoey Sneed  :  10/8/1930  MRN:  [id number]    Please note that this dictation was completed with [employer], the computer voice recognition software.  Quite often unanticipated grammatical, syntax, homophones, and other interpretive errors are inadvertently transcribed by the computer software.  Please disregard these errors.  Please excuse any errors that have escaped final proofreading.    Admission Summary:   89 y.o. female with a past medical history of A-fib, COPD, hypothyroidism, hypertension, who presented to the emergency room due to worsening shortness of breath, and is being admitted for COPD exacerbation        Interval history / Subjective:     Sitter at the bedside.  Some impulsive behavior overnight.  Patient states that \"I am not happy with this wheezing \"     Assessment & Plan:     Anticipated discharge date :   Anticipated disposition : Home  Barriers to discharge : Medical stability     COPD with acute exacerbation  Acute hypoxic respiratory failure  COPD exacerbation, acute, POA  Expiratory stridor  Increased cough, dyspnea, sputum production      --Now on Room air  -Check CT neck  --Continue steroids with methylprednisolone 40 mg every 12 hours  --Continue azithromycin for anti-inflammatory effect  --Continue duo nebs per RT with protocol  --Consult pulmonary if no improvement     A-fib  - Continue aspirin, apixaban  - Continuous telemetry  - Continue metoprolol  - Intermittently tacky to 110, especially when getting nebulizers     Accelerated hypertension  - Continue metoprolol, lisinopril, and add hydralazine as needed     Hypothyroidism, POA    --continue levothyroxine     Anxiety  - Xanax as needed     Hyperlipidemia, POA    --Continue statin        Code status:

## 2025-01-12 NOTE — PROGRESS NOTES
Pt very agitated, standing up fighting and refusing to get back on bed. Pt does not have any med PRN for this behaviour. Pls advise.

## 2025-01-13 VITALS
RESPIRATION RATE: 18 BRPM | OXYGEN SATURATION: 96 % | DIASTOLIC BLOOD PRESSURE: 84 MMHG | WEIGHT: 127.43 LBS | TEMPERATURE: 97.9 F | SYSTOLIC BLOOD PRESSURE: 153 MMHG | BODY MASS INDEX: 22.57 KG/M2 | HEART RATE: 88 BPM

## 2025-01-13 LAB
ANION GAP SERPL CALC-SCNC: 7 MMOL/L (ref 2–12)
BASOPHILS # BLD: 0.01 K/UL (ref 0–0.1)
BASOPHILS NFR BLD: 0.1 % (ref 0–1)
BUN SERPL-MCNC: 31 MG/DL (ref 6–20)
BUN/CREAT SERPL: 38 (ref 12–20)
CALCIUM SERPL-MCNC: 8.7 MG/DL (ref 8.5–10.1)
CHLORIDE SERPL-SCNC: 106 MMOL/L (ref 97–108)
CO2 SERPL-SCNC: 21 MMOL/L (ref 21–32)
CREAT SERPL-MCNC: 0.82 MG/DL (ref 0.55–1.02)
DIFFERENTIAL METHOD BLD: ABNORMAL
EOSINOPHIL # BLD: 0 K/UL (ref 0–0.4)
EOSINOPHIL NFR BLD: 0 % (ref 0–7)
ERYTHROCYTE [DISTWIDTH] IN BLOOD BY AUTOMATED COUNT: 14 % (ref 11.5–14.5)
GLUCOSE SERPL-MCNC: 140 MG/DL (ref 65–100)
HCT VFR BLD AUTO: 40.5 % (ref 35–47)
HGB BLD-MCNC: 13.7 G/DL (ref 11.5–16)
IMM GRANULOCYTES # BLD AUTO: 0.07 K/UL (ref 0–0.04)
IMM GRANULOCYTES NFR BLD AUTO: 0.7 % (ref 0–0.5)
LYMPHOCYTES # BLD: 0.74 K/UL (ref 0.8–3.5)
LYMPHOCYTES NFR BLD: 7.6 % (ref 12–49)
MAGNESIUM SERPL-MCNC: 2.1 MG/DL (ref 1.6–2.4)
MCH RBC QN AUTO: 31.8 PG (ref 26–34)
MCHC RBC AUTO-ENTMCNC: 33.8 G/DL (ref 30–36.5)
MCV RBC AUTO: 94 FL (ref 80–99)
MONOCYTES # BLD: 0.36 K/UL (ref 0–1)
MONOCYTES NFR BLD: 3.7 % (ref 5–13)
NEUTS SEG # BLD: 8.53 K/UL (ref 1.8–8)
NEUTS SEG NFR BLD: 87.9 % (ref 32–75)
NRBC # BLD: 0 K/UL (ref 0–0.01)
NRBC BLD-RTO: 0 PER 100 WBC
PHOSPHATE SERPL-MCNC: 3.1 MG/DL (ref 2.6–4.7)
PLATELET # BLD AUTO: 188 K/UL (ref 150–400)
PLATELET COMMENT: ABNORMAL
PMV BLD AUTO: 10.1 FL (ref 8.9–12.9)
POTASSIUM SERPL-SCNC: 4.4 MMOL/L (ref 3.5–5.1)
RBC # BLD AUTO: 4.31 M/UL (ref 3.8–5.2)
RBC MORPH BLD: ABNORMAL
SODIUM SERPL-SCNC: 134 MMOL/L (ref 136–145)
WBC # BLD AUTO: 9.7 K/UL (ref 3.6–11)

## 2025-01-13 PROCEDURE — 80048 BASIC METABOLIC PNL TOTAL CA: CPT

## 2025-01-13 PROCEDURE — 6360000002 HC RX W HCPCS: Performed by: NURSE PRACTITIONER

## 2025-01-13 PROCEDURE — 2500000003 HC RX 250 WO HCPCS: Performed by: INTERNAL MEDICINE

## 2025-01-13 PROCEDURE — 83735 ASSAY OF MAGNESIUM: CPT

## 2025-01-13 PROCEDURE — 84100 ASSAY OF PHOSPHORUS: CPT

## 2025-01-13 PROCEDURE — 6360000002 HC RX W HCPCS: Performed by: INTERNAL MEDICINE

## 2025-01-13 PROCEDURE — 97116 GAIT TRAINING THERAPY: CPT

## 2025-01-13 PROCEDURE — 2500000003 HC RX 250 WO HCPCS: Performed by: NURSE PRACTITIONER

## 2025-01-13 PROCEDURE — 2580000003 HC RX 258: Performed by: INTERNAL MEDICINE

## 2025-01-13 PROCEDURE — 6370000000 HC RX 637 (ALT 250 FOR IP): Performed by: STUDENT IN AN ORGANIZED HEALTH CARE EDUCATION/TRAINING PROGRAM

## 2025-01-13 PROCEDURE — 85025 COMPLETE CBC W/AUTO DIFF WBC: CPT

## 2025-01-13 PROCEDURE — 97530 THERAPEUTIC ACTIVITIES: CPT

## 2025-01-13 PROCEDURE — 6370000000 HC RX 637 (ALT 250 FOR IP): Performed by: INTERNAL MEDICINE

## 2025-01-13 PROCEDURE — 94640 AIRWAY INHALATION TREATMENT: CPT

## 2025-01-13 PROCEDURE — 36415 COLL VENOUS BLD VENIPUNCTURE: CPT

## 2025-01-13 RX ORDER — BENZONATATE 100 MG/1
100 CAPSULE ORAL 3 TIMES DAILY PRN
Qty: 9 CAPSULE | Refills: 0 | Status: SHIPPED | OUTPATIENT
Start: 2025-01-13 | End: 2025-01-16

## 2025-01-13 RX ORDER — IPRATROPIUM BROMIDE AND ALBUTEROL SULFATE 2.5; .5 MG/3ML; MG/3ML
1 SOLUTION RESPIRATORY (INHALATION)
Status: DISCONTINUED | OUTPATIENT
Start: 2025-01-13 | End: 2025-01-13 | Stop reason: HOSPADM

## 2025-01-13 RX ORDER — GUAIFENESIN 600 MG/1
600 TABLET, EXTENDED RELEASE ORAL 2 TIMES DAILY
Qty: 6 TABLET | Refills: 0 | Status: SHIPPED | OUTPATIENT
Start: 2025-01-13 | End: 2025-01-16

## 2025-01-13 RX ORDER — PREDNISONE 20 MG/1
40 TABLET ORAL DAILY
Qty: 4 TABLET | Refills: 0 | Status: SHIPPED | OUTPATIENT
Start: 2025-01-14 | End: 2025-01-16

## 2025-01-13 RX ORDER — PREDNISONE 20 MG/1
40 TABLET ORAL DAILY
Status: DISCONTINUED | OUTPATIENT
Start: 2025-01-13 | End: 2025-01-13 | Stop reason: HOSPADM

## 2025-01-13 RX ADMIN — APIXABAN 2.5 MG: 2.5 TABLET, FILM COATED ORAL at 08:05

## 2025-01-13 RX ADMIN — SODIUM CHLORIDE, PRESERVATIVE FREE 10 ML: 5 INJECTION INTRAVENOUS at 08:05

## 2025-01-13 RX ADMIN — LISINOPRIL 40 MG: 20 TABLET ORAL at 08:04

## 2025-01-13 RX ADMIN — IPRATROPIUM BROMIDE AND ALBUTEROL SULFATE 1 DOSE: 2.5; .5 SOLUTION RESPIRATORY (INHALATION) at 07:16

## 2025-01-13 RX ADMIN — METOPROLOL SUCCINATE 50 MG: 50 TABLET, EXTENDED RELEASE ORAL at 08:04

## 2025-01-13 RX ADMIN — ALPRAZOLAM 0.5 MG: 0.25 TABLET ORAL at 08:05

## 2025-01-13 RX ADMIN — AZITHROMYCIN MONOHYDRATE 500 MG: 500 INJECTION, POWDER, LYOPHILIZED, FOR SOLUTION INTRAVENOUS at 08:23

## 2025-01-13 RX ADMIN — ARFORMOTEROL TARTRATE: 15 SOLUTION RESPIRATORY (INHALATION) at 07:21

## 2025-01-13 RX ADMIN — IPRATROPIUM BROMIDE AND ALBUTEROL SULFATE 1 DOSE: 2.5; .5 SOLUTION RESPIRATORY (INHALATION) at 13:21

## 2025-01-13 RX ADMIN — ASPIRIN 81 MG: 81 TABLET, CHEWABLE ORAL at 08:04

## 2025-01-13 RX ADMIN — METHENAMINE HIPPURATE 1 G: 1 TABLET ORAL at 08:45

## 2025-01-13 RX ADMIN — PREDNISONE 40 MG: 20 TABLET ORAL at 08:05

## 2025-01-13 RX ADMIN — GUAIFENESIN 600 MG: 600 TABLET ORAL at 08:04

## 2025-01-13 RX ADMIN — WATER 40 MG: 1 INJECTION INTRAMUSCULAR; INTRAVENOUS; SUBCUTANEOUS at 05:46

## 2025-01-13 RX ADMIN — WATER 2.5 MG: 1 INJECTION INTRAMUSCULAR; INTRAVENOUS; SUBCUTANEOUS at 03:21

## 2025-01-13 RX ADMIN — LEVOTHYROXINE SODIUM 75 MCG: 0.05 TABLET ORAL at 05:43

## 2025-01-13 NOTE — CARE COORDINATION
1/10/25  9:04 AM    Care Management Progress Note    Reason for Admission:   Respiratory distress [R06.03]  Elevated troponin [R79.89]  COPD exacerbation (HCC) [J44.1]  Hypertensive emergency [I16.1]  Acute hypoxic respiratory failure [J96.01]  COPD (chronic obstructive pulmonary disease) (HCC) [J44.9]         Patient Admission Status: Inpatient  RUR: 14%  Hospitalization in the last 30 days (Readmission):  No        Transition of care plan:  Ongoing medical management: PT/OT consulted  Discharge plan: TBD- PT recommending HH vs None.   Discharge plan communicated with patient and/or discharge caregiver: Yes    Date 1st IMM letter given: 1/9/25  Outpatient follow-up.  Transport at discharge: Family    CM following for dc needs. Had been on 2L of O2 yeserday, now on room air. PT completed home oximetry testing yesterday and patient did not qualify for O2.     CHONG Osorio  [facility]      Available via CallFire      
Care Management Progress Note    Reason for Admission:   Respiratory distress [R06.03]  Elevated troponin [R79.89]  COPD exacerbation (HCC) [J44.1]  Hypertensive emergency [I16.1]  Acute hypoxic respiratory failure [J96.01]  COPD (chronic obstructive pulmonary disease) (HCC) [J44.9]         Patient Admission Status: Inpatient  RUR: 15% Moderate  Hospitalization in the last 30 days (Readmission):  Yes        Transition of care plan:  Patient DC back to [facility] today with Dami[facility] .   Discharge plan communicated with patient and/or discharge caregiver: Yes    Date 1st IMM letter given: 1/9/25  Outpatient follow-up: PCP, Specialist  Transport at discharge: Patient's daughter Justine has confirmed, Justine's SONAM Yen will transport patient home today around 3pm. Nurse informed.       Lauren Riley, MS  [phone]  
No   Patient's  Info Family has been driving her lately   Mode of Transportation Car   Occupation Retired   Discharge Planning   Type of Residence Apartment   Living Arrangements Alone   Current Services Prior To Admission Other (Comment)  (has a nebulizer)   Potential Assistance Needed N/A   Type of Home Care Services None   Patient expects to be discharged to: Independent living facility   Services At/After Discharge   Mode of Transport at Discharge Other (see comment)  (Family)     Comments: CM reviewed EMR and met with patient at bedside to complete the initial evaluation. Confirmed charted demographics. Patient resides at [facility] in an independent living apartment. She is normally independent with all of her ADLs and IADLS. She has not been driving due to pain in her leg. She has no history of home health services but has been to OP therapy in the past.     CM following for dc needs.    Discharge Concerns: []Yes [x]No []Unknown   Describe:    Financial concerns/barriers: []Yes, explain: [x]No []Unknown/Not discussed  __________________________________________________________________________    Insurer:   Active Insurance as of 1/9/2025       Primary Coverage       Payor Plan Insurance Group Employer/Plan Group    MEDICARE MEDICARE PART A AND B        Payor Address Payor Phone Number Payor Fax Number Effective Dates    [address] [address] [phone]  7/1/1997 - None Entered    [facility] [id number]         Subscriber Name Subscriber Birth Date Member ID       BREANN GRAMAJO 10/8/1930 [id number]               Secondary Coverage       Payor Plan Insurance Group Employer/Plan Group    [facility] MEDICARE SUPP [id number]       Payor Address Payor Phone Number Payor Fax Number Effective Dates    [address] [address] [phone]  11/1/2016 - None Entered    [facility] [address]         Subscriber Name Subscriber Birth Date Member ID       BREANN GRAMAJO 10/8/1930 [id number]                     PCP: Jessie Lord,

## 2025-01-13 NOTE — SIGNIFICANT EVENT
Pt with code george due to restlessness, aggitation and aggression towards staff. Will provide IM zyprexa now for sedation.

## 2025-01-13 NOTE — PROGRESS NOTES
Nurse handed patient a copy of discharge instructions which have been read and explained to ina. New medications read and explained. Patient verbalized understanding. Patient aware that precriptions have been eletronically sent to pharmacy. Opportunity for questions and clarification offered.Removed patients IV access with no complications,VS stable, and patient sent with all belongings.

## 2025-01-13 NOTE — DISCHARGE SUMMARY
Discharge Summary   Please note that this dictation was completed with [employer], the computer voice recognition software.  Quite often unanticipated grammatical, syntax, homophones, and other interpretive errors are inadvertently transcribed by the computer software.  Please disregard these errors.  Please excuse any errors that have escaped final proofreading.    PATIENT ID: Zoey Sneed  MRN: [id number]   YOB: 1930    DATE OF ADMISSION: 1/9/2025  2:19 AM    DATE OF DISCHARGE: 1/13/2025  PRIMARY CARE PROVIDER: Jessie Lrod MD         ATTENDING PHYSICIAN: ROSE GLASS MD  DISCHARGING PROVIDER: ROSE GLASS MD       CONSULTATIONS: None    PROCEDURES/SURGERIES: * No surgery found *    ADMITTING HPI from excerpted H&P    89 y.o. female with a past medical history of A-fib, COPD, hypothyroidism, hypertension, who presented to the emergency room due to worsening shortness of breath, and is being admitted for COPD exacerbation.     Patient reports she has been having several days of worsening shortness of breath.  She states that last week her cough got worse than baseline, and she had increased sputum production, yellow to green in color.  She states she has had worsening shortness of breath over the past few days.  She states she is always slightly short of breath, but this was significantly worse.     In the ER at [place] patient was hypoxic, and required BiPAP.  She was transferred to [facility] ICU for further care.        HOSPITAL COURSE & DISCHARGE DIAGNOSIS/ PLAN:       COPD with acute exacerbation  Acute hypoxic respiratory failure  COPD exacerbation, acute, POA  Expiratory stridor  Increased cough, dyspnea, sputum production  Likely triggered by either URI or seasonal changes.  Treated with steroids nebs and azithromycin.  CT neck ordered for possible stridor did not show any obstruction.  Discharged with steroid taper and nebs    Intermittent agitation  Likely triggered by steroids,

## 2025-01-13 NOTE — DISCHARGE INSTRUCTIONS
You came to the hospital with a COPD Attack likely as a result of seasonal changes or minor respiratory tract infection. COVID and flu test were negative.  You were treated with steroids and breathing treatments with improvement in your condition.  You also had a CT scan of your neck that did not show any mass or abnormalities in your larynx or vocal cords.  Please complete the course of steroids and breathing treatments and follow-up with your primary care physician.

## 2025-01-13 NOTE — PLAN OF CARE
Problem: Occupational Therapy - Adult  Goal: By Discharge: Performs self-care activities at highest level of function for planned discharge setting.  See evaluation for individualized goals.  Description: FUNCTIONAL STATUS PRIOR TO ADMISSION:  Patient was independent with ADLs and functional mobility/ ambulatory without AD in apartment and rollator for further distances. No recent falls.    HOME SUPPORT: Patient resided in [facility] apartment alone    Occupational Therapy Goals:  Initiated 1/9/2025  1.  Patient will perform grooming standing at sink with Modified [place] within 7 day(s).  2.  Patient will perform bathing with Modified [place] within 7 day(s).  3.  Patient will perform lower body dressing with Modified [place] within 7 day(s).  4.  Patient will perform toilet transfers with Modified [place]  within 7 day(s).  5.  Patient will perform all aspects of toileting with Modified [place] within 7 day(s).  6.  Patient will utilize energy conservation techniques during functional activities without cuing within 7 day(s).  7.  Patient will utilize fall prevention techniques during functional activities without cuing within 7 day(s).      Outcome: Progressing     OCCUPATIONAL THERAPY TREATMENT  Patient: Zoey Sneed (89 y.o. female)  Date: 1/10/2025  Primary Diagnosis: Respiratory distress [R06.03]  Elevated troponin [R79.89]  COPD exacerbation (HCC) [J44.1]  Hypertensive emergency [I16.1]  Acute hypoxic respiratory failure [J96.01]  COPD (chronic obstructive pulmonary disease) (HCC) [J44.9]       Precautions: fall  Chart, occupational therapy assessment, plan of care, and goals were reviewed.    ASSESSMENT  Patient is progressing in OT goals.  She presents today A&Ox4 with good insight and safety awareness, pleasant and participatory.  She was eager to mobilize OOB and maintained SPO2 >94% on room air with very mild CLEMENTE. Her AROM/ strength/ coordination is WFL for ADLs.  She performed the 
  Problem: Occupational Therapy - Adult  Goal: By Discharge: Performs self-care activities at highest level of function for planned discharge setting.  See evaluation for individualized goals.  Description: FUNCTIONAL STATUS PRIOR TO ADMISSION:  Patient was independent with ADLs and functional mobility/ ambulatory without AD in apartment and rollator for further distances. No recent falls.    HOME SUPPORT: Patient resided in [facility] apartment alone    Occupational Therapy Goals:  Initiated 1/9/2025  1.  Patient will perform grooming standing at sink with Modified [place] within 7 day(s).  2.  Patient will perform bathing with Modified [place] within 7 day(s).  3.  Patient will perform lower body dressing with Modified [place] within 7 day(s).  4.  Patient will perform toilet transfers with Modified [place]  within 7 day(s).  5.  Patient will perform all aspects of toileting with Modified [place] within 7 day(s).  6.  Patient will utilize energy conservation techniques during functional activities without cuing within 7 day(s).  7.  Patient will utilize fall prevention techniques during functional activities without cuing within 7 day(s).      Outcome: Progressing    OCCUPATIONAL THERAPY TREATMENT  Patient: Zoey Sneed (89 y.o. female)  Date: 1/13/2025  Primary Diagnosis: Respiratory distress [R06.03]  Elevated troponin [R79.89]  COPD exacerbation (HCC) [J44.1]  Hypertensive emergency [I16.1]  Acute hypoxic respiratory failure [J96.01]  COPD (chronic obstructive pulmonary disease) (HCC) [J44.9]       Precautions:                  Chart, occupational therapy assessment, plan of care, and goals were reviewed.    ASSESSMENT  Patient continues to benefit from skilled OT services and is progressing towards goals. Patient received in bed and agreeable to participate in therapy this session. Patient requires SBA with bed mobility and mobilized with RW with CGA. Patient declined participation in ADLs this 
  Problem: Physical Therapy - Adult  Goal: By Discharge: Performs mobility at highest level of function for planned discharge setting.  See evaluation for individualized goals.  Description: FUNCTIONAL STATUS PRIOR TO ADMISSION: Patient was independent and active, typically without use of DME.  She sometimes used her  's rollator for longer distance ambulation to the [facility] main living area or to transport items.    HOME SUPPORT PRIOR TO ADMISSION: The patient lived alone at [facility] apartments.    Physical Therapy Goals  Initiated 2025  1.  Patient will move from supine to sit and sit to supine, scoot up and down, and roll side to side in bed with modified independence within 7 day(s).    2.  Patient will perform sit to stand with modified independence within 7 day(s).  3.  Patient will transfer from bed to chair and chair to bed with modified independence using the least restrictive device within 7 day(s).  4.  Patient will ambulate with modified independence for 150 feet with the least restrictive device within 7 day(s).     Outcome: Progressing   PHYSICAL THERAPY EVALUATION    Patient: Zoey Sneed (89 y.o. female)  Date: 2025  Primary Diagnosis: Respiratory distress [R06.03]  Elevated troponin [R79.89]  COPD exacerbation ([facility]) [J44.1]  Hypertensive emergency [I16.1]  Acute hypoxic respiratory failure [J96.01]  COPD (chronic obstructive pulmonary disease) ([facility]) [J44.9]       Precautions:                        ASSESSMENT :   DEFICITS/IMPAIRMENTS:   The patient is limited by decreased functional mobility, strength, activity tolerance, endurance, balance, and mild gait dysfunction s/p admission for COPD exacerbation. Patient received in ICU on 2L O2 with tons of blankets due to feeling cold (room thermostat stuck, nurse leader made aware for work order). Completed O2 assessment with activity on RA with sats remaining in mid 90s at rest and no lower than 93% on RA with activity.  Needed light 
  Problem: Physical Therapy - Adult  Goal: By Discharge: Performs mobility at highest level of function for planned discharge setting.  See evaluation for individualized goals.  Description: FUNCTIONAL STATUS PRIOR TO ADMISSION: Patient was independent and active, typically without use of DME.  She sometimes used her  's rollator for longer distance ambulation to the [facility] main living area or to transport items.    HOME SUPPORT PRIOR TO ADMISSION: The patient lived alone at [facility] apartments.    Physical Therapy Goals  Initiated 2025  1.  Patient will move from supine to sit and sit to supine, scoot up and down, and roll side to side in bed with modified independence within 7 day(s).    2.  Patient will perform sit to stand with modified independence within 7 day(s).  3.  Patient will transfer from bed to chair and chair to bed with modified independence using the least restrictive device within 7 day(s).  4.  Patient will ambulate with modified independence for 150 feet with the least restrictive device within 7 day(s).     1/10/2025 1427 by Pelon Ramos, PT  Outcome: Progressing   PHYSICAL THERAPY TREATMENT    Patient: Zeoy Sneed (89 y.o. female)  Date: 1/10/2025  Diagnosis: Respiratory distress [R06.03]  Elevated troponin [R79.89]  COPD exacerbation (HCC) [J44.1]  Hypertensive emergency [I16.1]  Acute hypoxic respiratory failure [J96.01]  COPD (chronic obstructive pulmonary disease) (HCC) [J44.9] Acute hypoxic respiratory failure      Precautions:                        ASSESSMENT:  Patient continues to benefit from skilled PT services and is progressing towards goals. Communicated with nurse cleared for therapy. Patient supine on bed when received. Mobilized patient with rolling walker min assist. Sit on the edge of bed , ambulate in the room and out on the ICU hallway min assist need some verbal cues to always keep her hands of the rolling walker at all times for safety. Noted 
  Problem: Physical Therapy - Adult  Goal: By Discharge: Performs mobility at highest level of function for planned discharge setting.  See evaluation for individualized goals.  Description: FUNCTIONAL STATUS PRIOR TO ADMISSION: Patient was independent and active, typically without use of DME.  She sometimes used her  's rollator for longer distance ambulation to the [facility] main living area or to transport items.    HOME SUPPORT PRIOR TO ADMISSION: The patient lived alone at [facility] apartments.    Physical Therapy Goals  Initiated 2025  1.  Patient will move from supine to sit and sit to supine, scoot up and down, and roll side to side in bed with modified independence within 7 day(s).    2.  Patient will perform sit to stand with modified independence within 7 day(s).  3.  Patient will transfer from bed to chair and chair to bed with modified independence using the least restrictive device within 7 day(s).  4.  Patient will ambulate with modified independence for 150 feet with the least restrictive device within 7 day(s).     Outcome: Progressing   PHYSICAL THERAPY TREATMENT    Patient: Zoey Sneed (89 y.o. female)  Date: 2025  Diagnosis: Respiratory distress [R06.03]  Elevated troponin [R79.89]  COPD exacerbation (HCC) [J44.1]  Hypertensive emergency [I16.1]  Acute hypoxic respiratory failure [J96.01]  COPD (chronic obstructive pulmonary disease) (HCC) [J44.9] Acute hypoxic respiratory failure      Precautions:                        ASSESSMENT:  Patient continues to benefit from skilled PT services and is progressing towards goals. Pt received in bed, alert, cooperative. Tolerated gait training on unit with RW and CGA for balance. Pt presents with mild CLEMENTE, SpO2 94% with activity on RA. Agreed to sit in chair at end of session; alarm activated.    Pt likely nearing functional baseline. Will benefit from con't PT for activity progression and balance/ gait training to facilitate safe return 
Clicks\"                                                       Daily Activity Inpatient Short Form  AM-PAC Daily Activity - Inpatient   How much help is needed for putting on and taking off regular lower body clothing?: A Little  How much help is needed for bathing (which includes washing, rinsing, drying)?: A Little  How much help is needed for toileting (which includes using toilet, bedpan, or urinal)?: A Little  How much help is needed for putting on and taking off regular upper body clothing?: None  How much help is needed for taking care of personal grooming?: None  How much help for eating meals?: None  [facility] Inpatient Daily Activity Raw Score: 21  AM-PAC Inpatient ADL T-Scale Score : 44.27  ADL Inpatient CMS 0-100% Score: 32.79  ADL Inpatient CMS G-Code Modifier : CJ     Interpretation of Tool:  Represents clinically-significant functional categories (i.e. Activities of daily living).  Cutoff score 39.4 (19) correlates to a good likelihood of discharging home versus a facility  Kristina Amado, aNbila Galarza, Rj Brown, Joanie Silva, Shaggy Jain, Garrett Amado, AM-PAC “6-Clicks” Functional Assessment Scores Predict Acute Care Hospital Discharge Destination, Physical Therapy, Volume 94, Issue 9, 1 September 2014, Pages 1760-1734, [email or web address]       Pain Rating:  Patient did not report pain    Activity Tolerance:   Good and SpO2 stable on room air    After treatment:   Patient left in no apparent distress sitting up in chair, Call bell within reach, and Bed/ chair alarm activated    COMMUNICATION/EDUCATION:   The patient's plan of care was discussed with: physical therapist and registered nurse         Thank you for this referral.  Silvio Paul OT  Minutes: 23    Occupational Therapy Evaluation Charge Determination   History Examination Decision-Making   LOW Complexity : Brief history review  MEDIUM Complexity: 3-5 Performance deficits relating to physical,

## 2025-01-13 NOTE — PROGRESS NOTES
Physician Progress Note      PATIENT:               BREANN GRAMAJO  [facility] #:                  [id number]  :                       10/8/1930  ADMIT DATE:       2025 2:19 AM  DISCH DATE:        2025 2:58 PM  RESPONDING  PROVIDER #:        Patel Cabral MD          QUERY TEXT:    Good afternoon.    Patient admitted with acute COPD exacerbation, noted to have atrial   fibrillation and is maintained on Eliquis.    If possible, please document in progress notes and discharge summary if you   are evaluating and/or treating any of the following:?  ?  The medical record reflects the following:    Risk Factors: 89 yr old female, HTN    Clinical Indicators: from  DC Summary: \"A-fib Continue aspirin, apixaban   Continue metoprolol\"    Treatment: Eliquis    Thank you,  Felicita Giordano RN, CDI  Options provided:  -- Secondary hypercoagulable state in a patient with atrial fibrillation  -- Other - I will add my own diagnosis  -- Disagree - Not applicable / Not valid  -- Disagree - Clinically unable to determine / Unknown  -- Refer to Clinical Documentation Reviewer    PROVIDER RESPONSE TEXT:    This patient has secondary hypercoagulable state in a patient with atrial   fibrillation.    Query created by: Felicita Giordano on 2025 2:44 PM      Electronically signed by:  Patel Cabral MD 2025 3:13 PM           Dispo: home pending Endocrine recs

## 2025-01-13 NOTE — PROGRESS NOTES
Writer recieved report from outgoing nurse that pt complained that steroid gives her mood disorder and being mean to people. I went to pts's room now and asked her and she confirmed that steroid messes her mood up and she said that her daughter do not want her to take the steriod but she does not have have a choice becaue she wants to get better with her breathing and do not know if there is any other alternative for her. Pls advise

## 2025-01-14 LAB
BACTERIA SPEC CULT: NORMAL
BACTERIA SPEC CULT: NORMAL
SERVICE CMNT-IMP: NORMAL
SERVICE CMNT-IMP: NORMAL

## 2025-01-30 ENCOUNTER — HOSPITAL ENCOUNTER (EMERGENCY)
Facility: HOSPITAL | Age: 89
Discharge: HOME | End: 2025-01-31
Attending: EMERGENCY MEDICINE
Payer: MEDICARE

## 2025-01-30 DIAGNOSIS — R33.9 URINARY RETENTION: Primary | ICD-10-CM

## 2025-01-30 DIAGNOSIS — N39.0 URINARY TRACT INFECTION WITHOUT HEMATURIA, SITE UNSPECIFIED: ICD-10-CM

## 2025-01-30 DIAGNOSIS — I10 ESSENTIAL HYPERTENSION: ICD-10-CM

## 2025-01-30 PROCEDURE — 51798 US URINE CAPACITY MEASURE: CPT

## 2025-01-30 PROCEDURE — 87086 URINE CULTURE/COLONY COUNT: CPT

## 2025-01-30 PROCEDURE — 81001 URINALYSIS AUTO W/SCOPE: CPT

## 2025-01-30 PROCEDURE — 99283 EMERGENCY DEPT VISIT LOW MDM: CPT

## 2025-01-30 ASSESSMENT — PAIN - FUNCTIONAL ASSESSMENT: PAIN_FUNCTIONAL_ASSESSMENT: NONE - DENIES PAIN

## 2025-01-31 ENCOUNTER — TELEPHONE (OUTPATIENT)
Age: 89
End: 2025-01-31

## 2025-01-31 VITALS
TEMPERATURE: 98 F | HEIGHT: 63 IN | RESPIRATION RATE: 16 BRPM | OXYGEN SATURATION: 95 % | SYSTOLIC BLOOD PRESSURE: 191 MMHG | HEART RATE: 96 BPM | BODY MASS INDEX: 22.66 KG/M2 | DIASTOLIC BLOOD PRESSURE: 104 MMHG | WEIGHT: 127.9 LBS

## 2025-01-31 LAB
APPEARANCE UR: CLEAR
BACTERIA URNS QL MICRO: ABNORMAL /HPF
BILIRUB UR QL: NEGATIVE
COLOR UR: ABNORMAL
EPITH CASTS URNS QL MICRO: ABNORMAL /LPF
GLUCOSE UR STRIP.AUTO-MCNC: NEGATIVE MG/DL
HGB UR QL STRIP: NEGATIVE
KETONES UR QL STRIP.AUTO: NEGATIVE MG/DL
LEUKOCYTE ESTERASE UR QL STRIP.AUTO: ABNORMAL
NITRITE UR QL STRIP.AUTO: NEGATIVE
PH UR STRIP: 6 (ref 5–8)
PROT UR STRIP-MCNC: NEGATIVE MG/DL
RBC #/AREA URNS HPF: ABNORMAL /HPF
SP GR UR REFRACTOMETRY: 1.01 (ref 1–1.03)
UROBILINOGEN UR QL STRIP.AUTO: 0.2 EU/DL (ref 0.2–1)
WBC URNS QL MICRO: ABNORMAL /HPF (ref 0–4)

## 2025-01-31 PROCEDURE — 6370000000 HC RX 637 (ALT 250 FOR IP): Performed by: EMERGENCY MEDICINE

## 2025-01-31 RX ORDER — ATORVASTATIN CALCIUM 20 MG/1
20 TABLET, FILM COATED ORAL ONCE
Status: COMPLETED | OUTPATIENT
Start: 2025-01-31 | End: 2025-01-31

## 2025-01-31 RX ORDER — ALPRAZOLAM 0.5 MG
0.5 TABLET ORAL NIGHTLY PRN
Status: DISCONTINUED | OUTPATIENT
Start: 2025-01-31 | End: 2025-01-31 | Stop reason: HOSPADM

## 2025-01-31 RX ORDER — NITROFURANTOIN 25; 75 MG/1; MG/1
100 CAPSULE ORAL 2 TIMES DAILY
Qty: 14 CAPSULE | Refills: 0 | Status: SHIPPED | OUTPATIENT
Start: 2025-01-31 | End: 2025-02-07

## 2025-01-31 RX ORDER — METOPROLOL SUCCINATE 25 MG/1
25 TABLET, EXTENDED RELEASE ORAL ONCE
Status: COMPLETED | OUTPATIENT
Start: 2025-01-31 | End: 2025-01-31

## 2025-01-31 RX ORDER — LISINOPRIL 20 MG/1
20 TABLET ORAL ONCE
Status: COMPLETED | OUTPATIENT
Start: 2025-01-31 | End: 2025-01-31

## 2025-01-31 RX ORDER — HYDRALAZINE HYDROCHLORIDE 20 MG/ML
10 INJECTION INTRAMUSCULAR; INTRAVENOUS ONCE
Status: DISCONTINUED | OUTPATIENT
Start: 2025-01-31 | End: 2025-01-31

## 2025-01-31 RX ORDER — NITROFURANTOIN 25; 75 MG/1; MG/1
100 CAPSULE ORAL ONCE
Status: COMPLETED | OUTPATIENT
Start: 2025-01-31 | End: 2025-01-31

## 2025-01-31 RX ADMIN — LISINOPRIL 20 MG: 20 TABLET ORAL at 00:47

## 2025-01-31 RX ADMIN — METOPROLOL SUCCINATE 25 MG: 25 TABLET, FILM COATED, EXTENDED RELEASE ORAL at 00:47

## 2025-01-31 RX ADMIN — ATORVASTATIN CALCIUM 20 MG: 20 TABLET, FILM COATED ORAL at 00:47

## 2025-01-31 RX ADMIN — ALPRAZOLAM 0.5 MG: 0.5 TABLET ORAL at 00:47

## 2025-01-31 RX ADMIN — NITROFURANTOIN (MONOHYDRATE/MACROCRYSTALS) 100 MG: 25; 75 CAPSULE ORAL at 00:46

## 2025-01-31 NOTE — TELEPHONE ENCOUNTER
Pt was seen at [facility] on 1/30 for urinary retention cath was placed she needed to follow up with dr. Groves

## 2025-01-31 NOTE — ED PROVIDER NOTES
[facility] EMERGENCY DEPARTMENT  EMERGENCY DEPARTMENT ENCOUNTER      Pt Name: Zoey Sneed  MRN: [id number]  Birthdate 10/8/1930  Date of evaluation: 1/30/2025  Provider: Shawn Tirado MD    CHIEF COMPLAINT       Chief Complaint   Patient presents with    Urinary Retention       EMERGENCY DEPARTMENT COURSE and DIFFERENTIAL DIAGNOSIS/MDM:   Medical Decision Making  89-year-old female brought into the ER with report of difficulty urinating and having abdominal pain in the lower abdomen.  Patient has history of urinary tract infections as well as history of A-fib , Hypertension, anxiety.  Patient reports having difficulty urinating throughout the day and has gotten to the point where only a small dribbles comes out.  Having worsening pain in the suprapubic region with bloating distention.  Denies any fevers or chills no URI-like symptoms.  On arrival patient has abdominal distention in the suprapubic region with bladder scan showing greater than 500 cc of urine with patient unable to void.  King catheter placed with clear urine obtained.  After placement patient pain improved and resolved.  Urine with signs concerning for possible infection we will send urine culture.  Reviewed previous urine cultures and shows start patient on Macrobid pending urine culture results.  Patient does have a urologist Dr. Modi advised that she follow-up with Dr. Modi.  Patient given her nighttime blood pressure medications that she had not taken yet this evening.  Patient not having any chest pain or shortness of breath or headache.  Not having symptoms concerning for ACS no need for additional labs.  Patient stable for discharge    Amount and/or Complexity of Data Reviewed  Labs: ordered. Decision-making details documented in ED Course.    Risk  Prescription drug management.            REASSESSMENT     ED Course as of 01/31/25 0307   Thu Jan 30, 2025   2329 558ml on bladder scan. Unable to void [ZD]      ED Course User Index  [ZD]

## 2025-01-31 NOTE — TELEPHONE ENCOUNTER
She does not empty her bladder.  She saw Yaneli with 450 residual in November.  In ER had 500 cc retention.      She needs to keep javier for 1 week to help drain bladder.  She needs to take her bethanechol that Dr. Groves prescribed her as well.     She can come see me in [place] on Thursday for a voiding trial or javier removal if we don't have saline.

## 2025-01-31 NOTE — ED TRIAGE NOTES
Patient presents to ED by ems from home, a&ox3, no acute distress, breaths even and unlabored c/o urinary retention today, had an episode of abdominal pain yesterday but currently doesn't have pain. Reports has been drinking a lot of water but very minimal urine coming out. Is followed by Dr Modi for urology. She called her PCP today and was told to come to the ER.     BP elevated upon arrival, pt reports she is due for her BP meds now.

## 2025-02-02 LAB
BACTERIA SPEC CULT: ABNORMAL
BACTERIA SPEC CULT: ABNORMAL
CC UR VC: ABNORMAL
SERVICE CMNT-IMP: ABNORMAL

## 2025-02-03 ENCOUNTER — HOSPITAL ENCOUNTER (INPATIENT)
Facility: HOSPITAL | Age: 89
LOS: 2 days | Discharge: HOME HEALTH | DRG: 190 | End: 2025-02-05
Attending: EMERGENCY MEDICINE | Admitting: STUDENT IN AN ORGANIZED HEALTH CARE EDUCATION/TRAINING PROGRAM
Payer: MEDICARE

## 2025-02-03 ENCOUNTER — APPOINTMENT (OUTPATIENT)
Facility: HOSPITAL | Age: 89
DRG: 190 | End: 2025-02-03
Payer: MEDICARE

## 2025-02-03 DIAGNOSIS — J44.1 COPD EXACERBATION (HCC): Primary | ICD-10-CM

## 2025-02-03 DIAGNOSIS — J96.90 RESPIRATORY FAILURE, UNSPECIFIED CHRONICITY, UNSPECIFIED WHETHER WITH HYPOXIA OR HYPERCAPNIA: ICD-10-CM

## 2025-02-03 DIAGNOSIS — R06.02 SHORTNESS OF BREATH: ICD-10-CM

## 2025-02-03 LAB
ALBUMIN SERPL-MCNC: 4.2 G/DL (ref 3.5–5.2)
ALBUMIN/GLOB SERPL: 1.8 (ref 1.1–2.2)
ALP SERPL-CCNC: 102 U/L (ref 35–104)
ALT SERPL-CCNC: 34 U/L (ref 10–35)
ANION GAP SERPL CALC-SCNC: 11 MMOL/L (ref 2–12)
AST SERPL-CCNC: 35 U/L (ref 10–35)
BASE DEFICIT BLD-SCNC: 3.4 MMOL/L
BASOPHILS # BLD: 0.04 K/UL (ref 0–0.1)
BASOPHILS NFR BLD: 0.5 % (ref 0–1)
BILIRUB SERPL-MCNC: 0.6 MG/DL (ref 0.2–1)
BUN SERPL-MCNC: 14 MG/DL (ref 8–23)
BUN/CREAT SERPL: 25 (ref 12–20)
CALCIUM SERPL-MCNC: 9.2 MG/DL (ref 8.2–9.6)
CHLORIDE SERPL-SCNC: 101 MMOL/L (ref 98–107)
CO2 SERPL-SCNC: 25 MMOL/L (ref 22–29)
COMMENT:: NORMAL
CREAT SERPL-MCNC: 0.57 MG/DL (ref 0.5–0.9)
DIFFERENTIAL METHOD BLD: ABNORMAL
EOSINOPHIL # BLD: 0.57 K/UL (ref 0–0.4)
EOSINOPHIL NFR BLD: 7.6 % (ref 0–7)
ERYTHROCYTE [DISTWIDTH] IN BLOOD BY AUTOMATED COUNT: 14.4 % (ref 11.5–14.5)
GLOBULIN SER CALC-MCNC: 2.4 G/DL (ref 2–4)
GLUCOSE SERPL-MCNC: 116 MG/DL (ref 65–100)
HCO3 BLD-SCNC: 21.7 MMOL/L (ref 21–28)
HCT VFR BLD AUTO: 41.9 % (ref 35–47)
HGB BLD-MCNC: 14 G/DL (ref 11.5–16)
IMM GRANULOCYTES # BLD AUTO: 0.03 K/UL (ref 0–0.04)
IMM GRANULOCYTES NFR BLD AUTO: 0.4 % (ref 0–0.5)
LACTATE BLD-SCNC: 0.97 MMOL/L (ref 0.4–2)
LACTATE SERPL-SCNC: 0.8 MMOL/L (ref 0.4–2)
LYMPHOCYTES # BLD: 1.35 K/UL (ref 0.8–3.5)
LYMPHOCYTES NFR BLD: 18 % (ref 12–49)
MAGNESIUM SERPL-MCNC: 1.8 MG/DL (ref 1.7–2.3)
MCH RBC QN AUTO: 32.5 PG (ref 26–34)
MCHC RBC AUTO-ENTMCNC: 33.4 G/DL (ref 30–36.5)
MCV RBC AUTO: 97.2 FL (ref 80–99)
MONOCYTES # BLD: 0.61 K/UL (ref 0–1)
MONOCYTES NFR BLD: 8.1 % (ref 5–13)
NEUTS SEG # BLD: 4.92 K/UL (ref 1.8–8)
NEUTS SEG NFR BLD: 65.4 % (ref 32–75)
NRBC # BLD: 0 K/UL (ref 0–0.01)
NRBC BLD-RTO: 0 PER 100 WBC
NT PRO BNP: 775 PG/ML
PCO2 BLD: 38.6 MMHG (ref 35–48)
PH BLD: 7.36 (ref 7.35–7.45)
PLATELET # BLD AUTO: 205 K/UL (ref 150–400)
PMV BLD AUTO: 9.6 FL (ref 8.9–12.9)
PO2 BLD: 117 MMHG (ref 83–108)
POTASSIUM SERPL-SCNC: 4.5 MMOL/L (ref 3.5–5.1)
PROT SERPL-MCNC: 6.6 G/DL (ref 6.4–8.3)
RBC # BLD AUTO: 4.31 M/UL (ref 3.8–5.2)
SAO2 % BLD: 98.4 % (ref 92–97)
SERVICE CMNT-IMP: ABNORMAL
SODIUM SERPL-SCNC: 137 MMOL/L (ref 136–145)
SPECIMEN HOLD: NORMAL
SPECIMEN TYPE: ABNORMAL
TROPONIN T SERPL HS-MCNC: 15 NG/L (ref 0–14)
TROPONIN T SERPL HS-MCNC: 15.7 NG/L (ref 0–14)
WBC # BLD AUTO: 7.5 K/UL (ref 3.6–11)

## 2025-02-03 PROCEDURE — 80053 COMPREHEN METABOLIC PANEL: CPT

## 2025-02-03 PROCEDURE — 6370000000 HC RX 637 (ALT 250 FOR IP)

## 2025-02-03 PROCEDURE — 1100000000 HC RM PRIVATE

## 2025-02-03 PROCEDURE — 6370000000 HC RX 637 (ALT 250 FOR IP): Performed by: EMERGENCY MEDICINE

## 2025-02-03 PROCEDURE — 96375 TX/PRO/DX INJ NEW DRUG ADDON: CPT

## 2025-02-03 PROCEDURE — 2500000003 HC RX 250 WO HCPCS: Performed by: EMERGENCY MEDICINE

## 2025-02-03 PROCEDURE — 82803 BLOOD GASES ANY COMBINATION: CPT

## 2025-02-03 PROCEDURE — 6370000000 HC RX 637 (ALT 250 FOR IP): Performed by: FAMILY MEDICINE

## 2025-02-03 PROCEDURE — 96365 THER/PROPH/DIAG IV INF INIT: CPT

## 2025-02-03 PROCEDURE — 83735 ASSAY OF MAGNESIUM: CPT

## 2025-02-03 PROCEDURE — 6360000002 HC RX W HCPCS: Performed by: EMERGENCY MEDICINE

## 2025-02-03 PROCEDURE — 85025 COMPLETE CBC W/AUTO DIFF WBC: CPT

## 2025-02-03 PROCEDURE — 84484 ASSAY OF TROPONIN QUANT: CPT

## 2025-02-03 PROCEDURE — 6360000002 HC RX W HCPCS: Performed by: FAMILY MEDICINE

## 2025-02-03 PROCEDURE — 83880 ASSAY OF NATRIURETIC PEPTIDE: CPT

## 2025-02-03 PROCEDURE — 82306 VITAMIN D 25 HYDROXY: CPT

## 2025-02-03 PROCEDURE — 2500000003 HC RX 250 WO HCPCS: Performed by: FAMILY MEDICINE

## 2025-02-03 PROCEDURE — 83605 ASSAY OF LACTIC ACID: CPT

## 2025-02-03 PROCEDURE — 2580000003 HC RX 258: Performed by: EMERGENCY MEDICINE

## 2025-02-03 PROCEDURE — 99285 EMERGENCY DEPT VISIT HI MDM: CPT

## 2025-02-03 PROCEDURE — 36415 COLL VENOUS BLD VENIPUNCTURE: CPT

## 2025-02-03 PROCEDURE — 93005 ELECTROCARDIOGRAM TRACING: CPT | Performed by: EMERGENCY MEDICINE

## 2025-02-03 PROCEDURE — 71045 X-RAY EXAM CHEST 1 VIEW: CPT

## 2025-02-03 PROCEDURE — 94640 AIRWAY INHALATION TREATMENT: CPT

## 2025-02-03 RX ORDER — MAGNESIUM SULFATE IN WATER 40 MG/ML
2000 INJECTION, SOLUTION INTRAVENOUS PRN
Status: DISCONTINUED | OUTPATIENT
Start: 2025-02-03 | End: 2025-02-05 | Stop reason: HOSPADM

## 2025-02-03 RX ORDER — ENOXAPARIN SODIUM 100 MG/ML
40 INJECTION SUBCUTANEOUS DAILY
Status: DISCONTINUED | OUTPATIENT
Start: 2025-02-04 | End: 2025-02-04

## 2025-02-03 RX ORDER — ALBUTEROL SULFATE 0.83 MG/ML
2.5 SOLUTION RESPIRATORY (INHALATION) EVERY 4 HOURS PRN
Status: DISCONTINUED | OUTPATIENT
Start: 2025-02-03 | End: 2025-02-05 | Stop reason: HOSPADM

## 2025-02-03 RX ORDER — IPRATROPIUM BROMIDE AND ALBUTEROL SULFATE 2.5; .5 MG/3ML; MG/3ML
SOLUTION RESPIRATORY (INHALATION)
Status: COMPLETED
Start: 2025-02-03 | End: 2025-02-03

## 2025-02-03 RX ORDER — SODIUM CHLORIDE 0.9 % (FLUSH) 0.9 %
5-40 SYRINGE (ML) INJECTION EVERY 12 HOURS SCHEDULED
Status: DISCONTINUED | OUTPATIENT
Start: 2025-02-03 | End: 2025-02-05 | Stop reason: HOSPADM

## 2025-02-03 RX ORDER — ATORVASTATIN CALCIUM 20 MG/1
20 TABLET, FILM COATED ORAL NIGHTLY
Status: DISCONTINUED | OUTPATIENT
Start: 2025-02-03 | End: 2025-02-05 | Stop reason: HOSPADM

## 2025-02-03 RX ORDER — DEXTROSE MONOHYDRATE 100 MG/ML
INJECTION, SOLUTION INTRAVENOUS CONTINUOUS PRN
Status: DISCONTINUED | OUTPATIENT
Start: 2025-02-03 | End: 2025-02-05 | Stop reason: HOSPADM

## 2025-02-03 RX ORDER — SODIUM CHLORIDE 9 MG/ML
INJECTION, SOLUTION INTRAVENOUS PRN
Status: DISCONTINUED | OUTPATIENT
Start: 2025-02-03 | End: 2025-02-05 | Stop reason: HOSPADM

## 2025-02-03 RX ORDER — METOPROLOL SUCCINATE 50 MG/1
50 TABLET, EXTENDED RELEASE ORAL DAILY
Status: DISCONTINUED | OUTPATIENT
Start: 2025-02-04 | End: 2025-02-05 | Stop reason: HOSPADM

## 2025-02-03 RX ORDER — POTASSIUM CHLORIDE 7.45 MG/ML
10 INJECTION INTRAVENOUS PRN
Status: DISCONTINUED | OUTPATIENT
Start: 2025-02-03 | End: 2025-02-05 | Stop reason: HOSPADM

## 2025-02-03 RX ORDER — ALPRAZOLAM 0.5 MG
0.5 TABLET ORAL NIGHTLY PRN
Status: DISCONTINUED | OUTPATIENT
Start: 2025-02-03 | End: 2025-02-05 | Stop reason: HOSPADM

## 2025-02-03 RX ORDER — BUDESONIDE 0.5 MG/2ML
0.5 INHALANT ORAL
Status: DISCONTINUED | OUTPATIENT
Start: 2025-02-03 | End: 2025-02-05 | Stop reason: HOSPADM

## 2025-02-03 RX ORDER — PROCHLORPERAZINE EDISYLATE 5 MG/ML
10 INJECTION INTRAMUSCULAR; INTRAVENOUS EVERY 6 HOURS PRN
Status: DISCONTINUED | OUTPATIENT
Start: 2025-02-03 | End: 2025-02-05 | Stop reason: HOSPADM

## 2025-02-03 RX ORDER — IPRATROPIUM BROMIDE AND ALBUTEROL SULFATE 2.5; .5 MG/3ML; MG/3ML
1 SOLUTION RESPIRATORY (INHALATION)
Status: DISCONTINUED | OUTPATIENT
Start: 2025-02-04 | End: 2025-02-05

## 2025-02-03 RX ORDER — ASPIRIN 81 MG/1
81 TABLET, CHEWABLE ORAL DAILY
Status: DISCONTINUED | OUTPATIENT
Start: 2025-02-04 | End: 2025-02-05 | Stop reason: HOSPADM

## 2025-02-03 RX ORDER — ACETAMINOPHEN 325 MG/1
650 TABLET ORAL EVERY 6 HOURS PRN
Status: DISCONTINUED | OUTPATIENT
Start: 2025-02-03 | End: 2025-02-05 | Stop reason: HOSPADM

## 2025-02-03 RX ORDER — LEVOTHYROXINE SODIUM 75 UG/1
75 TABLET ORAL
Status: DISCONTINUED | OUTPATIENT
Start: 2025-02-04 | End: 2025-02-05 | Stop reason: HOSPADM

## 2025-02-03 RX ORDER — ACETAMINOPHEN 650 MG/1
650 SUPPOSITORY RECTAL EVERY 6 HOURS PRN
Status: DISCONTINUED | OUTPATIENT
Start: 2025-02-03 | End: 2025-02-05 | Stop reason: HOSPADM

## 2025-02-03 RX ORDER — ALBUTEROL SULFATE 0.83 MG/ML
2.5 SOLUTION RESPIRATORY (INHALATION) ONCE
Status: COMPLETED | OUTPATIENT
Start: 2025-02-03 | End: 2025-02-03

## 2025-02-03 RX ORDER — POLYETHYLENE GLYCOL 3350 17 G/17G
17 POWDER, FOR SOLUTION ORAL DAILY PRN
Status: DISCONTINUED | OUTPATIENT
Start: 2025-02-03 | End: 2025-02-05 | Stop reason: HOSPADM

## 2025-02-03 RX ORDER — POTASSIUM CHLORIDE 750 MG/1
40 TABLET, EXTENDED RELEASE ORAL PRN
Status: DISCONTINUED | OUTPATIENT
Start: 2025-02-03 | End: 2025-02-05 | Stop reason: HOSPADM

## 2025-02-03 RX ORDER — IPRATROPIUM BROMIDE AND ALBUTEROL SULFATE 2.5; .5 MG/3ML; MG/3ML
1 SOLUTION RESPIRATORY (INHALATION)
Status: COMPLETED | OUTPATIENT
Start: 2025-02-03 | End: 2025-02-03

## 2025-02-03 RX ORDER — LISINOPRIL 20 MG/1
40 TABLET ORAL DAILY
Status: DISCONTINUED | OUTPATIENT
Start: 2025-02-04 | End: 2025-02-05 | Stop reason: HOSPADM

## 2025-02-03 RX ORDER — MIRTAZAPINE 15 MG/1
15 TABLET, FILM COATED ORAL NIGHTLY
Status: DISCONTINUED | OUTPATIENT
Start: 2025-02-03 | End: 2025-02-05 | Stop reason: HOSPADM

## 2025-02-03 RX ORDER — ONDANSETRON 4 MG/1
4 TABLET, ORALLY DISINTEGRATING ORAL EVERY 8 HOURS PRN
Status: DISCONTINUED | OUTPATIENT
Start: 2025-02-03 | End: 2025-02-05 | Stop reason: HOSPADM

## 2025-02-03 RX ORDER — METHENAMINE HIPPURATE 1000 MG/1
1 TABLET ORAL 2 TIMES DAILY WITH MEALS
Status: DISCONTINUED | OUTPATIENT
Start: 2025-02-04 | End: 2025-02-05 | Stop reason: HOSPADM

## 2025-02-03 RX ORDER — SODIUM CHLORIDE 0.9 % (FLUSH) 0.9 %
5-40 SYRINGE (ML) INJECTION PRN
Status: DISCONTINUED | OUTPATIENT
Start: 2025-02-03 | End: 2025-02-05 | Stop reason: HOSPADM

## 2025-02-03 RX ORDER — ONDANSETRON 2 MG/ML
4 INJECTION INTRAMUSCULAR; INTRAVENOUS EVERY 6 HOURS PRN
Status: DISCONTINUED | OUTPATIENT
Start: 2025-02-03 | End: 2025-02-05 | Stop reason: HOSPADM

## 2025-02-03 RX ADMIN — APIXABAN 2.5 MG: 2.5 TABLET, FILM COATED ORAL at 23:53

## 2025-02-03 RX ADMIN — SODIUM CHLORIDE, PRESERVATIVE FREE 10 ML: 5 INJECTION INTRAVENOUS at 23:54

## 2025-02-03 RX ADMIN — IPRATROPIUM BROMIDE AND ALBUTEROL SULFATE 1 DOSE: 2.5; .5 SOLUTION RESPIRATORY (INHALATION) at 20:05

## 2025-02-03 RX ADMIN — ATORVASTATIN CALCIUM 20 MG: 20 TABLET, FILM COATED ORAL at 23:53

## 2025-02-03 RX ADMIN — MIRTAZAPINE 15 MG: 15 TABLET, FILM COATED ORAL at 23:53

## 2025-02-03 RX ADMIN — BUDESONIDE 500 MCG: 0.5 SUSPENSION RESPIRATORY (INHALATION) at 23:53

## 2025-02-03 RX ADMIN — ALPRAZOLAM 0.5 MG: 0.5 TABLET ORAL at 23:52

## 2025-02-03 RX ADMIN — ALBUTEROL SULFATE 2.5 MG: 2.5 SOLUTION RESPIRATORY (INHALATION) at 22:02

## 2025-02-03 RX ADMIN — WATER 1000 MG: 1 INJECTION INTRAMUSCULAR; INTRAVENOUS; SUBCUTANEOUS at 20:36

## 2025-02-03 RX ADMIN — AZITHROMYCIN MONOHYDRATE 500 MG: 500 INJECTION, POWDER, LYOPHILIZED, FOR SOLUTION INTRAVENOUS at 20:41

## 2025-02-03 RX ADMIN — WATER 125 MG: 1 INJECTION INTRAMUSCULAR; INTRAVENOUS; SUBCUTANEOUS at 20:30

## 2025-02-03 ASSESSMENT — PAIN DESCRIPTION - LOCATION: LOCATION: HEAD

## 2025-02-03 ASSESSMENT — PAIN - FUNCTIONAL ASSESSMENT: PAIN_FUNCTIONAL_ASSESSMENT: NONE - DENIES PAIN

## 2025-02-03 ASSESSMENT — PAIN SCALES - GENERAL: PAINLEVEL_OUTOF10: 5

## 2025-02-04 ENCOUNTER — APPOINTMENT (OUTPATIENT)
Facility: HOSPITAL | Age: 89
DRG: 190 | End: 2025-02-04
Payer: MEDICARE

## 2025-02-04 PROBLEM — R06.02 SOB (SHORTNESS OF BREATH): Status: ACTIVE | Noted: 2025-02-04

## 2025-02-04 LAB
25(OH)D3 SERPL-MCNC: 34 NG/ML (ref 30–100)
ANION GAP SERPL CALC-SCNC: 11 MMOL/L (ref 2–12)
APPEARANCE UR: CLEAR
BACTERIA URNS QL MICRO: NEGATIVE /HPF
BASOPHILS # BLD: 0 K/UL (ref 0–0.1)
BASOPHILS NFR BLD: 0 % (ref 0–1)
BILIRUB UR QL: NEGATIVE
BUN SERPL-MCNC: 12 MG/DL (ref 8–23)
BUN/CREAT SERPL: ABNORMAL (ref 12–20)
CALCIUM SERPL-MCNC: 8.7 MG/DL (ref 8.2–9.6)
CHLORIDE SERPL-SCNC: 102 MMOL/L (ref 98–107)
CO2 SERPL-SCNC: 23 MMOL/L (ref 22–29)
COLOR UR: ABNORMAL
CREAT SERPL-MCNC: <0.47 MG/DL (ref 0.5–0.9)
D DIMER PPP FEU-MCNC: 2.68 MG/L FEU (ref 0–0.65)
DIFFERENTIAL METHOD BLD: ABNORMAL
EKG ATRIAL RATE: 202 BPM
EKG DIAGNOSIS: NORMAL
EKG Q-T INTERVAL: 300 MS
EKG QRS DURATION: 72 MS
EKG QTC CALCULATION (BAZETT): 404 MS
EKG R AXIS: -31 DEGREES
EKG T AXIS: 70 DEGREES
EKG VENTRICULAR RATE: 109 BPM
EOSINOPHIL # BLD: 0.01 K/UL (ref 0–0.4)
EOSINOPHIL NFR BLD: 0.2 % (ref 0–7)
EPITH CASTS URNS QL MICRO: ABNORMAL /LPF
ERYTHROCYTE [DISTWIDTH] IN BLOOD BY AUTOMATED COUNT: 13.9 % (ref 11.5–14.5)
GLUCOSE SERPL-MCNC: 153 MG/DL (ref 65–100)
GLUCOSE UR STRIP.AUTO-MCNC: NEGATIVE MG/DL
HCT VFR BLD AUTO: 39.6 % (ref 35–47)
HGB BLD-MCNC: 13.2 G/DL (ref 11.5–16)
HGB UR QL STRIP: ABNORMAL
HYALINE CASTS URNS QL MICRO: ABNORMAL /LPF (ref 0–2)
IMM GRANULOCYTES # BLD AUTO: 0.01 K/UL (ref 0–0.04)
IMM GRANULOCYTES NFR BLD AUTO: 0.2 % (ref 0–0.5)
KETONES UR QL STRIP.AUTO: NEGATIVE MG/DL
LEUKOCYTE ESTERASE UR QL STRIP.AUTO: NEGATIVE
LYMPHOCYTES # BLD: 0.53 K/UL (ref 0.8–3.5)
LYMPHOCYTES NFR BLD: 11.7 % (ref 12–49)
MAGNESIUM SERPL-MCNC: 1.7 MG/DL (ref 1.7–2.3)
MCH RBC QN AUTO: 32.1 PG (ref 26–34)
MCHC RBC AUTO-ENTMCNC: 33.3 G/DL (ref 30–36.5)
MCV RBC AUTO: 96.4 FL (ref 80–99)
MONOCYTES # BLD: 0.07 K/UL (ref 0–1)
MONOCYTES NFR BLD: 1.6 % (ref 5–13)
NEUTS SEG # BLD: 3.88 K/UL (ref 1.8–8)
NEUTS SEG NFR BLD: 86.3 % (ref 32–75)
NITRITE UR QL STRIP.AUTO: NEGATIVE
NRBC # BLD: 0 K/UL (ref 0–0.01)
NRBC BLD-RTO: 0 PER 100 WBC
PH UR STRIP: 7 (ref 5–8)
PLATELET # BLD AUTO: 175 K/UL (ref 150–400)
PMV BLD AUTO: 9.8 FL (ref 8.9–12.9)
POTASSIUM SERPL-SCNC: 4.4 MMOL/L (ref 3.5–5.1)
PROT UR STRIP-MCNC: ABNORMAL MG/DL
RBC # BLD AUTO: 4.11 M/UL (ref 3.8–5.2)
RBC #/AREA URNS HPF: ABNORMAL /HPF (ref 0–5)
RBC MORPH BLD: ABNORMAL
SODIUM SERPL-SCNC: 136 MMOL/L (ref 136–145)
SP GR UR REFRACTOMETRY: 1.01 (ref 1–1.03)
TROPONIN I SERPL HS-MCNC: 7 NG/L (ref 0–51)
URINE CULTURE IF INDICATED: ABNORMAL
UROBILINOGEN UR QL STRIP.AUTO: 0.2 EU/DL (ref 0.2–1)
WBC # BLD AUTO: 4.5 K/UL (ref 3.6–11)
WBC URNS QL MICRO: ABNORMAL /HPF (ref 0–4)

## 2025-02-04 PROCEDURE — 81001 URINALYSIS AUTO W/SCOPE: CPT

## 2025-02-04 PROCEDURE — 96376 TX/PRO/DX INJ SAME DRUG ADON: CPT

## 2025-02-04 PROCEDURE — G0378 HOSPITAL OBSERVATION PER HR: HCPCS

## 2025-02-04 PROCEDURE — 96366 THER/PROPH/DIAG IV INF ADDON: CPT

## 2025-02-04 PROCEDURE — 80048 BASIC METABOLIC PNL TOTAL CA: CPT

## 2025-02-04 PROCEDURE — 6360000004 HC RX CONTRAST MEDICATION: Performed by: STUDENT IN AN ORGANIZED HEALTH CARE EDUCATION/TRAINING PROGRAM

## 2025-02-04 PROCEDURE — 97530 THERAPEUTIC ACTIVITIES: CPT

## 2025-02-04 PROCEDURE — 2500000003 HC RX 250 WO HCPCS: Performed by: FAMILY MEDICINE

## 2025-02-04 PROCEDURE — 6370000000 HC RX 637 (ALT 250 FOR IP): Performed by: FAMILY MEDICINE

## 2025-02-04 PROCEDURE — 85025 COMPLETE CBC W/AUTO DIFF WBC: CPT

## 2025-02-04 PROCEDURE — 84484 ASSAY OF TROPONIN QUANT: CPT

## 2025-02-04 PROCEDURE — 71275 CT ANGIOGRAPHY CHEST: CPT

## 2025-02-04 PROCEDURE — 36415 COLL VENOUS BLD VENIPUNCTURE: CPT

## 2025-02-04 PROCEDURE — 6360000002 HC RX W HCPCS: Performed by: FAMILY MEDICINE

## 2025-02-04 PROCEDURE — 93010 ELECTROCARDIOGRAM REPORT: CPT | Performed by: INTERNAL MEDICINE

## 2025-02-04 PROCEDURE — 97161 PT EVAL LOW COMPLEX 20 MIN: CPT

## 2025-02-04 PROCEDURE — 83735 ASSAY OF MAGNESIUM: CPT

## 2025-02-04 PROCEDURE — 6370000000 HC RX 637 (ALT 250 FOR IP): Performed by: STUDENT IN AN ORGANIZED HEALTH CARE EDUCATION/TRAINING PROGRAM

## 2025-02-04 PROCEDURE — 94640 AIRWAY INHALATION TREATMENT: CPT

## 2025-02-04 PROCEDURE — 2580000003 HC RX 258: Performed by: FAMILY MEDICINE

## 2025-02-04 PROCEDURE — 6370000000 HC RX 637 (ALT 250 FOR IP): Performed by: EMERGENCY MEDICINE

## 2025-02-04 PROCEDURE — 85379 FIBRIN DEGRADATION QUANT: CPT

## 2025-02-04 PROCEDURE — 97165 OT EVAL LOW COMPLEX 30 MIN: CPT

## 2025-02-04 RX ORDER — IOPAMIDOL 755 MG/ML
100 INJECTION, SOLUTION INTRAVASCULAR
Status: COMPLETED | OUTPATIENT
Start: 2025-02-04 | End: 2025-02-04

## 2025-02-04 RX ORDER — HYDRALAZINE HYDROCHLORIDE 20 MG/ML
10 INJECTION INTRAMUSCULAR; INTRAVENOUS EVERY 6 HOURS PRN
Status: DISCONTINUED | OUTPATIENT
Start: 2025-02-04 | End: 2025-02-05 | Stop reason: HOSPADM

## 2025-02-04 RX ADMIN — AZITHROMYCIN MONOHYDRATE 500 MG: 500 INJECTION, POWDER, LYOPHILIZED, FOR SOLUTION INTRAVENOUS at 21:12

## 2025-02-04 RX ADMIN — ATORVASTATIN CALCIUM 20 MG: 20 TABLET, FILM COATED ORAL at 20:57

## 2025-02-04 RX ADMIN — IPRATROPIUM BROMIDE AND ALBUTEROL SULFATE 1 DOSE: 2.5; .5 SOLUTION RESPIRATORY (INHALATION) at 15:26

## 2025-02-04 RX ADMIN — METHENAMINE HIPPURATE 1 G: 1 TABLET ORAL at 17:02

## 2025-02-04 RX ADMIN — LISINOPRIL 40 MG: 20 TABLET ORAL at 10:44

## 2025-02-04 RX ADMIN — SODIUM CHLORIDE, PRESERVATIVE FREE 10 ML: 5 INJECTION INTRAVENOUS at 10:53

## 2025-02-04 RX ADMIN — MIRTAZAPINE 15 MG: 15 TABLET, FILM COATED ORAL at 20:57

## 2025-02-04 RX ADMIN — WATER 40 MG: 1 INJECTION INTRAMUSCULAR; INTRAVENOUS; SUBCUTANEOUS at 20:58

## 2025-02-04 RX ADMIN — IPRATROPIUM BROMIDE AND ALBUTEROL SULFATE 1 DOSE: 2.5; .5 SOLUTION RESPIRATORY (INHALATION) at 19:26

## 2025-02-04 RX ADMIN — LEVOTHYROXINE SODIUM 75 MCG: 0.07 TABLET ORAL at 10:45

## 2025-02-04 RX ADMIN — BUDESONIDE 500 MCG: 0.5 SUSPENSION RESPIRATORY (INHALATION) at 19:26

## 2025-02-04 RX ADMIN — ALPRAZOLAM 0.5 MG: 0.5 TABLET ORAL at 20:57

## 2025-02-04 RX ADMIN — METHENAMINE HIPPURATE 1 G: 1 TABLET ORAL at 10:45

## 2025-02-04 RX ADMIN — POLYETHYLENE GLYCOL 3350 17 G: 17 POWDER, FOR SOLUTION ORAL at 18:07

## 2025-02-04 RX ADMIN — ENOXAPARIN SODIUM 40 MG: 100 INJECTION SUBCUTANEOUS at 10:45

## 2025-02-04 RX ADMIN — SODIUM CHLORIDE, PRESERVATIVE FREE 10 ML: 5 INJECTION INTRAVENOUS at 20:57

## 2025-02-04 RX ADMIN — IOPAMIDOL 60 ML: 755 INJECTION, SOLUTION INTRAVENOUS at 13:04

## 2025-02-04 RX ADMIN — IPRATROPIUM BROMIDE AND ALBUTEROL SULFATE 1 DOSE: 2.5; .5 SOLUTION RESPIRATORY (INHALATION) at 11:06

## 2025-02-04 RX ADMIN — ASPIRIN 81 MG: 81 TABLET, CHEWABLE ORAL at 10:43

## 2025-02-04 RX ADMIN — METOPROLOL SUCCINATE 50 MG: 50 TABLET, EXTENDED RELEASE ORAL at 10:44

## 2025-02-04 RX ADMIN — APIXABAN 2.5 MG: 2.5 TABLET, FILM COATED ORAL at 20:57

## 2025-02-04 RX ADMIN — WATER 40 MG: 1 INJECTION INTRAMUSCULAR; INTRAVENOUS; SUBCUTANEOUS at 10:50

## 2025-02-04 NOTE — CARE COORDINATION
2/4/2025  4:07 PM      Care Management Initial Assessment  2/4/2025 4:07 PM  If patient is discharged prior to next notation, then this note serves as note for discharge by case management.    Reason for Admission:   Acute hypoxic respiratory failure [J96.01]  SOB (shortness of breath) [R06.02]         Patient Admission Status: Observation  Date Admitted to INP: N/A   RUR: Readmission Risk Score: 19.6    Hospitalization in the last 30 days (Readmission):  Yes        Advance Care Planning:  Code Status: Full Code  Primary Healthcare Decision Maker: Legal Next of Kin   Advance Directive: has NO advanced directive - not interested in additional information     __________________________________________________________________________  Assessment:      02/04/25 1600   Service Assessment   Patient Orientation Alert and Oriented   Cognition Alert   History Provided By Patient   Primary Caregiver Self   Support Systems Children;Family Members   Patient's Healthcare Decision Maker is: Legal Next of Kin   PCP Verified by CM Yes  (Jessie Lord MD)   Last Visit to PCP Within last 3 months  (2 weeks ago)   Prior Functional Level Independent in ADLs/IADLs   Current Functional Level Assistance with the following:;Bathing;Dressing;Housework   Can patient return to prior living arrangement Yes   Ability to make needs known: Good   Family able to assist with home care needs: Yes   Financial Resources Medicare;Other (Comment)  (MedicareA/B, [place] supplement)   Community Resources None   Social/Functional History   Lives With Alone   [facility]   Home Layout One level   Home Access Level entry   Bathroom Shower/Tub Walk-in shower   Bathroom Equipment Shower chair;Grab bars in shower   Home Equipment Rollator;Cane   Prior Level of Assist for ADLs Independent   Prior Level of Assist for Homemaking Independent   Prior Level of Assist for Transfers Independent   Active  No   Patient's

## 2025-02-04 NOTE — ED NOTES
Verbal shift change report given to GT Aguilar (oncoming nurse) by GT Mallory (offgoing nurse). Report included the following information Nurse Handoff Report, ED Encounter Summary, ED SBAR, MAR, Recent Results, Cardiac Rhythm afib, and Neuro Assessment.

## 2025-02-04 NOTE — ED TRIAGE NOTES
Pt to ED via wheelchair with daughter c/o shortness of breath. Daughters endorses SOB starting this morning but has not improved with nebulizer or rescue inhaler.     Pt denies pain.    Pt endorses hx of asthma and COPD

## 2025-02-04 NOTE — ED NOTES
TRANSFER - OUT REPORT:    Verbal report given to Pia RN on Zoey Sneed  being transferred to [facility] ED for routine progression of patient care       Report consisted of patient's Situation, Background, Assessment and   Recommendations(SBAR).     Information from the following report(s) Nurse Handoff Report, ED SBAR, Intake/Output, MAR, Recent Results, and Cardiac Rhythm Afib (low rate)  was reviewed with the receiving nurse.    [place] Fall Assessment:    Presents to emergency department  because of falls (Syncope, seizure, or loss of consciousness): No  Age > 70: Yes  Altered Mental Status, Intoxication with alcohol or substance confusion (Disorientation, impaired judgment, poor safety awaremess, or inability to follow instructions): No  Impaired Mobility: Ambulates or transfers with assistive devices or assistance; Unable to ambulate or transer.: Yes (rollator)  Nursing Judgement: Yes          Lines:   Peripheral IV 02/03/25 Left Antecubital (Active)        Opportunity for questions and clarification was provided.      Patient transported with:  Monitor and O2 @ 2lpm

## 2025-02-04 NOTE — ACP (ADVANCE CARE PLANNING)
[facility] Medicine                                   Advance Care Planning Note    Name: Zoey Sneed  YOB: 1930  MRN: [id number]  Admission Date: 2/3/2025  7:48 PM    Date of discussion: 2/4/2025    Active Diagnoses:        These active diagnoses are of sufficient risk that focused discussion on advance care planning is indicated in order to allow the patient to thoughtfully consider personal goals of care, and if situations arise that prevent the ability to personally give input, to ensure appropriate representation of their personal desires for different levels and aggressiveness of care.     Discussion:     Persons present and participating in discussion: Zoey Sneed, Mike Cueva MD,     Topics Discussed:  Patient's medical condition and diagnosis: [ x ] yes [  ] no   Surrogate decision maker: [ x ] yes [  ] no   Patient's current physical function/cognitive function/frailty: [ x ] yes [  ] no   Code Status: [ x ] yes [  ] no   Artificial Nutrition / Dialysis / Non-Invasive Ventilation / Blood Transfusion: [x  ] yes [  ] no  Potential Resources for home (durable medical equipment, home nursing, home O2): [x  ] yes [  ] no    Overview of Discussion: Full code     Time Spent:     Total time spent face-to-face in education and discussion: 16 minutes.     Mike Cueva MD  Date of Service:  2/4/2025  12:33 PM

## 2025-02-04 NOTE — ED NOTES
H ALS crew on standby while RN replaces javier catheter at request for [facility] ED nursing.

## 2025-02-04 NOTE — ED NOTES
Pt ambulating in room on RA.  O2 sats 85-88%, increased work of breathing and dyspnea noted.  Pt also having difficulty speaking in full sentences.  Pt assisted back to bed, O2 @ 2LPM applied.  O2 sats up to 96%. Dr. Desai notified.

## 2025-02-04 NOTE — ED PROVIDER NOTES
EMERGENCY DEPARTMENT PHYSICIAN NOTE     Patient: Zoey Sneed     Time of Service: 2/3/2025  7:48 PM     Chief complaint:   Chief Complaint   Patient presents with    Shortness of Breath        HISTORY:  Patient is a 89 y.o. female who presents to the emergency department with complaints of shortness of breath.       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    Atrial fibrillation (HCC)     Atrophy of vulva 4/22/2009    Generalized anxiety disorder     Hx of bone density study 2/24/14    Normal    Hypertension     Postmenopausal atrophic vaginitis 4/22/2009    Symptomatic menopausal or female climacteric states     Unspecified asthma(493.90)     Unspecified hypothyroidism         Past Surgical History:   Procedure Laterality Date    HYSTERECTOMY, TOTAL ABDOMINAL (CERVIX REMOVED)  1970    W/ USO    OTHER SURGICAL HISTORY      Anterior Posterior Repair    THYROIDECTOMY, PARTIAL      TONSILLECTOMY      UROLOGICAL SURGERY      TVT        Family History   Problem Relation Age of Onset    Cancer Father         Prostate        Social History     Socioeconomic History    Marital status:    Tobacco Use    Smoking status: Never    Smokeless tobacco: Never   Vaping Use    Vaping status: Never Used   Substance and Sexual Activity    Alcohol use: Not Currently    Drug use: Never    Sexual activity: Defer     Social Determinants of Health     Food Insecurity: No Food Insecurity (2/4/2025)    Hunger Vital Sign     Worried About Running Out of Food in the Last Year: Never true     Ran Out of Food in the Last Year: Never true   Transportation Needs: No Transportation Needs (2/4/2025)    PRAPARE - Transportation     Lack of Transportation (Medical): No     Lack of Transportation (Non-Medical): No   Housing Stability: Low Risk  (2/4/2025)    Housing Stability Vital Sign     Unable to Pay for Housing in the Last Year: No     Number of Times Moved in the Last Year: 0

## 2025-02-04 NOTE — ED NOTES
Bedside and Verbal shift change report given to GT Krishnamurthy (oncoming nurse) by GT Aguilar (offgoing nurse). Report included the following information ED SBAR, MAR, Recent Results, Cardiac Rhythm Afib, and Neuro Assessment.

## 2025-02-04 NOTE — ED NOTES
I called and spoke with daughter, Justine to make her aware that pt is in room 384, gave Justine number to floor as well.

## 2025-02-04 NOTE — ED NOTES
UA and Culture sent from [facility]. [facility] ALS crew out the door at this time with patient.

## 2025-02-04 NOTE — PLAN OF CARE
Problem: Occupational Therapy - Adult  Goal: By Discharge: Performs self-care activities at highest level of function for planned discharge setting.  See evaluation for individualized goals.  Description: FUNCTIONAL STATUS PRIOR TO ADMISSION: Patient resided at [facility] and was independent with ADLs and functional mobility/ ambulatory without AD in apartment vs rollator in [place] for longer distances.  No recent falls.  On room air.  Recently had a javier catheter placed outpatient.    Occupational Therapy Goals:  Initiated 2/4/2025  1.  Patient will perform grooming standing at sink with Modified [place] within 7 day(s).  2.  Patient will perform lower body dressing with Modified [place] within 7 day(s).  3.  Patient will perform bathing with Modified [place] within 7 day(s).  4.  Patient will perform toilet transfers with Modified [place]  within 7 day(s).  5.  Patient will perform all aspects of toileting with Modified [place] within 7 day(s).  6.  Patient will participate in upper extremity therapeutic exercise/activities with [place] for 10 minutes within 7 day(s).    7.  Patient will utilize energy conservation techniques during functional activities without cuing within 7 day(s).  8.  Patient will utilize fall prevention techniques during functional activities without cuing within 7 day(s).      Outcome: Progressing     OCCUPATIONAL THERAPY EVALUATION    Patient: Zoey Sneed (89 y.o. female)  Date: 2/4/2025  Primary Diagnosis: Acute hypoxic respiratory failure [J96.01]  SOB (shortness of breath) [R06.02]         Precautions: fall    ASSESSMENT :  Patient presents for OT evaluation A&Ox4, pleasant and cooperative, s/p readmission from [facility] for AHRF/ SOB.  She is slightly below her independent baseline due to mild CLEMENTE (although VSS on room air), impaired balance, and mild overall weakness.  She is currently functioning at SBA level using RW and would benefit from HHOT at d/c.

## 2025-02-04 NOTE — H&P
________________________________________________________________________  TOTAL TIME:  45 Minutes        Comments   >50% of visit spent in counseling and coordination of care x Chart reviewed  Discussion with patient and/or family and questions answered     ________________________________________________________________________  Signed: Mike Cueva MD    Portions of this note were completed with Dragon, the computer voice recognition software.  Quite often unanticipated grammatical, syntax, homophones, and other interpretive errors are inadvertently transcribed by the computer software.  Please disregard these errors.  Efforts were made to edit the dictations but occasionally words are mis-transcribed.     Procedures: see electronic medical records for all procedures/Xrays/labs and details which were not copied into this note but were reviewed prior to creation of Plan.

## 2025-02-04 NOTE — ED NOTES
Pt to room 10 from triage.  Pt very SOB with audible wheezing.  Dr. Desai at bedside to assess.  O2 sat low 80's on RA.  Placed on O2 @ 3LPM via NC.  O2 sats up to 94-96%.  Verbal order for sebastián encinas'lacy from Dr. Desai.

## 2025-02-04 NOTE — ED NOTES
Minimal urine return d/t patient have javier prior. Small sample for UA obtained. Insufficient in volume for culture.

## 2025-02-04 NOTE — PLAN OF CARE
Problem: Physical Therapy - Adult  Goal: By Discharge: Performs mobility at highest level of function for planned discharge setting.  See evaluation for individualized goals.  Description: FUNCTIONAL STATUS PRIOR TO ADMISSION: Patient was modified independent using a rollator for functional mobility.  Rollator use recent since admission to [facility] 1 month ago, does not use any device at times.    HOME SUPPORT PRIOR TO ADMISSION: The patient lived alone at [facility] with minimal local support.    Physical Therapy Goals  Initiated 2/4/2025  1.  Patient will move from supine to sit and sit to supine, scoot up and down, and roll side to side in bed with modified independence within 7 day(s).    2.  Patient will perform sit to stand with modified independence within 7 day(s).  3.  Patient will transfer from bed to chair and chair to bed with modified independence using the least restrictive device within 7 day(s).  4.  Patient will ambulate with modified independence for 150 feet with the least restrictive device within 7 day(s).     Outcome: Progressing   PHYSICAL THERAPY EVALUATION    Patient: Zoey Sneed (89 y.o. female)  Date: 2/4/2025  Primary Diagnosis: Acute hypoxic respiratory failure [J96.01]  SOB (shortness of breath) [R06.02]       Precautions:                        ASSESSMENT :   DEFICITS/IMPAIRMENTS:   The patient is limited by decreased functional mobility, activity tolerance, endurance, balance, and gait dysfunction s/p admission for SOB and respiratory failure. Patient seen by this therapist last month, very agreeable to participate today, oriented x 4 and very pleasant.  Received on RA in ED hallway, sats in mid 90s.  Completed gait with RW in hallway with no LOB or safety concerns and sats > 90% on RA, only mild c/o SOB.       Based on the impairments listed above, patient appears under but close to recent baseline, limited primarily by hypoxia and activity tolerance.  Lives alone but very independent, has been

## 2025-02-04 NOTE — PROGRESS NOTES
Spiritual Health History and Assessment/Progress Note  [facility]    Attempted Encounter,  ,  ,      Name: Zoey Sneed MRN: [id number]    Age: 89 y.o.     Sex: female   Language: English   Jewish: Sabianism   Acute hypoxic respiratory failure     Date: 2/4/2025            Total Time Calculated: 10 min              Spiritual Assessment unable to assess in [facility] EMERGENCY DEPARTMENT        Referral/Consult From: Rounding   Encounter Overview/Reason: Attempted Encounter  Service Provided For: Patient not available    Stacey, Belief, Meaning:   Patient unable to assess at this time  Family/Friends No family/friends present      Importance and Influence:  Patient unable to assess at this time  Family/Friends No family/friends present    Community:  Patient unknown  Family/Friends No family/friends present    Assessment and Plan of Care:   Patient Interventions include: Visited. Pt with CM.  Family/Friends Interventions include: No family/friends present    Patient Plan of Care: Spiritual Care available upon further referral  Family/Friends Plan of Care: No family/friends present    Chart review. Attempted encounter. Patient unavailable. Unable to assess. Patient with CM. Please contact spiritual health services for further assistance needed.    Electronically signed by Chaplain Leon on 2/4/2025 at 3:59 PM

## 2025-02-05 ENCOUNTER — TELEPHONE (OUTPATIENT)
Age: 89
End: 2025-02-05

## 2025-02-05 ENCOUNTER — APPOINTMENT (OUTPATIENT)
Facility: HOSPITAL | Age: 89
DRG: 190 | End: 2025-02-05
Attending: FAMILY MEDICINE
Payer: MEDICARE

## 2025-02-05 VITALS
RESPIRATION RATE: 17 BRPM | SYSTOLIC BLOOD PRESSURE: 155 MMHG | WEIGHT: 125 LBS | HEART RATE: 87 BPM | DIASTOLIC BLOOD PRESSURE: 94 MMHG | HEIGHT: 63 IN | TEMPERATURE: 97.7 F | BODY MASS INDEX: 22.15 KG/M2 | OXYGEN SATURATION: 96 %

## 2025-02-05 PROBLEM — J96.01 ACUTE HYPOXIC RESPIRATORY FAILURE: Status: RESOLVED | Noted: 2025-01-09 | Resolved: 2025-02-05

## 2025-02-05 PROBLEM — R06.02 SOB (SHORTNESS OF BREATH): Status: RESOLVED | Noted: 2025-02-04 | Resolved: 2025-02-05

## 2025-02-05 LAB
ANION GAP SERPL CALC-SCNC: 5 MMOL/L (ref 2–12)
BUN SERPL-MCNC: 20 MG/DL (ref 6–20)
BUN/CREAT SERPL: 27 (ref 12–20)
CALCIUM SERPL-MCNC: 9 MG/DL (ref 8.5–10.1)
CHLORIDE SERPL-SCNC: 103 MMOL/L (ref 97–108)
CO2 SERPL-SCNC: 26 MMOL/L (ref 21–32)
CREAT SERPL-MCNC: 0.73 MG/DL (ref 0.55–1.02)
ECHO AO ASC DIAM: 2.6 CM
ECHO AO ASCENDING AORTA INDEX: 1.65 CM/M2
ECHO AO ROOT DIAM: 2.7 CM
ECHO AO ROOT INDEX: 1.71 CM/M2
ECHO AV AREA PEAK VELOCITY: 1.4 CM2
ECHO AV AREA VTI: 1.3 CM2
ECHO AV AREA/BSA PEAK VELOCITY: 0.9 CM2/M2
ECHO AV AREA/BSA VTI: 0.8 CM2/M2
ECHO AV MEAN GRADIENT: 5 MMHG
ECHO AV MEAN VELOCITY: 1.1 M/S
ECHO AV PEAK GRADIENT: 9 MMHG
ECHO AV PEAK VELOCITY: 1.5 M/S
ECHO AV VELOCITY RATIO: 0.6
ECHO AV VTI: 30 CM
ECHO BSA: 1.59 M2
ECHO EST RA PRESSURE: 3 MMHG
ECHO LA DIAMETER INDEX: 2.47 CM/M2
ECHO LA DIAMETER: 3.9 CM
ECHO LA TO AORTIC ROOT RATIO: 1.44
ECHO LA VOL A-L A2C: 34 ML (ref 22–52)
ECHO LA VOL A-L A4C: 71 ML (ref 22–52)
ECHO LA VOL BP: 52 ML (ref 22–52)
ECHO LA VOL MOD A2C: 28 ML (ref 22–52)
ECHO LA VOL MOD A4C: 62 ML (ref 22–52)
ECHO LA VOL/BSA BIPLANE: 33 ML/M2 (ref 16–34)
ECHO LA VOLUME AREA LENGTH: 62 ML
ECHO LA VOLUME INDEX A-L A2C: 22 ML/M2 (ref 16–34)
ECHO LA VOLUME INDEX A-L A4C: 45 ML/M2 (ref 16–34)
ECHO LA VOLUME INDEX AREA LENGTH: 39 ML/M2 (ref 16–34)
ECHO LA VOLUME INDEX MOD A2C: 18 ML/M2 (ref 16–34)
ECHO LA VOLUME INDEX MOD A4C: 39 ML/M2 (ref 16–34)
ECHO LV EDV A2C: 29 ML
ECHO LV EDV A4C: 40 ML
ECHO LV EDV BP: 38 ML (ref 56–104)
ECHO LV EDV INDEX A4C: 25 ML/M2
ECHO LV EDV INDEX BP: 24 ML/M2
ECHO LV EDV NDEX A2C: 18 ML/M2
ECHO LV EJECTION FRACTION A2C: 63 %
ECHO LV EJECTION FRACTION A4C: 56 %
ECHO LV EJECTION FRACTION BIPLANE: 59 % (ref 55–100)
ECHO LV ESV A2C: 11 ML
ECHO LV ESV A4C: 17 ML
ECHO LV ESV BP: 15 ML (ref 19–49)
ECHO LV ESV INDEX A2C: 7 ML/M2
ECHO LV ESV INDEX A4C: 11 ML/M2
ECHO LV ESV INDEX BP: 9 ML/M2
ECHO LV FRACTIONAL SHORTENING: 43 % (ref 28–44)
ECHO LV INTERNAL DIMENSION DIASTOLE INDEX: 2.22 CM/M2
ECHO LV INTERNAL DIMENSION DIASTOLIC: 3.5 CM (ref 3.9–5.3)
ECHO LV INTERNAL DIMENSION SYSTOLIC INDEX: 1.27 CM/M2
ECHO LV INTERNAL DIMENSION SYSTOLIC: 2 CM
ECHO LV IVSD: 0.8 CM (ref 0.6–0.9)
ECHO LV MASS 2D: 75.3 G (ref 67–162)
ECHO LV MASS INDEX 2D: 47.7 G/M2 (ref 43–95)
ECHO LV POSTERIOR WALL DIASTOLIC: 0.8 CM (ref 0.6–0.9)
ECHO LV RELATIVE WALL THICKNESS RATIO: 0.46
ECHO LVOT AREA: 2.5 CM2
ECHO LVOT AV VTI INDEX: 0.59
ECHO LVOT DIAM: 1.8 CM
ECHO LVOT MEAN GRADIENT: 2 MMHG
ECHO LVOT PEAK GRADIENT: 4 MMHG
ECHO LVOT PEAK VELOCITY: 0.9 M/S
ECHO LVOT STROKE VOLUME INDEX: 28.7 ML/M2
ECHO LVOT SV: 45.3 ML
ECHO LVOT VTI: 17.8 CM
ECHO MV AREA VTI: 2.1 CM2
ECHO MV LVOT VTI INDEX: 1.19
ECHO MV MAX VELOCITY: 1.5 M/S
ECHO MV MEAN GRADIENT: 4 MMHG
ECHO MV MEAN VELOCITY: 0.9 M/S
ECHO MV PEAK GRADIENT: 9 MMHG
ECHO MV VTI: 21.2 CM
ECHO PULMONARY ARTERY END DIASTOLIC PRESSURE: 7 MMHG
ECHO PULMONARY ARTERY SYSTOLIC PRESSURE (PASP): 34 MMHG
ECHO PV MAX VELOCITY: 0.9 M/S
ECHO PV PEAK GRADIENT: 3 MMHG
ECHO PV REGURGITANT MAX VELOCITY: 1.3 M/S
ECHO RIGHT VENTRICULAR SYSTOLIC PRESSURE (RVSP): 34 MMHG
ECHO RV FREE WALL PEAK S': 7.6 CM/S
ECHO RV INTERNAL DIMENSION: 2.4 CM
ECHO RV TAPSE: 1 CM (ref 1.7–?)
ECHO TV REGURGITANT MAX VELOCITY: 2.78 M/S
ECHO TV REGURGITANT PEAK GRADIENT: 32 MMHG
ERYTHROCYTE [DISTWIDTH] IN BLOOD BY AUTOMATED COUNT: 14.1 % (ref 11.5–14.5)
GLUCOSE SERPL-MCNC: 149 MG/DL (ref 65–100)
HCT VFR BLD AUTO: 39.1 % (ref 35–47)
HGB BLD-MCNC: 13.1 G/DL (ref 11.5–16)
MCH RBC QN AUTO: 32 PG (ref 26–34)
MCHC RBC AUTO-ENTMCNC: 33.5 G/DL (ref 30–36.5)
MCV RBC AUTO: 95.4 FL (ref 80–99)
NRBC # BLD: 0 K/UL (ref 0–0.01)
NRBC BLD-RTO: 0 PER 100 WBC
PLATELET # BLD AUTO: 215 K/UL (ref 150–400)
PMV BLD AUTO: 9.7 FL (ref 8.9–12.9)
POTASSIUM SERPL-SCNC: 4.5 MMOL/L (ref 3.5–5.1)
RBC # BLD AUTO: 4.1 M/UL (ref 3.8–5.2)
SODIUM SERPL-SCNC: 134 MMOL/L (ref 136–145)
WBC # BLD AUTO: 8.8 K/UL (ref 3.6–11)

## 2025-02-05 PROCEDURE — 36415 COLL VENOUS BLD VENIPUNCTURE: CPT

## 2025-02-05 PROCEDURE — 6370000000 HC RX 637 (ALT 250 FOR IP): Performed by: FAMILY MEDICINE

## 2025-02-05 PROCEDURE — 6370000000 HC RX 637 (ALT 250 FOR IP): Performed by: STUDENT IN AN ORGANIZED HEALTH CARE EDUCATION/TRAINING PROGRAM

## 2025-02-05 PROCEDURE — 93306 TTE W/DOPPLER COMPLETE: CPT

## 2025-02-05 PROCEDURE — 80048 BASIC METABOLIC PNL TOTAL CA: CPT

## 2025-02-05 PROCEDURE — 6360000002 HC RX W HCPCS: Performed by: FAMILY MEDICINE

## 2025-02-05 PROCEDURE — 85027 COMPLETE CBC AUTOMATED: CPT

## 2025-02-05 PROCEDURE — G0378 HOSPITAL OBSERVATION PER HR: HCPCS

## 2025-02-05 PROCEDURE — 96376 TX/PRO/DX INJ SAME DRUG ADON: CPT

## 2025-02-05 PROCEDURE — 97530 THERAPEUTIC ACTIVITIES: CPT

## 2025-02-05 PROCEDURE — 94761 N-INVAS EAR/PLS OXIMETRY MLT: CPT

## 2025-02-05 PROCEDURE — 94640 AIRWAY INHALATION TREATMENT: CPT

## 2025-02-05 PROCEDURE — 2500000003 HC RX 250 WO HCPCS: Performed by: FAMILY MEDICINE

## 2025-02-05 RX ORDER — BENZONATATE 100 MG/1
100 CAPSULE ORAL 3 TIMES DAILY PRN
Qty: 30 CAPSULE | Refills: 0 | Status: SHIPPED | OUTPATIENT
Start: 2025-02-05 | End: 2025-02-15

## 2025-02-05 RX ORDER — AZITHROMYCIN 500 MG/1
500 TABLET, FILM COATED ORAL DAILY
Qty: 3 TABLET | Refills: 0 | Status: SHIPPED | OUTPATIENT
Start: 2025-02-05 | End: 2025-02-08

## 2025-02-05 RX ORDER — ALBUTEROL SULFATE 90 UG/1
2 INHALANT RESPIRATORY (INHALATION) 4 TIMES DAILY PRN
Qty: 18 G | Refills: 0 | Status: SHIPPED | OUTPATIENT
Start: 2025-02-05

## 2025-02-05 RX ORDER — ALBUTEROL SULFATE 0.83 MG/ML
2.5 SOLUTION RESPIRATORY (INHALATION) 4 TIMES DAILY PRN
Qty: 120 EACH | Refills: 3 | Status: SHIPPED | OUTPATIENT
Start: 2025-02-05

## 2025-02-05 RX ORDER — MIRTAZAPINE 15 MG/1
15 TABLET, FILM COATED ORAL NIGHTLY
Qty: 30 TABLET | Refills: 3 | Status: SHIPPED | OUTPATIENT
Start: 2025-02-05

## 2025-02-05 RX ORDER — IPRATROPIUM BROMIDE AND ALBUTEROL SULFATE 2.5; .5 MG/3ML; MG/3ML
1 SOLUTION RESPIRATORY (INHALATION)
Status: DISCONTINUED | OUTPATIENT
Start: 2025-02-05 | End: 2025-02-05 | Stop reason: HOSPADM

## 2025-02-05 RX ORDER — PREDNISONE 20 MG/1
40 TABLET ORAL DAILY
Qty: 10 TABLET | Refills: 0 | Status: SHIPPED | OUTPATIENT
Start: 2025-02-05 | End: 2025-02-10

## 2025-02-05 RX ORDER — BUDESONIDE AND FORMOTEROL FUMARATE DIHYDRATE 160; 4.5 UG/1; UG/1
2 AEROSOL RESPIRATORY (INHALATION) 2 TIMES DAILY
Qty: 10.2 G | Refills: 3 | Status: SHIPPED | OUTPATIENT
Start: 2025-02-05

## 2025-02-05 RX ADMIN — WATER 40 MG: 1 INJECTION INTRAMUSCULAR; INTRAVENOUS; SUBCUTANEOUS at 09:08

## 2025-02-05 RX ADMIN — METOPROLOL SUCCINATE 50 MG: 50 TABLET, EXTENDED RELEASE ORAL at 09:08

## 2025-02-05 RX ADMIN — IPRATROPIUM BROMIDE AND ALBUTEROL SULFATE 1 DOSE: 2.5; .5 SOLUTION RESPIRATORY (INHALATION) at 07:26

## 2025-02-05 RX ADMIN — APIXABAN 2.5 MG: 2.5 TABLET, FILM COATED ORAL at 09:08

## 2025-02-05 RX ADMIN — ASPIRIN 81 MG: 81 TABLET, CHEWABLE ORAL at 09:08

## 2025-02-05 RX ADMIN — BUDESONIDE 500 MCG: 0.5 SUSPENSION RESPIRATORY (INHALATION) at 07:26

## 2025-02-05 RX ADMIN — METHENAMINE HIPPURATE 1 G: 1 TABLET ORAL at 10:23

## 2025-02-05 RX ADMIN — IPRATROPIUM BROMIDE AND ALBUTEROL SULFATE 1 DOSE: 2.5; .5 SOLUTION RESPIRATORY (INHALATION) at 13:34

## 2025-02-05 RX ADMIN — SODIUM CHLORIDE, PRESERVATIVE FREE 10 ML: 5 INJECTION INTRAVENOUS at 09:08

## 2025-02-05 RX ADMIN — LEVOTHYROXINE SODIUM 75 MCG: 0.07 TABLET ORAL at 05:20

## 2025-02-05 RX ADMIN — LISINOPRIL 40 MG: 20 TABLET ORAL at 09:08

## 2025-02-05 ASSESSMENT — PAIN SCALES - GENERAL
PAINLEVEL_OUTOF10: 0
PAINLEVEL_OUTOF10: 0

## 2025-02-05 NOTE — DISCHARGE INSTRUCTIONS
HOSPITALIST DISCHARGE INSTRUCTIONS  NAME:  Zoey Sneed   :  10/8/1930   MRN:  [id number]     Date/Time:  2025 3:33 PM    ADMIT DATE: 2/3/2025     DISCHARGE DATE: 2025     DISCHARGE DIAGNOSIS:  ***    DISCHARGE INSTRUCTIONS:  Thank you for allowing us to participate in your care. Your discharging Hospitalist is Mike Cueva MD. You were admitted for evaluation and treatment of the above.     # Acute hypoxic respiratory failure  # COPD exacerbation  -Patient was initially placed on 3 L nasal cannula, currently on room air  -CXR: No infiltrates  -CTA:  No acute PE   Mild bronchial wall thickening in the lower lobes. This may be related to the  patient's underlying COPD or a nonspecific bronchitis    2 mm nodule right upper lobe  -No leukocytosis patient is afebrile  Plan  -Pulmicort and DuoNeb scheduled  -Albuterol, Tessalon perles as needed  -Mucinex scheduled  -Continue azithromycin 500 mg IV daily x 5 days > can switch to po on d/c   -Continue steroid course with Solu-Medrol IV 40 mg twice daily, can wean to p.o. > continue on oral steroid x 5 days   -As needed NC O2 currently on RA   -PT/OT      # Acute HF  Patient is not volume overloaded on physical exam  -  -No signs of volume overload on imaging studies  -Etiology most likely secondary to ongoing COPD exacerbation    #Hx A-fib  -Continue home metoprolol  -Continue home Eliquis 2.5 mg p.o. daily  -Continue telemetry     #Elevated troponin  -Patient denies any chest pain involvement  ECHO: 60%     Accelerated hypertension  Continue metoprolol, lisinopril     Hypothyroidism, POA  continue levothyroxine     Anxiety  Xanax as needed     Hyperlipidemia, POA  Continue statin      Recurrent UTI  -Pt is asymptomatic   -UA neg          MEDICATIONS:    It is important that you take the medication exactly as they are prescribed.   Keep your medication in the bottles provided by the pharmacist and keep a list of the medication names, dosages, and times

## 2025-02-05 NOTE — PROGRESS NOTES
Physician Progress Note      PATIENT:               BREANN GRAMAJO  [facility] #:                  [id number]  :                       10/8/1930  ADMIT DATE:       2/3/2025 7:48 PM  DISCH DATE:  RESPONDING  PROVIDER #:        Mike Cueva MD          QUERY TEXT:    Patient admitted with acute respiratory failure and COPD exacerbation, noted   to have atrial fibrillation and is maintained on Eliquis. If possible, please   document in progress notes and discharge summary if you are evaluating and/or   treating any of the following:?  ?  The medical record reflects the following:  Risk Factors: HTN, 89-year-old female  Clinical Indicators: admitted with acute respiratory failure and COPD   exacerbation  - noted to have Afib and maintained on Eliquis  Treatment: Eliquis 2.5 mg 2 times daily  Options provided:  -- Secondary hypercoagulable state in a patient with atrial fibrillation  -- Other - I will add my own diagnosis  -- Disagree - Not applicable / Not valid  -- Disagree - Clinically unable to determine / Unknown  -- Refer to Clinical Documentation Reviewer    PROVIDER RESPONSE TEXT:    This patient has secondary hypercoagulable state in a patient with atrial   fibrillation.    Query created by: Leda Santos on 2025 10:52 AM      Electronically signed by:  Mike Cueva MD 2025 11:53 AM

## 2025-02-05 NOTE — PLAN OF CARE
Problem: Physical Therapy - Adult  Goal: By Discharge: Performs mobility at highest level of function for planned discharge setting.  See evaluation for individualized goals.  Description: FUNCTIONAL STATUS PRIOR TO ADMISSION: Patient was modified independent using a rollator for functional mobility.  Rollator use recent since admission to [facility] 1 month ago, does not use any device at times.    HOME SUPPORT PRIOR TO ADMISSION: The patient lived alone at [facility] with minimal local support.    Physical Therapy Goals  Initiated 2/4/2025  1.  Patient will move from supine to sit and sit to supine, scoot up and down, and roll side to side in bed with modified independence within 7 day(s).    2.  Patient will perform sit to stand with modified independence within 7 day(s).  3.  Patient will transfer from bed to chair and chair to bed with modified independence using the least restrictive device within 7 day(s).  4.  Patient will ambulate with modified independence for 150 feet with the least restrictive device within 7 day(s).     Outcome: Progressing   PHYSICAL THERAPY TREATMENT    Patient: Zoey Sneed (89 y.o. female)  Date: 2/5/2025  Diagnosis: Shortness of breath [R06.02]  SOB (shortness of breath) [R06.02]  COPD exacerbation (HCC) [J44.1]  Respiratory failure, unspecified chronicity, unspecified whether with hypoxia or hypercapnia [J96.90]  Acute hypoxic respiratory failure [J96.01] Acute hypoxic respiratory failure      Precautions:                        ASSESSMENT:  Patient continues to benefit from skilled PT services and is progressing towards goals. Patient eager to mobilize upon PT arrival, requesting to get up to chair. Overall mobilizing very close to her baseline with RW, minor cues for hand placement but uses rollator typically which has brakes.  SpO2 stable in high 90s on RA with activity. MD arrived after up to chair to clear patient for discharge, patient cleared from mobility standpoint with HHPT

## 2025-02-05 NOTE — TELEPHONE ENCOUNTER
Called patients daughter. She stated that the patient would like to keep the appt incase they do not take out the patients cath tonight. She also stated that they will call in the morning if the patient is still in the Hospital to r/s.     The patients daughter stated that the on call provider wanted to get in contact with Dr. Groves regarding the cath and as long as he is okay with it he will remove it while she is in the hospital.

## 2025-02-05 NOTE — CARE COORDINATION
Case Management Discharge Note    Discharge Plan:  Patient DC to home today ([facility]) with [facility].    [facility] [place]--Accepted.  DC orders uploaded to [facility].    Transportation at DC:  Dtr-in-law    CM met with patient at bedside to discuss DCP updates.  Patient confirmed DCP back to [facility].  Patient stated no other needs.    Patient stated that she lives alone in her cottage at [facility] and still drives short distances.      ______________________________________  DOMINGO Carpio, RN-CM  [facility]- Care Management  Available via [employer]  2/5/2025  3:42 PM

## 2025-02-05 NOTE — ACP (ADVANCE CARE PLANNING)
[facility] Medicine                                   Advance Care Planning Note    Name: Zoey Sneed  YOB: 1930  MRN: [id number]  Admission Date: 2/3/2025  7:48 PM    Date of discussion: 2/5/2025    Active Diagnoses:        These active diagnoses are of sufficient risk that focused discussion on advance care planning is indicated in order to allow the patient to thoughtfully consider personal goals of care, and if situations arise that prevent the ability to personally give input, to ensure appropriate representation of their personal desires for different levels and aggressiveness of care.     Discussion:     Persons present and participating in discussion: Zoey Sneed, Mike Cueva MD,     Topics Discussed:  Patient's medical condition and diagnosis: [  x] yes [  ] no   Surrogate decision maker: [  x] yes [  ] no   Patient's current physical function/cognitive function/frailty: [ x ] yes [  ] no   Code Status: [ x ] yes [  ] no   Artificial Nutrition / Dialysis / Non-Invasive Ventilation / Blood Transfusion: [ x ] yes [  ] no  Potential Resources for home (durable medical equipment, home nursing, home O2): [ x ] yes [  ] no    Overview of Discussion: DNR     Time Spent:     Total time spent face-to-face in education and discussion: 17 minutes.     Mike Cueva MD  Date of Service:  2/5/2025  3:48 PM

## 2025-02-05 NOTE — TELEPHONE ENCOUNTER
It appears she is admitted at [facility] for COPD exacerbation.  She has apt here with me tomorrow. Can we call her alternate  and get her rescheduled?

## 2025-02-05 NOTE — PROGRESS NOTES
HISTORY OF PRESENT ILLNESS  Zoey Sneed is a 89 y.o. female.  Chief Complaint   Patient presents with    Procedure     Cath Removal       HPI    She was seen in the ER on 1/30/25 with cc: urinary retention.  She was not in pain, but reported drinking a lot of water with little output.  She reported that she was only voiding in small dribbles and was scanned for a volume of greater than 500 cc per the MD report.  A javier catheter was placed and she was discharged home on nitrofurantoin.    Culture reviewed; E coli; pan-sensitive.    She is on methenamine bid.  Her med list shows a current RX for nitrofurantoin since 1/31/25 scheduled to finish tomorrow and azithromycin to finish 2/8/25.    Bethanechol is no longer on her med list.    She was admitted to [facility] for COPD exacerbation and discharged yesterday.  They contacted Dr. Groves about removing javier.  He was amenable, but this was not done.  The nursing staff instead exchanged the javier prior to her discharge.  She is here today for removal.    No voiding trial was done due to the shortage of supplies.    PAST MEDICAL HISTORY:  PMHx (including negatives):  has a past medical history of Acquired absence of both cervix and uterus, Atrial fibrillation (HCC), Atrophy of vulva, Generalized anxiety disorder, Hx of bone density study, Hypertension, Postmenopausal atrophic vaginitis, Symptomatic menopausal or female climacteric states, Unspecified asthma(493.90), and Unspecified hypothyroidism.   PSurgHx:  has a past surgical history that includes Thyroidectomy, partial; Hysterectomy, total abdominal (1970); Tonsillectomy; Urological Surgery; and other surgical history.  PSocHx:  reports that she has never smoked. She has never used smokeless tobacco. She reports that she does not currently use alcohol. She reports that she does not use drugs.     Review of Systems   Respiratory:  Positive for cough. Negative for chest tightness, shortness of breath and wheezing.

## 2025-02-05 NOTE — DISCHARGE SUMMARY
SCOOP AS DIRECTED ONCE DAILY      mupirocin (BACTROBAN) 2 % ointment APPLY OINTMENT ONCE DAILY TO AFFECTED AREA ON LEFT LOWER LEG      hydrocortisone (ANUSOL-HC) 2.5 % CREA rectal cream USE AS DIRECTED TWICE A DAY      azelastine (ASTELIN) 0.1 % nasal spray 1 spray 2 times daily      lisinopril (PRINIVIL;ZESTRIL) 40 MG tablet Take 1 tablet by mouth daily      metoprolol succinate (TOPROL XL) 25 MG extended release tablet Take 2 tablets by mouth daily      ASPIRIN 81 PO Take 81 mg by mouth      atorvastatin (LIPITOR) 20 MG tablet Take 1 tablet by mouth           STOP taking these medications       bethanechol (URECHOLINE) 10 MG tablet Comments:   Reason for Stopping:             Activity: activity as tolerated  Diet: regular diet and cardiac diet  Wound Care: none needed    Follow-up with Jessie Lord MD in 2 weeks.    Approximate time spent in patient care on day of discharge: 50 min     Signed:  Mike Cueva MD  2/5/2025  3:53 PM    Portions of this note were completed with Dragon, the [employer] voice recognition software.  Quite often unanticipated grammatical, syntax, homophones, and other interpretive errors are inadvertently transcribed by the computer software.  Please disregard these errors.  Efforts were made to edit the dictations but occasionally words are mis-transcribed.

## 2025-02-05 NOTE — PROGRESS NOTES
Comprehensive Nutrition Assessment    Type and Reason for Visit:  Initial    Nutrition Recommendations/Plan:   Modify diet: Regular, TOMA  Provide Glucerna once daily to aid in meeting kcal/protein needs (220 kcal, 26 g carbs, 10 g protein)     Malnutrition Assessment:  Malnutrition Status:  At risk for malnutrition (older adult with reported poor intake PTA) (02/05/25 1319)    Context:  Acute Illness     Findings of the 6 clinical characteristics of malnutrition:  Energy Intake:  Unable to assess  Weight Loss:  No weight loss     Body Fat Loss:  Mild body fat loss Triceps   Muscle Mass Loss:  Mild muscle mass loss Clavicles (pectoralis & deltoids)  Fluid Accumulation:  Unable to assess     Strength:  Not Performed    Nutrition Assessment:     Patient is a 89 year old female admitted with Shortness of breath [R06.02]  SOB (shortness of breath) [R06.02]  COPD exacerbation (HCC) [J44.1]  Respiratory failure, unspecified chronicity, unspecified whether with hypoxia or hypercapnia [J96.90]  Acute hypoxic respiratory failure [J96.01]. She  has a past medical history of Acquired absence of both cervix and uterus, Atrial fibrillation (HCC), Atrophy of vulva, Generalized anxiety disorder, Hx of bone density study, Hypertension, Postmenopausal atrophic vaginitis, Symptomatic menopausal or female climacteric states, Unspecified asthma(493.90), and Unspecified hypothyroidism.  MST for wt loss 2-13# and poor PO intake. Patient reports intake lower than normal but cannot state for exactly how long. Her fingers \"got skinny\". No clinically significant weight loss recently; has gained weight over last several months. Reports -130#. Noted allergy to peanuts. No chewing/swallowing issues. Has all own teeth per her report. Drinks Ensure at home once daily will provide with ONS. Mild/moderate wasting on physical exam but may be age-related sarcopenia.     Wt Readings from Last 10 Encounters:   02/03/25 56.7 kg (125 lb)

## 2025-02-05 NOTE — PROGRESS NOTES
Hospitalist Progress Note      NAME:  Zoey Sneed   :  10/8/1930  MRM:  [id number]    Date/Time: 2025  11:54 AM           Assessment / Plan:       # Acute hypoxic respiratory failure  # COPD exacerbation  -Patient was initially placed on 3 L nasal cannula, currently on room air  -CXR: No infiltrates  -CTA:  No acute PE   Mild bronchial wall thickening in the lower lobes. This may be related to the  patient's underlying COPD or a nonspecific bronchitis    2 mm nodule right upper lobe  -No leukocytosis patient is afebrile  Plan  -Pulmicort and DuoNeb scheduled  -Albuterol, Tessalon perles as needed  -Mucinex scheduled  -Continue azithromycin 500 mg IV daily x 5 days > can switch to po on d/c   -Continue steroid course with Solu-Medrol IV 40 mg twice daily, can wean to p.o.   -As needed NC O2 currently on RA   -PT/OT     # Acute HF  Patient is not volume overloaded on physical exam  -  -No signs of volume overload on imaging studies  -Etiology most likely secondary to ongoing COPD exacerbation  -TTE: pending  Plan  -Strict I's and O's  -Daily weights  -Salt restrictions  -Continue home meds     #Hx A-fib  -Continue home metoprolol  -Continue home Eliquis 2.5 mg p.o. daily  -Continue telemetry     #Elevated troponin  -Patient denies any chest pain involvement  -Will recheck troponin  -Follow-up on TTE  -Will consult cardiology if needed     Accelerated hypertension  Continue metoprolol, lisinopril     Hypothyroidism, POA  continue levothyroxine     Anxiety  Xanax as needed     Hyperlipidemia, POA  Continue statin      Recurrent UTI  -Pt is asymptomatic   -UA neg     #BMI (Calculated): 22.15    I have personally reviewed the radiographs, laboratory data in Epic and decisions and statements above are based partially on this personal interpretation.                 Care Plan discussed with: Patient    Discussed:  Care Plan    Prophylaxis:  Eliquis     Disposition:  Home

## 2025-02-05 NOTE — PROGRESS NOTES
Spiritual Health History and Assessment/Progress Note  [facility]    Loneliness/Social Isolation,  ,  ,      Name: Zoey Sneed MRN: [id number]    Age: 89 y.o.     Sex: female   Language: English   Restorationism: Advent   Acute hypoxic respiratory failure     Date: 2/5/2025            Total Time Calculated: 25 min              Spiritual Assessment began in [facility] A3 MULTI-SPECIALTY TELEMETRY        Referral/Consult From: Rounding   Encounter Overview/Reason: Loneliness/Social Isolation  Service Provided For: Patient    Stacey, Belief, Meaning:   Patient identifies as spiritual, is connected with a stacey tradition or spiritual practice, and has beliefs or practices that help with coping during difficult times  Family/Friends No family/friends present      Importance and Influence:  Patient has spiritual/personal beliefs that influence decisions regarding their health  Family/Friends No family/friends present    Community:  Patient are connected with a spiritual community: and feel well-supported. Support system includes: Children, Extended family, and Other: Healthcare Facility   Family/Friends     Assessment and Plan of Care:   Reviewed chart prior to bedside encounter. Ms. Sneed is sitting up in bed, greets with a smile, shares life review and her health challenges lately, and a expectation/hopeful of discharge back home to her apartment as soon as tomorrow. She expressed vibrantly and expressed much thankfulness and welcomes prayer for her healing and well being.       Patient Interventions include: Facilitated expression of thoughts and feelings, Explored spiritual coping/struggle/distress, Engaged in theological reflection, Affirmed coping skills/support systems, and Facilitated life review and/ or legacy  Family/Friends Interventions include: No family/friends present    Patient Plan of Care: Spiritual Care available upon further referral  Family/Friends Plan of Care: No family/friends

## 2025-02-06 ENCOUNTER — OFFICE VISIT (OUTPATIENT)
Age: 89
End: 2025-02-06
Payer: MEDICARE

## 2025-02-06 VITALS — SYSTOLIC BLOOD PRESSURE: 147 MMHG | DIASTOLIC BLOOD PRESSURE: 85 MMHG | HEART RATE: 91 BPM

## 2025-02-06 DIAGNOSIS — N30.90 RECURRENT CYSTITIS: Primary | ICD-10-CM

## 2025-02-06 DIAGNOSIS — R33.9 INCOMPLETE EMPTYING OF BLADDER: ICD-10-CM

## 2025-02-06 PROCEDURE — 1036F TOBACCO NON-USER: CPT | Performed by: NURSE PRACTITIONER

## 2025-02-06 PROCEDURE — 1159F MED LIST DOCD IN RCRD: CPT | Performed by: NURSE PRACTITIONER

## 2025-02-06 PROCEDURE — G8427 DOCREV CUR MEDS BY ELIG CLIN: HCPCS | Performed by: NURSE PRACTITIONER

## 2025-02-06 PROCEDURE — 99214 OFFICE O/P EST MOD 30 MIN: CPT | Performed by: NURSE PRACTITIONER

## 2025-02-06 PROCEDURE — 1111F DSCHRG MED/CURRENT MED MERGE: CPT | Performed by: NURSE PRACTITIONER

## 2025-02-06 PROCEDURE — 1090F PRES/ABSN URINE INCON ASSESS: CPT | Performed by: NURSE PRACTITIONER

## 2025-02-06 PROCEDURE — G8420 CALC BMI NORM PARAMETERS: HCPCS | Performed by: NURSE PRACTITIONER

## 2025-02-06 PROCEDURE — 1123F ACP DISCUSS/DSCN MKR DOCD: CPT | Performed by: NURSE PRACTITIONER

## 2025-02-06 RX ORDER — FLUCONAZOLE 150 MG/1
150 TABLET ORAL ONCE
Qty: 1 TABLET | Refills: 2 | Status: SHIPPED | OUTPATIENT
Start: 2025-02-06 | End: 2025-02-06

## 2025-02-06 ASSESSMENT — ENCOUNTER SYMPTOMS
COUGH: 1
WHEEZING: 0
SHORTNESS OF BREATH: 0
CHEST TIGHTNESS: 0

## 2025-02-06 NOTE — ASSESSMENT & PLAN NOTE
She can finish her course of antibiotics as prescribed.  King removed today; she has incomplete emptying at baseline which contributes to frequent infections.    Continue methenamine.  Consider suppressive abx.     I will send RX for diflucan due to her being on several antibiotics and having concern about developing a yeast infection.

## 2025-02-06 NOTE — PROGRESS NOTES
Chief Complaint   Patient presents with    Procedure     Cath Removal    1. Have you been to the ER, urgent care clinic since your last visit?  Hospitalized since your last visit?Yes When: 11/25, 12/07, 01/09, 01/30, 02/03    2. Have you seen or consulted any other health care providers outside of the [facility] since your last visit?  Include any pap smears or colon screening. No  BP (!) 147/85 (Site: Left Upper Arm, Position: Sitting, Cuff Size: Medium Adult)   Pulse 91

## 2025-02-06 NOTE — ASSESSMENT & PLAN NOTE
She has residual volumes routinely in the 400's.  She is no longer taking bethanechol.  She is doing timed voiding and is encouraged to continue this after javier removal.

## 2025-02-14 ENCOUNTER — OFFICE VISIT (OUTPATIENT)
Age: 89
End: 2025-02-14

## 2025-02-14 ENCOUNTER — TELEPHONE (OUTPATIENT)
Age: 89
End: 2025-02-14

## 2025-02-14 VITALS — DIASTOLIC BLOOD PRESSURE: 69 MMHG | HEART RATE: 87 BPM | SYSTOLIC BLOOD PRESSURE: 109 MMHG

## 2025-02-14 DIAGNOSIS — R33.9 INCOMPLETE EMPTYING OF BLADDER: Primary | ICD-10-CM

## 2025-02-14 LAB
BILIRUBIN, URINE, POC: NEGATIVE
BLOOD URINE, POC: ABNORMAL
GLUCOSE URINE, POC: NEGATIVE
KETONES, URINE, POC: NEGATIVE
LEUKOCYTE ESTERASE, URINE, POC: ABNORMAL
NITRITE, URINE, POC: NEGATIVE
PH, URINE, POC: 6.5 (ref 4.6–8)
PROTEIN,URINE, POC: NEGATIVE
PVR, POC: ABNORMAL CC
SPECIFIC GRAVITY, URINE, POC: 1.01 (ref 1–1.03)
URINALYSIS CLARITY, POC: CLEAR
URINALYSIS COLOR, POC: YELLOW
UROBILINOGEN, POC: ABNORMAL

## 2025-02-14 ASSESSMENT — ENCOUNTER SYMPTOMS: BACK PAIN: 0

## 2025-02-14 NOTE — ASSESSMENT & PLAN NOTE
Chronic, not at goal. Urinalysis today with trace Leukocytes. Sending for culture. She completed a course of antibiotics, will wait for culture to assess the need for additional course. Urine was light yellow in color; with >150 ml of PVR. 16 Fr indwelling catheter placed today without difficulty after an in-depth discussion. She will return Wednesday for appointment with Dr. Groves. We briefly discussed the need for chronic catheter if symptoms of retention and incomplete emptying continue.

## 2025-02-14 NOTE — PROGRESS NOTES
Chief Complaint   Patient presents with    Follow-up     not emptying bladder completely  very weak stream while urinating       1. Have you been to the ER, urgent care clinic since your last visit?  Hospitalized since your last visit?No    2. Have you seen or consulted any other health care providers outside of the [facility] since your last visit?  Include any pap smears or colon screening. No   /69 (Site: Right Upper Arm, Position: Sitting, Cuff Size: Medium Adult)   Pulse 87    
right CVA tenderness or left CVA tenderness.   Genitourinary:     General: Normal vulva.   Skin:     General: Skin is warm and dry.   Neurological:      General: No focal deficit present.      Mental Status: She is alert and oriented to person, place, and time.   Psychiatric:         Mood and Affect: Mood normal.     Bladder Catheterization Procedure Note    Indication: urinary retention    Procedure: The patient's urethral area was prepped with chlorhexidine.  A 16 Ivorian King catheter was inserted into the bladder using sterile technique. The balloon was inflated with 10 cc of sterile water. The catheter returned approximately 150 cc of yellow urine.    The patient tolerated the procedure well.    Complications: None    ASSESSMENT AND PLAN    Incomplete emptying of bladder  Assessment & Plan:  Chronic, not at goal. Urinalysis today with trace Leukocytes. Sending for culture. She completed a course of antibiotics, will wait for culture to assess the need for additional course. Urine was light yellow in color; with >150 ml of PVR. 16 Fr indwelling catheter placed today without difficulty after an in-depth discussion. She will return Wednesday for appointment with Dr. Groves. We briefly discussed the need for chronic catheter if symptoms of retention and incomplete emptying continue.  Orders:  -     AMB POC URINALYSIS DIP STICK AUTO W/O MICRO  -     CULTURE, URINE; Future  -     AMB POC PVR, JULES,POST-VOID RES,US,NON-IMAGING      No follow-ups on file.     VASQUEZ Pereira      Zoey Sneed may have a reminder for a \"due or due soon\" health maintenance. The patient has been encouraged to contact their primary care provider for follow-up on this health maintenance or other necessary and/or routine health screening.   
No

## 2025-02-16 LAB — BACTERIA UR CULT: ABNORMAL

## 2025-02-17 LAB — BACTERIA UR CULT: ABNORMAL

## 2025-02-17 RX ORDER — NITROFURANTOIN 25; 75 MG/1; MG/1
100 CAPSULE ORAL 2 TIMES DAILY
Qty: 14 CAPSULE | Refills: 0 | Status: SHIPPED | OUTPATIENT
Start: 2025-02-17 | End: 2025-02-24

## 2025-02-21 ENCOUNTER — APPOINTMENT (OUTPATIENT)
Facility: HOSPITAL | Age: 89
End: 2025-02-21
Payer: MEDICARE

## 2025-02-21 ENCOUNTER — HOSPITAL ENCOUNTER (EMERGENCY)
Facility: HOSPITAL | Age: 89
Discharge: ANOTHER HOSPITAL | End: 2025-02-21
Attending: STUDENT IN AN ORGANIZED HEALTH CARE EDUCATION/TRAINING PROGRAM
Payer: MEDICARE

## 2025-02-21 VITALS
WEIGHT: 118 LBS | BODY MASS INDEX: 20.91 KG/M2 | HEART RATE: 80 BPM | DIASTOLIC BLOOD PRESSURE: 86 MMHG | TEMPERATURE: 97.3 F | HEIGHT: 63 IN | OXYGEN SATURATION: 91 % | SYSTOLIC BLOOD PRESSURE: 154 MMHG | RESPIRATION RATE: 20 BRPM

## 2025-02-21 DIAGNOSIS — S22.42XA CLOSED FRACTURE OF MULTIPLE RIBS OF LEFT SIDE, INITIAL ENCOUNTER: Primary | ICD-10-CM

## 2025-02-21 LAB
ALBUMIN SERPL-MCNC: 3.6 G/DL (ref 3.5–5.2)
ALBUMIN/GLOB SERPL: 1.6 (ref 1.1–2.2)
ALP SERPL-CCNC: 92 U/L (ref 35–104)
ALT SERPL-CCNC: 43 U/L (ref 10–35)
ANION GAP SERPL CALC-SCNC: 8 MMOL/L (ref 2–12)
AST SERPL-CCNC: 39 U/L (ref 10–35)
BASOPHILS # BLD: 0.03 K/UL (ref 0–0.1)
BASOPHILS NFR BLD: 0.3 % (ref 0–1)
BILIRUB SERPL-MCNC: 0.6 MG/DL (ref 0.2–1)
BUN SERPL-MCNC: 15 MG/DL (ref 8–23)
BUN/CREAT SERPL: 27 (ref 12–20)
CALCIUM SERPL-MCNC: 8.9 MG/DL (ref 8.2–9.6)
CHLORIDE SERPL-SCNC: 102 MMOL/L (ref 98–107)
CO2 SERPL-SCNC: 27 MMOL/L (ref 22–29)
CREAT SERPL-MCNC: 0.56 MG/DL (ref 0.5–0.9)
DIFFERENTIAL METHOD BLD: ABNORMAL
EOSINOPHIL # BLD: 0.19 K/UL (ref 0–0.4)
EOSINOPHIL NFR BLD: 1.9 % (ref 0–7)
ERYTHROCYTE [DISTWIDTH] IN BLOOD BY AUTOMATED COUNT: 14.1 % (ref 11.5–14.5)
GLOBULIN SER CALC-MCNC: 2.2 G/DL (ref 2–4)
GLUCOSE SERPL-MCNC: 113 MG/DL (ref 65–100)
HCT VFR BLD AUTO: 38.8 % (ref 35–47)
HGB BLD-MCNC: 13 G/DL (ref 11.5–16)
IMM GRANULOCYTES # BLD AUTO: 0.06 K/UL (ref 0–0.04)
IMM GRANULOCYTES NFR BLD AUTO: 0.6 % (ref 0–0.5)
LYMPHOCYTES # BLD: 1.22 K/UL (ref 0.8–3.5)
LYMPHOCYTES NFR BLD: 12.3 % (ref 12–49)
MCH RBC QN AUTO: 32.3 PG (ref 26–34)
MCHC RBC AUTO-ENTMCNC: 33.5 G/DL (ref 30–36.5)
MCV RBC AUTO: 96.5 FL (ref 80–99)
MONOCYTES # BLD: 0.54 K/UL (ref 0–1)
MONOCYTES NFR BLD: 5.4 % (ref 5–13)
NEUTS SEG # BLD: 7.88 K/UL (ref 1.8–8)
NEUTS SEG NFR BLD: 79.5 % (ref 32–75)
NRBC # BLD: 0 K/UL (ref 0–0.01)
NRBC BLD-RTO: 0 PER 100 WBC
PLATELET # BLD AUTO: 168 K/UL (ref 150–400)
PMV BLD AUTO: 10.3 FL (ref 8.9–12.9)
POTASSIUM SERPL-SCNC: 4.7 MMOL/L (ref 3.5–5.1)
PROT SERPL-MCNC: 5.8 G/DL (ref 6.4–8.3)
RBC # BLD AUTO: 4.02 M/UL (ref 3.8–5.2)
SODIUM SERPL-SCNC: 137 MMOL/L (ref 136–145)
WBC # BLD AUTO: 9.9 K/UL (ref 3.6–11)

## 2025-02-21 PROCEDURE — 85025 COMPLETE CBC W/AUTO DIFF WBC: CPT

## 2025-02-21 PROCEDURE — 71250 CT THORAX DX C-: CPT

## 2025-02-21 PROCEDURE — 72125 CT NECK SPINE W/O DYE: CPT

## 2025-02-21 PROCEDURE — 70450 CT HEAD/BRAIN W/O DYE: CPT

## 2025-02-21 PROCEDURE — 80053 COMPREHEN METABOLIC PANEL: CPT

## 2025-02-21 PROCEDURE — 36415 COLL VENOUS BLD VENIPUNCTURE: CPT

## 2025-02-21 PROCEDURE — 6370000000 HC RX 637 (ALT 250 FOR IP): Performed by: STUDENT IN AN ORGANIZED HEALTH CARE EDUCATION/TRAINING PROGRAM

## 2025-02-21 PROCEDURE — 99285 EMERGENCY DEPT VISIT HI MDM: CPT

## 2025-02-21 RX ORDER — ACETAMINOPHEN 500 MG
1000 TABLET ORAL
Status: COMPLETED | OUTPATIENT
Start: 2025-02-21 | End: 2025-02-21

## 2025-02-21 RX ORDER — LIDOCAINE 4 G/G
2 PATCH TOPICAL ONCE
Status: DISCONTINUED | OUTPATIENT
Start: 2025-02-21 | End: 2025-02-22 | Stop reason: HOSPADM

## 2025-02-21 RX ORDER — OXYCODONE HYDROCHLORIDE 5 MG/1
5 TABLET ORAL
Status: COMPLETED | OUTPATIENT
Start: 2025-02-21 | End: 2025-02-21

## 2025-02-21 RX ADMIN — ACETAMINOPHEN 1000 MG: 500 TABLET ORAL at 20:06

## 2025-02-21 RX ADMIN — OXYCODONE 5 MG: 5 TABLET ORAL at 22:28

## 2025-02-21 ASSESSMENT — PAIN - FUNCTIONAL ASSESSMENT
PAIN_FUNCTIONAL_ASSESSMENT: 0-10
PAIN_FUNCTIONAL_ASSESSMENT: 0-10

## 2025-02-21 ASSESSMENT — PAIN SCALES - GENERAL
PAINLEVEL_OUTOF10: 8
PAINLEVEL_OUTOF10: 8

## 2025-02-21 ASSESSMENT — ENCOUNTER SYMPTOMS: SHORTNESS OF BREATH: 0

## 2025-02-22 NOTE — ED NOTES
SBAR given to [facility] prior to transport. Pt in gown, with PIV and belongings. EMTALA with EMS

## 2025-02-22 NOTE — ED TRIAGE NOTES
Pt in ED via EMS for GLF in bathroom about an hour PTA and hit her left ribs on bathtub. Pt said it hurts with inspiration. Pt is on blood thinners.

## 2025-02-22 NOTE — ED PROVIDER NOTES
[place] EMERGENCY DEPARTMENT  EMERGENCY DEPARTMENT ENCOUNTER      Pt Name: Zoey Sneed  MRN: [id number]  Birthdate 10/8/1930  Date of evaluation: 2/21/2025  Provider: Terry Bill MD    CHIEF COMPLAINT     No chief complaint on file.        HISTORY OF PRESENT ILLNESS   89-year-old female with history significant for A-fib on Eliquis, HTN presents to the ED with chief complaint of left anterior rib pain after ground-level fall.  Patient says she slipped in the bathroom and the left side of her ribs struck the side of the tub.  She did not hit her head or sustain any other injuries.  Denies any headache, neck pain, shortness of breath, abdominal pain, numbness, or weakness.  Rib pain worse with deep breaths.  No treatment prior to arrival.  She describes fall as mechanical, denies any lightheadedness, dizziness, nausea, vomiting, or any other symptoms.    The history is provided by the patient.       Review of External Medical Records:     Nursing Notes were reviewed.    REVIEW OF SYSTEMS       Review of Systems   Respiratory:  Negative for shortness of breath.    Cardiovascular:  Negative for chest pain.       Except as noted above the remainder of the review of systems was reviewed and negative.       PAST MEDICAL HISTORY     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    Atrial fibrillation (HCC)     Atrophy of vulva 4/22/2009    Generalized anxiety disorder     Hx of bone density study 2/24/14    Normal    Hypertension     Postmenopausal atrophic vaginitis 4/22/2009    Symptomatic menopausal or female climacteric states     Unspecified asthma(493.90)     Unspecified hypothyroidism          SURGICAL HISTORY       Past Surgical History:   Procedure Laterality Date    HYSTERECTOMY, TOTAL ABDOMINAL (CERVIX REMOVED)  1970    W/ USO    OTHER SURGICAL HISTORY      Anterior Posterior Repair    THYROIDECTOMY, PARTIAL      TONSILLECTOMY      UROLOGICAL SURGERY         Neurological:      General: No focal deficit present.      Mental Status: She is alert and oriented to person, place, and time.      Cranial Nerves: No cranial nerve deficit.      Sensory: No sensory deficit.      Motor: No weakness.   Psychiatric:         Mood and Affect: Mood normal.         Behavior: Behavior normal.         All other labs were within normal range or not returned as of this dictation.    EMERGENCY DEPARTMENT COURSE and DIFFERENTIAL DIAGNOSIS/MDM:   Vitals:  There were no vitals filed for this visit.    Medical Decision Making  89-year-old female presents to the ED with left sided rib pain after ground-level fall.  Vital signs are stable, she is fully oriented, denies any head trauma or headache or neck pain.  Exam only with left-sided rib tenderness.  Differential includes fracture, contusion, chest wall strain.  Will obtain CT of the chest to further evaluate.  Treating with Tylenol and lidocaine patches.  Dispo pending CT result.    Problems Addressed:  Closed fracture of multiple ribs of left side, initial encounter: acute illness or injury    Amount and/or Complexity of Data Reviewed  Labs: ordered.  Radiology: ordered and independent interpretation performed.     Details: Left 7 through 10th rib fractures on my read of chest CT    Risk  OTC drugs.            REASSESSMENT     ED Course as of 02/21/25 2021 Fri Feb 21, 2025 2002 X-ray with 4 rib fractures.  Discussed with patient and will transfer to [facility] for admission to trauma center. [JW]      ED Course User Index  [JW] Terry Bill MD         CONSULTS:  None    PROCEDURES:  Unless otherwise noted below, none     Procedures    CONSULT NOTE:   8:21 PM  Spoke with Dr. Lucy Real from [facility] ED  Discussed pt's hx, disposition, and available diagnostic and imaging results. Reviewed care plans. Consultant agrees with plans as outlined.  She has accepted transfer.      LABORATORY TESTS:  Labs Reviewed   CBC WITH AUTO

## 2025-02-22 NOTE — ED NOTES
TRANSFER - OUT REPORT:    Verbal report given to Clint DEL REAL on Zoey Sneed  being transferred to [facility] ED for routine progression of patient care       Report consisted of patient's Situation, Background, Assessment and   Recommendations(SBAR).     Information from the following report(s) ED Encounter Summary, Intake/Output, MAR, Recent Results, Neuro Assessment, and Event Log was reviewed with the receiving nurse.    [place] Fall Assessment:    Presents to emergency department  because of falls (Syncope, seizure, or loss of consciousness): Yes  Age > 70: Yes  Altered Mental Status, Intoxication with alcohol or substance confusion (Disorientation, impaired judgment, poor safety awaremess, or inability to follow instructions): No  Impaired Mobility: Ambulates or transfers with assistive devices or assistance; Unable to ambulate or transer.: Yes  Nursing Judgement: Yes          Lines:   Peripheral IV 02/21/25 Left Antecubital (Active)        Opportunity for questions and clarification was provided.      Patient transported with:  O2 @ 1lpm

## 2025-02-26 ENCOUNTER — OFFICE VISIT (OUTPATIENT)
Age: 89
End: 2025-02-26
Payer: MEDICARE

## 2025-02-26 VITALS
HEIGHT: 63 IN | WEIGHT: 118 LBS | HEART RATE: 85 BPM | DIASTOLIC BLOOD PRESSURE: 61 MMHG | BODY MASS INDEX: 20.91 KG/M2 | SYSTOLIC BLOOD PRESSURE: 106 MMHG

## 2025-02-26 DIAGNOSIS — R33.9 INCOMPLETE EMPTYING OF BLADDER: Primary | ICD-10-CM

## 2025-02-26 DIAGNOSIS — N30.90 RECURRENT CYSTITIS: ICD-10-CM

## 2025-02-26 PROCEDURE — 1036F TOBACCO NON-USER: CPT | Performed by: UROLOGY

## 2025-02-26 PROCEDURE — 99214 OFFICE O/P EST MOD 30 MIN: CPT | Performed by: UROLOGY

## 2025-02-26 PROCEDURE — 1125F AMNT PAIN NOTED PAIN PRSNT: CPT | Performed by: UROLOGY

## 2025-02-26 PROCEDURE — 1159F MED LIST DOCD IN RCRD: CPT | Performed by: UROLOGY

## 2025-02-26 PROCEDURE — G8420 CALC BMI NORM PARAMETERS: HCPCS | Performed by: UROLOGY

## 2025-02-26 PROCEDURE — 1090F PRES/ABSN URINE INCON ASSESS: CPT | Performed by: UROLOGY

## 2025-02-26 PROCEDURE — G8427 DOCREV CUR MEDS BY ELIG CLIN: HCPCS | Performed by: UROLOGY

## 2025-02-26 PROCEDURE — 1123F ACP DISCUSS/DSCN MKR DOCD: CPT | Performed by: UROLOGY

## 2025-02-26 PROCEDURE — 1111F DSCHRG MED/CURRENT MED MERGE: CPT | Performed by: UROLOGY

## 2025-02-26 RX ORDER — OXYCODONE HYDROCHLORIDE 5 MG/1
5 TABLET ORAL
COMMUNITY
Start: 2025-02-24

## 2025-02-26 RX ORDER — BETHANECHOL CHLORIDE 50 MG/1
50 TABLET ORAL 4 TIMES DAILY
Qty: 120 TABLET | Refills: 3 | Status: SHIPPED | OUTPATIENT
Start: 2025-02-26

## 2025-02-26 RX ORDER — SILODOSIN 4 MG/1
4 CAPSULE ORAL EVERY EVENING
Qty: 60 CAPSULE | Refills: 0 | Status: SHIPPED | OUTPATIENT
Start: 2025-02-26

## 2025-02-26 ASSESSMENT — ENCOUNTER SYMPTOMS: SHORTNESS OF BREATH: 1

## 2025-02-26 NOTE — PROGRESS NOTES
HISTORY OF PRESENT ILLNESS  Zoey Sneed is a 89 y.o. female.   has a past medical history of Acquired absence of both cervix and uterus, Atrial fibrillation (HCC), Atrophy of vulva, Generalized anxiety disorder, Hx of bone density study, Hypertension, Postmenopausal atrophic vaginitis, Symptomatic menopausal or female climacteric states, Unspecified asthma(493.90), and Unspecified hypothyroidism.  has a past surgical history that includes Thyroidectomy, partial; Hysterectomy, total abdominal (1970); Tonsillectomy; Urological Surgery; and other surgical history.  Chief Complaint   Patient presents with    Follow-up     She is seen with her daughter.  She was seen in the ER on 1/30/25 with cc: urinary retention.  She was not in pain, but reported drinking a lot of water with little output.  She reported that she was only voiding in small dribbles and was scanned for a volume of greater than 500 cc per the MD report.  A javier catheter was placed and she was discharged home on nitrofurantoin.     Culture reviewed; E coli; pan-sensitive.     She is on methenamine bid.  Her med list shows a current RX for nitrofurantoin since 1/31/25 scheduled to finish tomorrow and azithromycin to finish 2/8/25.     Bethanechol is no longer on her med list.     She was admitted to [facility] for COPD exacerbation and discharged yesterday.  Javier was removed 2/6/25 but she had decreased output and it was replaced 2/14/25.  On nitrofurantoin 2/17 for 14 days.      She fell 2/21 and broke some ribs.  She had a CT which mentioned bladder thickening.            1. Incomplete emptying of bladder  Overview:  Unable to void with 150 cc to >450 cc in the bladder on evaluation.  She was advised on timed voiding and started on bethanechol.  She has had multiple issues with side effects and has not been able to consistently take the medication but does try.      She has managed with a javier short term, but ultimately begins to feel like she is not voiding

## 2025-02-26 NOTE — ASSESSMENT & PLAN NOTE
King d/c'd today.  At risk for recurrent retention.    Finish nitrofurantoin.    I will add rapaflo (allergy to sulfa).   Continue bethanechol qid.

## 2025-02-26 NOTE — PROGRESS NOTES
Chief Complaint   Patient presents with    Follow-up     1. Have you been to the ER, urgent care clinic since your last visit?  Hospitalized since your last visit?Yes When: 2/21/25 Where: [facility] Reason for visit: Fell down    2. Have you seen or consulted any other health care providers outside of the [facility] since your last visit?  Include any pap smears or colon screening. No  /61   Pulse 85   Ht 1.6 m (5' 3\")   Wt 53.5 kg (118 lb)   BMI 20.90 kg/m²

## 2025-03-03 DIAGNOSIS — N30.90 RECURRENT CYSTITIS: ICD-10-CM

## 2025-03-03 RX ORDER — METHENAMINE HIPPURATE 1000 MG/1
1 TABLET ORAL 2 TIMES DAILY WITH MEALS
Qty: 60 TABLET | Refills: 3 | Status: SHIPPED | OUTPATIENT
Start: 2025-03-03

## 2025-03-26 ENCOUNTER — HOSPITAL ENCOUNTER (OUTPATIENT)
Facility: HOSPITAL | Age: 89
Discharge: HOME | End: 2025-03-29
Payer: MEDICARE

## 2025-03-26 ENCOUNTER — TRANSCRIBE ORDERS (OUTPATIENT)
Facility: HOSPITAL | Age: 89
End: 2025-03-26

## 2025-03-26 DIAGNOSIS — J45.901 ASTHMA WITH ACUTE EXACERBATION, UNSPECIFIED ASTHMA SEVERITY, UNSPECIFIED WHETHER PERSISTENT: ICD-10-CM

## 2025-03-26 DIAGNOSIS — J45.901 ASTHMA WITH ACUTE EXACERBATION, UNSPECIFIED ASTHMA SEVERITY, UNSPECIFIED WHETHER PERSISTENT: Primary | ICD-10-CM

## 2025-03-26 PROCEDURE — 71046 X-RAY EXAM CHEST 2 VIEWS: CPT

## 2025-04-18 NOTE — TELEPHONE ENCOUNTER
New standings orders are in, please update pt/daughter   Patient with minor wound on leg. Culture showed only mixed skin brandon and clinically insignificant. Spoke with patient earlier and conveyed these results. Encourage to follow-up in [address] [place] [address] if there is ongoing concern.

## 2025-04-25 RX ORDER — SILODOSIN 4 MG/1
CAPSULE ORAL
Qty: 60 CAPSULE | Refills: 0 | Status: SHIPPED | OUTPATIENT
Start: 2025-04-25

## 2025-04-30 ENCOUNTER — TRANSCRIBE ORDERS (OUTPATIENT)
Facility: HOSPITAL | Age: 89
End: 2025-04-30

## 2025-04-30 DIAGNOSIS — M47.817 LUMBAR AND SACRAL ARTHRITIS: ICD-10-CM

## 2025-04-30 DIAGNOSIS — M47.816 LUMBAR FACET ARTHROPATHY: ICD-10-CM

## 2025-04-30 DIAGNOSIS — M54.16 RIGHT LUMBAR RADICULOPATHY: Primary | ICD-10-CM

## 2025-05-02 ENCOUNTER — HOSPITAL ENCOUNTER (OUTPATIENT)
Facility: HOSPITAL | Age: 89
Discharge: HOME | End: 2025-05-02
Payer: MEDICARE

## 2025-05-02 DIAGNOSIS — M54.16 RIGHT LUMBAR RADICULOPATHY: ICD-10-CM

## 2025-05-02 DIAGNOSIS — M47.816 LUMBAR FACET ARTHROPATHY: ICD-10-CM

## 2025-05-02 DIAGNOSIS — M47.817 LUMBAR AND SACRAL ARTHRITIS: ICD-10-CM

## 2025-05-02 PROCEDURE — 72148 MRI LUMBAR SPINE W/O DYE: CPT

## 2025-05-07 ENCOUNTER — OFFICE VISIT (OUTPATIENT)
Age: 89
End: 2025-05-07

## 2025-05-07 VITALS
BODY MASS INDEX: 20.91 KG/M2 | HEART RATE: 85 BPM | DIASTOLIC BLOOD PRESSURE: 58 MMHG | WEIGHT: 118 LBS | HEIGHT: 63 IN | OXYGEN SATURATION: 96 % | SYSTOLIC BLOOD PRESSURE: 114 MMHG

## 2025-05-07 DIAGNOSIS — N30.90 RECURRENT CYSTITIS: Primary | ICD-10-CM

## 2025-05-07 DIAGNOSIS — R33.9 INCOMPLETE EMPTYING OF BLADDER: ICD-10-CM

## 2025-05-07 DIAGNOSIS — N95.2 ATROPHIC VAGINITIS: ICD-10-CM

## 2025-05-07 RX ORDER — ESTRADIOL 0.1 MG/G
1 CREAM VAGINAL
Qty: 42.5 G | Refills: 3 | Status: SHIPPED | OUTPATIENT
Start: 2025-05-08

## 2025-05-07 RX ORDER — METHENAMINE HIPPURATE 1000 MG/1
1 TABLET ORAL 2 TIMES DAILY WITH MEALS
Qty: 180 TABLET | Refills: 3 | Status: SHIPPED | OUTPATIENT
Start: 2025-05-07

## 2025-05-07 RX ORDER — SILODOSIN 4 MG/1
8 CAPSULE ORAL EVERY EVENING
Qty: 90 CAPSULE | Refills: 3 | Status: SHIPPED | OUTPATIENT
Start: 2025-06-02

## 2025-05-07 ASSESSMENT — ENCOUNTER SYMPTOMS: SHORTNESS OF BREATH: 1

## 2025-05-07 NOTE — PROGRESS NOTES
Chief Complaint   Patient presents with    6 Month Follow-Up     1. Have you been to the ER, urgent care clinic since your last visit?  Hospitalized since your last visit?No    2. Have you seen or consulted any other health care providers outside of the [facility] since your last visit?  Include any pap smears or colon screening. No  BP (!) 114/58   Pulse 85   Ht 1.6 m (5' 3\")   Wt 53.5 kg (118 lb)   SpO2 96%   BMI 20.90 kg/m²

## 2025-05-07 NOTE — PROGRESS NOTES
HISTORY OF PRESENT ILLNESS  Zoey Sneed is a 89 y.o. female.   has a past medical history of Acquired absence of both cervix and uterus, Atrial fibrillation (HCC), Atrophy of vulva, Generalized anxiety disorder, Hx of bone density study, Hypertension, Postmenopausal atrophic vaginitis, Symptomatic menopausal or female climacteric states, Unspecified asthma(493.90), and Unspecified hypothyroidism.  has a past surgical history that includes Thyroidectomy, partial; Hysterectomy, total abdominal (1970); Tonsillectomy; Urological Surgery; and other surgical history.  Chief Complaint   Patient presents with    6 Month Follow-Up     She has incomplete emptying and h/o recurrent UTIs. She is on timed voiding, silodosin and bethanechol.  She takes methenamine.     Variable voiding volumes.  Her daughter thinks it helps her go more.      She has not had a UTI since last visit.  She is doing better on the current regimen.          1. Recurrent cystitis  Overview:  Recurrent cystitis with incomplete emptying. She is on daily methenamine hippurate.  Assessment & Plan:  No infections on her current regimen.  Continue medications.   Orders:  -     methenamine (HIPREX) 1 g tablet; Take 1 tablet by mouth 2 times daily (with meals), Disp-180 tablet, R-3Normal  2. Incomplete emptying of bladder  Overview:  Unable to void with 150 cc to >450 cc in the bladder on evaluation.  She was advised on timed voiding and started on bethanechol.  She has had multiple issues with side effects and has not been able to consistently take the medication but does try.      She has managed with a javier short term, but ultimately begins to feel like she is not voiding or does not void with removal.  Silodosin was added on 2/26/25.   Assessment & Plan:     Orders:  -     silodosin (RAPAFLO) 4 MG CAPS capsule; Take 2 capsules by mouth every evening, Disp-90 capsule, R-3Normal  3. Atrophic vaginitis  Overview:  On Estrace cream  Assessment & Plan:  She is

## 2025-05-10 ENCOUNTER — APPOINTMENT (OUTPATIENT)
Facility: HOSPITAL | Age: 89
End: 2025-05-10
Payer: MEDICARE

## 2025-05-10 ENCOUNTER — HOSPITAL ENCOUNTER (EMERGENCY)
Facility: HOSPITAL | Age: 89
Discharge: HOME | End: 2025-05-10
Attending: EMERGENCY MEDICINE
Payer: MEDICARE

## 2025-05-10 VITALS
DIASTOLIC BLOOD PRESSURE: 78 MMHG | TEMPERATURE: 98.5 F | OXYGEN SATURATION: 94 % | SYSTOLIC BLOOD PRESSURE: 189 MMHG | HEART RATE: 92 BPM | RESPIRATION RATE: 19 BRPM | BODY MASS INDEX: 20.91 KG/M2 | WEIGHT: 118 LBS | HEIGHT: 63 IN

## 2025-05-10 DIAGNOSIS — S83.91XA SPRAIN OF RIGHT KNEE, UNSPECIFIED LIGAMENT, INITIAL ENCOUNTER: Primary | ICD-10-CM

## 2025-05-10 DIAGNOSIS — R60.0 LOWER EXTREMITY EDEMA: ICD-10-CM

## 2025-05-10 LAB
ALBUMIN SERPL-MCNC: 3.9 G/DL (ref 3.5–5.2)
ALBUMIN/GLOB SERPL: 1.6 (ref 1.1–2.2)
ALP SERPL-CCNC: 85 U/L (ref 35–104)
ALT SERPL-CCNC: <5 U/L (ref 10–35)
ANION GAP SERPL CALC-SCNC: 10 MMOL/L (ref 2–12)
AST SERPL-CCNC: 19 U/L (ref 10–35)
BASOPHILS # BLD: 0.04 K/UL (ref 0–0.1)
BASOPHILS NFR BLD: 0.6 % (ref 0–1)
BILIRUB SERPL-MCNC: 0.7 MG/DL (ref 0.2–1)
BUN SERPL-MCNC: 13 MG/DL (ref 8–23)
BUN/CREAT SERPL: 25 (ref 12–20)
CALCIUM SERPL-MCNC: 9.2 MG/DL (ref 8.2–9.6)
CHLORIDE SERPL-SCNC: 102 MMOL/L (ref 98–107)
CO2 SERPL-SCNC: 26 MMOL/L (ref 22–29)
CREAT SERPL-MCNC: 0.53 MG/DL (ref 0.5–0.9)
DIFFERENTIAL METHOD BLD: NORMAL
ECHO BSA: 1.54 M2
EOSINOPHIL # BLD: 0.39 K/UL (ref 0–0.4)
EOSINOPHIL NFR BLD: 6.2 % (ref 0–7)
ERYTHROCYTE [DISTWIDTH] IN BLOOD BY AUTOMATED COUNT: 13 % (ref 11.5–14.5)
GLOBULIN SER CALC-MCNC: 2.5 G/DL (ref 2–4)
GLUCOSE SERPL-MCNC: 105 MG/DL (ref 65–100)
HCT VFR BLD AUTO: 40.4 % (ref 35–47)
HGB BLD-MCNC: 13.6 G/DL (ref 11.5–16)
IMM GRANULOCYTES # BLD AUTO: 0.02 K/UL (ref 0–0.04)
IMM GRANULOCYTES NFR BLD AUTO: 0.3 % (ref 0–0.5)
LYMPHOCYTES # BLD: 1.95 K/UL (ref 0.8–3.5)
LYMPHOCYTES NFR BLD: 31 % (ref 12–49)
MCH RBC QN AUTO: 31.9 PG (ref 26–34)
MCHC RBC AUTO-ENTMCNC: 33.7 G/DL (ref 30–36.5)
MCV RBC AUTO: 94.8 FL (ref 80–99)
MONOCYTES # BLD: 0.56 K/UL (ref 0–1)
MONOCYTES NFR BLD: 8.9 % (ref 5–13)
NEUTS SEG # BLD: 3.34 K/UL (ref 1.8–8)
NEUTS SEG NFR BLD: 53 % (ref 32–75)
NRBC # BLD: 0 K/UL (ref 0–0.01)
NRBC BLD-RTO: 0 PER 100 WBC
NT PRO BNP: 800 PG/ML
PLATELET # BLD AUTO: 209 K/UL (ref 150–400)
PMV BLD AUTO: 10.5 FL (ref 8.9–12.9)
POTASSIUM SERPL-SCNC: 4 MMOL/L (ref 3.5–5.1)
PROT SERPL-MCNC: 6.4 G/DL (ref 6.4–8.3)
RBC # BLD AUTO: 4.26 M/UL (ref 3.8–5.2)
SODIUM SERPL-SCNC: 138 MMOL/L (ref 136–145)
WBC # BLD AUTO: 6.3 K/UL (ref 3.6–11)

## 2025-05-10 PROCEDURE — 71045 X-RAY EXAM CHEST 1 VIEW: CPT

## 2025-05-10 PROCEDURE — 85025 COMPLETE CBC W/AUTO DIFF WBC: CPT

## 2025-05-10 PROCEDURE — 99284 EMERGENCY DEPT VISIT MOD MDM: CPT

## 2025-05-10 PROCEDURE — 80053 COMPREHEN METABOLIC PANEL: CPT

## 2025-05-10 PROCEDURE — 73562 X-RAY EXAM OF KNEE 3: CPT

## 2025-05-10 PROCEDURE — 36415 COLL VENOUS BLD VENIPUNCTURE: CPT

## 2025-05-10 PROCEDURE — 83880 ASSAY OF NATRIURETIC PEPTIDE: CPT

## 2025-05-10 PROCEDURE — 93970 EXTREMITY STUDY: CPT

## 2025-05-10 ASSESSMENT — ENCOUNTER SYMPTOMS
NAUSEA: 0
VOMITING: 0
SHORTNESS OF BREATH: 0
COLOR CHANGE: 0
BACK PAIN: 0
CONSTIPATION: 0
ABDOMINAL PAIN: 0
DIARRHEA: 0

## 2025-05-10 ASSESSMENT — PAIN DESCRIPTION - LOCATION: LOCATION: KNEE

## 2025-05-10 ASSESSMENT — PAIN SCALES - GENERAL: PAINLEVEL_OUTOF10: 2

## 2025-05-10 ASSESSMENT — PAIN - FUNCTIONAL ASSESSMENT: PAIN_FUNCTIONAL_ASSESSMENT: 0-10

## 2025-05-10 NOTE — ED TRIAGE NOTES
Pt arrives via EMS w/ cc of bilateral feet swelling. Pt states she had a near- fall two days ago, and might have injured her R knee.   Pt walks with a walker at baseline.     No acute distress noted in triage. A&O x 4. Skin is warm, dry & intact on obs.

## 2025-05-10 NOTE — ED PROVIDER NOTES
study 2/24/14    Normal    Hypertension     Postmenopausal atrophic vaginitis 4/22/2009    Symptomatic menopausal or female climacteric states     Unspecified asthma(493.90)     Unspecified hypothyroidism          SURGICAL HISTORY       Past Surgical History:   Procedure Laterality Date    HYSTERECTOMY, TOTAL ABDOMINAL (CERVIX REMOVED)  1970    W/ USO    OTHER SURGICAL HISTORY      Anterior Posterior Repair    THYROIDECTOMY, PARTIAL      TONSILLECTOMY      UROLOGICAL SURGERY      TVT         CURRENT MEDICATIONS       Previous Medications    ALBUTEROL (PROVENTIL) (2.5 MG/3ML) 0.083% NEBULIZER SOLUTION    Take 3 mLs by nebulization 4 times daily as needed for Wheezing    ALBUTEROL SULFATE HFA (VENTOLIN HFA) 108 (90 BASE) MCG/ACT INHALER    Inhale 2 puffs into the lungs 4 times daily as needed for Wheezing    ALPRAZOLAM (XANAX) 0.5 MG TABLET    Take 1 tablet by mouth 2 times daily.    APIXABAN (ELIQUIS) 5 MG TABS TABLET    Take 0.5 tablets by mouth 2 times daily    ASPIRIN 81 PO    Take 81 mg by mouth    ATORVASTATIN (LIPITOR) 20 MG TABLET    Take 1 tablet by mouth    AZELASTINE (ASTELIN) 0.1 % NASAL SPRAY    1 spray 2 times daily    BETHANECHOL (URECHOLINE) 50 MG TABLET    Take 1 tablet by mouth 4 times daily    BUDESONIDE-FORMOTEROL (SYMBICORT) 160-4.5 MCG/ACT AERO    Inhale 2 puffs into the lungs 2 times daily    ESTRADIOL (ESTRACE VAGINAL) 0.1 MG/GM VAGINAL CREAM    Place 1 g vaginally Twice a Week    HYDROCORTISONE (ANUSOL-HC) 2.5 % CREA RECTAL CREAM    USE AS DIRECTED TWICE A DAY    LEVOTHYROXINE (SYNTHROID) 88 MCG TABLET    Take 75 mcg by mouth every morning    LISINOPRIL (PRINIVIL;ZESTRIL) 40 MG TABLET    Take 1 tablet by mouth daily    METAMUCIL SMOOTH TEXTURE 58.6 % POWDER    USE 1 SCOOP AS DIRECTED ONCE DAILY    METHENAMINE (HIPREX) 1 G TABLET    Take 1 tablet by mouth 2 times daily (with meals)    METOPROLOL SUCCINATE (TOPROL XL) 25 MG EXTENDED RELEASE TABLET    Take 2 tablets by mouth daily    MIRTAZAPINE  Ultrasound and MRI are read by the radiologist. Plain radiographic images are visualized and preliminarily interpreted by the emergency physician with the below findings:        Interpretation per the Radiologist below, if available at the time of this note:    No orders to display         ED BEDSIDE ULTRASOUND:   Performed by ED Physician - none    LABS:  Labs Reviewed - No data to display    All other labs were within normal range or not returned as of this dictation.    EMERGENCY DEPARTMENT COURSE and DIFFERENTIAL DIAGNOSIS/MDM:   Vitals:    Vitals:    05/10/25 0741 05/10/25 0748   BP:  (!) 215/100   Pulse: 98    Resp: 18    Temp: 98.5 °F (36.9 °C)    TempSrc: Oral    SpO2: 95%    Weight: 53.5 kg (118 lb)    Height: 1.6 m (5' 3\")            Medical Decision Making  Amount and/or Complexity of Data Reviewed  Labs: ordered.  Radiology: ordered.    This is a 89-year-old female with past medical history, review of systems, physical exam as above presenting with complaints of bilateral lower extremity edema, right knee pain.  She states approximately 4 days worth of symptoms.  She states she \"bent my knee back\", while walking several days ago.  She denies increase in shortness of breath, noting history of COPD.  She denies a history of heart failure, or DVT, noting that she is anticoagulated for A-fib.  She is extremely hypertensive on arrival, blood pressure improving at the time of evaluation.  She is afebrile without tachycardia, satting well on room air.  She is awake and alert in no acute distress, with trace bilateral lower extremity edema, decreased range of motion of the right knee, chronic arthritic changes noted without obvious deformity, distal pulse motor and sensation intact.  She has regular rate, irregular rhythm, transmitted upper airway sounds to auscultation.  Discussed with patient differential, including hypoalbuminemia, JUWAN, CHF, DVT.  Plan to obtain CMP, CBC, BMP, Doppler study, right knee films,

## 2025-05-10 NOTE — ED NOTES
Arely Blanco called, this RN informed them patient was returning to her assisted living apartment via wheelchair van through hospital to home transport company.  Pt is dressed and waiting for ride in her room.

## 2025-05-11 ENCOUNTER — APPOINTMENT (OUTPATIENT)
Facility: HOSPITAL | Age: 89
End: 2025-05-11
Payer: MEDICARE

## 2025-05-11 ENCOUNTER — HOSPITAL ENCOUNTER (EMERGENCY)
Facility: HOSPITAL | Age: 89
Discharge: HOME | End: 2025-05-11
Attending: EMERGENCY MEDICINE
Payer: MEDICARE

## 2025-05-11 VITALS
OXYGEN SATURATION: 97 % | DIASTOLIC BLOOD PRESSURE: 67 MMHG | SYSTOLIC BLOOD PRESSURE: 131 MMHG | BODY MASS INDEX: 20.91 KG/M2 | HEIGHT: 63 IN | WEIGHT: 118 LBS | HEART RATE: 76 BPM | TEMPERATURE: 98.9 F | RESPIRATION RATE: 16 BRPM
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
## 2025-05-11 DIAGNOSIS — M25.461 EFFUSION OF RIGHT KNEE: ICD-10-CM

## 2025-05-11 PROCEDURE — 99284 EMERGENCY DEPT VISIT MOD MDM: CPT

## 2025-05-11 PROCEDURE — 73700 CT LOWER EXTREMITY W/O DYE: CPT

## 2025-05-11 PROCEDURE — 72125 CT NECK SPINE W/O DYE: CPT

## 2025-05-11 PROCEDURE — 70450 CT HEAD/BRAIN W/O DYE: CPT

## 2025-05-11 ASSESSMENT — PAIN - FUNCTIONAL ASSESSMENT: PAIN_FUNCTIONAL_ASSESSMENT: NONE - DENIES PAIN

## 2025-05-11 NOTE — ED PROVIDER NOTES
[facility] EMERGENCY DEPARTMENT  EMERGENCY DEPARTMENT ENCOUNTER      Pt Name: Zoey Sneed  MRN: [id number]  Birthdate 10/8/1930  Date of evaluation: 5/11/2025  Provider: James Gore MD      HISTORY OF PRESENT ILLNESS      89-year-old female with history of hypertension, atrial fibrillation on blood thinners presents to the emergency department for ground-level fall.  She reports that she fell backwards hitting the back of her head without LOC.  Complains of a mild headache.  She also was seen yesterday in the emergency department after a fall onto her right knee and has continued right knee pain.    The history is provided by the patient and medical records.           Nursing Notes were reviewed.    REVIEW OF SYSTEMS         Review of Systems        PAST MEDICAL HISTORY     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    Atrial fibrillation (HCC)     Atrophy of vulva 4/22/2009    Generalized anxiety disorder     Hx of bone density study 2/24/14    Normal    Hypertension     Postmenopausal atrophic vaginitis 4/22/2009    Symptomatic menopausal or female climacteric states     Unspecified asthma(493.90)     Unspecified hypothyroidism          SURGICAL HISTORY       Past Surgical History:   Procedure Laterality Date    HYSTERECTOMY, TOTAL ABDOMINAL (CERVIX REMOVED)  1970    W/ USO    OTHER SURGICAL HISTORY      Anterior Posterior Repair    THYROIDECTOMY, PARTIAL      TONSILLECTOMY      UROLOGICAL SURGERY      TVT         CURRENT MEDICATIONS       Previous Medications    ALBUTEROL (PROVENTIL) (2.5 MG/3ML) 0.083% NEBULIZER SOLUTION    Take 3 mLs by nebulization 4 times daily as needed for Wheezing    ALBUTEROL SULFATE HFA (VENTOLIN HFA) 108 (90 BASE) MCG/ACT INHALER    Inhale 2 puffs into the lungs 4 times daily as needed for Wheezing    ALPRAZOLAM (XANAX) 0.5 MG TABLET    Take 1 tablet by mouth 2 times daily.    APIXABAN (ELIQUIS) 5 MG TABS TABLET    Take 0.5

## 2025-05-11 NOTE — ED TRIAGE NOTES
Pt arrived via EMS from [facility] for evaluation post GLF. Pt was standing and leg gave out, causing pt to fall and hit posterior head. Pt denies pain or LOC. Is on blood thinners. Only complaint at this time is R knee pain from previous encounter.

## 2025-05-11 NOTE — ED NOTES
Ace wrap applied to right knee. Patient does not appear to be in any acute distress/shows no evidence of clinical instability at this time.     Provider has reviewed discharge instructions with the patient/family.  The patient/family verbalized understanding instructions as well as need for follow up for any further symptoms.     Discharge papers given, education provided, and any questions answered. Patient discharged by provider.

## 2025-05-28 DIAGNOSIS — R33.9 INCOMPLETE EMPTYING OF BLADDER: ICD-10-CM

## 2025-05-29 RX ORDER — SILODOSIN 4 MG/1
4 CAPSULE ORAL EVERY EVENING
Qty: 60 CAPSULE | Refills: 0 | Status: SHIPPED | OUTPATIENT
Start: 2025-05-29

## 2025-06-14 ENCOUNTER — HOSPITAL ENCOUNTER (EMERGENCY)
Facility: HOSPITAL | Age: 89
Discharge: HOME | End: 2025-06-14
Attending: STUDENT IN AN ORGANIZED HEALTH CARE EDUCATION/TRAINING PROGRAM
Payer: MEDICARE

## 2025-06-14 VITALS
DIASTOLIC BLOOD PRESSURE: 86 MMHG | SYSTOLIC BLOOD PRESSURE: 163 MMHG | HEART RATE: 84 BPM | RESPIRATION RATE: 18 BRPM | TEMPERATURE: 98 F | HEIGHT: 64 IN | BODY MASS INDEX: 20.41 KG/M2 | OXYGEN SATURATION: 96 %

## 2025-06-14 DIAGNOSIS — M54.31 SCIATICA OF RIGHT SIDE: Primary | ICD-10-CM

## 2025-06-14 LAB
APPEARANCE UR: CLEAR
BACTERIA URNS QL MICRO: ABNORMAL /HPF
BILIRUB UR QL: NEGATIVE
COLOR UR: ABNORMAL
EPITH CASTS URNS QL MICRO: ABNORMAL /LPF
GLUCOSE UR STRIP.AUTO-MCNC: NEGATIVE MG/DL
HGB UR QL STRIP: ABNORMAL
KETONES UR QL STRIP.AUTO: NEGATIVE MG/DL
LEUKOCYTE ESTERASE UR QL STRIP.AUTO: NEGATIVE
NITRITE UR QL STRIP.AUTO: NEGATIVE
PH UR STRIP: 7 (ref 5–8)
PROT UR STRIP-MCNC: NEGATIVE MG/DL
RBC #/AREA URNS HPF: ABNORMAL /HPF
SP GR UR REFRACTOMETRY: 1.01 (ref 1–1.03)
URINE CULTURE IF INDICATED: ABNORMAL
UROBILINOGEN UR QL STRIP.AUTO: 0.2 EU/DL (ref 0.2–1)
WBC URNS QL MICRO: ABNORMAL /HPF (ref 0–4)

## 2025-06-14 PROCEDURE — 99283 EMERGENCY DEPT VISIT LOW MDM: CPT

## 2025-06-14 PROCEDURE — 6370000000 HC RX 637 (ALT 250 FOR IP): Performed by: STUDENT IN AN ORGANIZED HEALTH CARE EDUCATION/TRAINING PROGRAM

## 2025-06-14 PROCEDURE — 81001 URINALYSIS AUTO W/SCOPE: CPT

## 2025-06-14 RX ORDER — LIDOCAINE 4 G/G
1 PATCH TOPICAL DAILY
Qty: 30 PATCH | Refills: 0 | Status: SHIPPED | OUTPATIENT
Start: 2025-06-14 | End: 2025-07-14

## 2025-06-14 RX ORDER — PREDNISONE 50 MG/1
50 TABLET ORAL DAILY
Qty: 5 TABLET | Refills: 0 | Status: SHIPPED | OUTPATIENT
Start: 2025-06-14 | End: 2025-06-19

## 2025-06-14 RX ORDER — LIDOCAINE 4 G/G
1 PATCH TOPICAL DAILY
Status: DISCONTINUED | OUTPATIENT
Start: 2025-06-14 | End: 2025-06-14 | Stop reason: HOSPADM

## 2025-06-14 RX ORDER — ACETAMINOPHEN 500 MG
1000 TABLET ORAL
Status: COMPLETED | OUTPATIENT
Start: 2025-06-14 | End: 2025-06-14

## 2025-06-14 RX ORDER — ACETAMINOPHEN 325 MG/1
650 TABLET ORAL EVERY 6 HOURS PRN
Qty: 120 TABLET | Refills: 0 | Status: SHIPPED | OUTPATIENT
Start: 2025-06-14

## 2025-06-14 RX ADMIN — ACETAMINOPHEN 1000 MG: 500 TABLET ORAL at 08:14

## 2025-06-14 RX ADMIN — PREDNISONE 50 MG: 20 TABLET ORAL at 08:14

## 2025-06-14 ASSESSMENT — PAIN DESCRIPTION - LOCATION
LOCATION: HIP
LOCATION: HIP

## 2025-06-14 ASSESSMENT — PAIN DESCRIPTION - DESCRIPTORS
DESCRIPTORS: PATIENT UNABLE TO DESCRIBE
DESCRIPTORS: ACHING

## 2025-06-14 ASSESSMENT — PAIN DESCRIPTION - ORIENTATION
ORIENTATION: RIGHT
ORIENTATION: RIGHT

## 2025-06-14 ASSESSMENT — PAIN SCALES - GENERAL
PAINLEVEL_OUTOF10: 2
PAINLEVEL_OUTOF10: 2

## 2025-06-14 ASSESSMENT — PAIN - FUNCTIONAL ASSESSMENT
PAIN_FUNCTIONAL_ASSESSMENT: 0-10
PAIN_FUNCTIONAL_ASSESSMENT: 0-10

## 2025-06-14 ASSESSMENT — PAIN DESCRIPTION - FREQUENCY: FREQUENCY: CONTINUOUS

## 2025-06-14 ASSESSMENT — PAIN DESCRIPTION - PAIN TYPE: TYPE: ACUTE PAIN

## 2025-06-14 NOTE — ED TRIAGE NOTES
Pt arrives via [facility] from [facility] with cc of R hip pain. Pt denies blood thinners or GLF. Hx of Sciatica. Pt ambulatory using rollator at baseline.    Pt A&Ox4 upon arrival.     Pt denies use of OTC medications PTA.    Hx of HTN.

## 2025-06-14 NOTE — DISCHARGE INSTRUCTIONS
Please return to an emergency department for further evaluation if you experience inability to walk, muscle weakness, bowel/bladder dysfunction, numbness in your groin, fever, blood in your urine, or other new and concerning symptom.

## 2025-06-14 NOTE — ED PROVIDER NOTES
[place] EMERGENCY DEPARTMENT  EMERGENCY DEPARTMENT ENCOUNTER      Pt Name: Zoey Sneed  MRN: [id number]  Birthdate 10/8/1930  Date of evaluation: 6/14/2025  Provider: Tawny Pride MD    CHIEF COMPLAINT       Chief Complaint   Patient presents with    Hip Pain       ALLERGIES     Ciprofloxacin, Environmental/seasonal, Sulfa antibiotics, Sulfur, Thiazide-type diuretics, Amoxicillin, Peanut (diagnostic), and Sulfur dioxide    ENCOUNTER     HISTORY OF PRESENT ILLNESS  89-year-old female with a history of atrial fibrillation, sciatica, recurrent cystitis, urinary incontinence, urinary retention requiring King catheterization in January 2025, nocturia, chronic incomplete bladder emptying, and prior bladder suspension presents with a full day of lower back pain radiating down the side of the leg, past the knee into the calf, described as aching with tingling and a sensation of \"blood rushing\" in the calf. The pain is constant for 3-4 days, more severe in the morning, improves with movement during the day, and is sometimes exacerbated by activity; she reports leg weakness when her knee gave out 3 days ago but denies new numbness, focal deficits, upper back pain, weight loss, fever, drug use, or syncope. She did not fall but nearly did when the knee gave out, and the pain significantly impacts her daily activities though she remains ambulatory without difficulty doing basic chores in her independently living facility, [facility]. She has a history of prior similar episodes, brief relief with a right L5 transforaminal epidural steroid injection ([id number]) on 05-, and is scheduled for another injection next week; no pain medications were taken prior to arrival. History is provided by the patient, with prior records and lumbar spine MRI from 05- reviewed, showing multilevel disc and facet degenerative changes with severe canal stenosis at L4L5. She provided a list of medication allergies on    albuterol sulfate HFA (VENTOLIN HFA) 108 (90 Base) MCG/ACT inhaler Inhale 2 puffs into the lungs 4 times daily as needed for Wheezing  Qty: 18 g, Refills: 0      budesonide-formoterol (SYMBICORT) 160-4.5 MCG/ACT AERO Inhale 2 puffs into the lungs 2 times daily  Qty: 10.2 g, Refills: 3      apixaban (ELIQUIS) 5 MG TABS tablet Take 0.5 tablets by mouth 2 times daily  Qty: 30 tablet, Refills: 0      Misc Natural Products (THERAWORX PROTECT U-JASIEL) KIT Apply 1 Pump topically daily  Qty: 1 kit, Refills: 5      ALPRAZolam (XANAX) 0.5 MG tablet Take 1 tablet by mouth 2 times daily.      METAMUCIL SMOOTH TEXTURE 58.6 % powder USE 1 SCOOP AS DIRECTED ONCE DAILY      mupirocin (BACTROBAN) 2 % ointment APPLY OINTMENT ONCE DAILY TO AFFECTED AREA ON LEFT LOWER LEG      hydrocortisone (ANUSOL-HC) 2.5 % CREA rectal cream USE AS DIRECTED TWICE A DAY      azelastine (ASTELIN) 0.1 % nasal spray 1 spray 2 times daily      levothyroxine (SYNTHROID) 88 MCG tablet Take 75 mcg by mouth every morning      lisinopril (PRINIVIL;ZESTRIL) 40 MG tablet Take 1 tablet by mouth daily      metoprolol succinate (TOPROL XL) 25 MG extended release tablet Take 2 tablets by mouth daily      ASPIRIN 81 PO Take 81 mg by mouth      atorvastatin (LIPITOR) 20 MG tablet Take 1 tablet by mouth             REASSESSMENT     Vitals:    06/14/25 0756   BP: (!) 161/78   Pulse: 84   Resp: 16   Temp: 98.4 °F (36.9 °C)   TempSrc: Oral   SpO2: 94%   Height: 1.619 m (5' 3.75\")     ED Course as of 06/14/25 0948   Sat Jun 14, 2025   0807 MRI LUMBAR SPINE WO CONTRAST (5/6/25 5673)  IMPRESSION:  Multilevel disc and facet degenerative change with congenital narrowing of the  lumbar canal.     There is severe canal stenosis at L4-5.     Mild to moderate canal stenosis at L3-4.     Additional, less severe degenerative findings are as described in detail above.   [LT]   0810 Reviewed external notes from [facility] from Dr. Luis and Dr. Cevallos with Orthopedics/PM&R.  [LT]   7132

## 2025-07-02 ENCOUNTER — HOSPITAL ENCOUNTER (EMERGENCY)
Facility: HOSPITAL | Age: 89
Discharge: HOME | End: 2025-07-02
Attending: EMERGENCY MEDICINE
Payer: MEDICARE

## 2025-07-02 ENCOUNTER — APPOINTMENT (OUTPATIENT)
Facility: HOSPITAL | Age: 89
End: 2025-07-02
Payer: MEDICARE

## 2025-07-02 VITALS
HEART RATE: 97 BPM | HEIGHT: 64 IN | RESPIRATION RATE: 24 BRPM | TEMPERATURE: 97.8 F | SYSTOLIC BLOOD PRESSURE: 174 MMHG | OXYGEN SATURATION: 96 % | WEIGHT: 114.64 LBS | DIASTOLIC BLOOD PRESSURE: 99 MMHG | BODY MASS INDEX: 19.57 KG/M2

## 2025-07-02 DIAGNOSIS — J45.901 EXACERBATION OF ASTHMA, UNSPECIFIED ASTHMA SEVERITY, UNSPECIFIED WHETHER PERSISTENT: Primary | ICD-10-CM

## 2025-07-02 LAB
ALBUMIN SERPL-MCNC: 3.8 G/DL (ref 3.5–5)
ALBUMIN/GLOB SERPL: 1.3 (ref 1.1–2.2)
ALP SERPL-CCNC: 103 U/L (ref 45–117)
ALT SERPL-CCNC: 23 U/L (ref 12–78)
ANION GAP SERPL CALC-SCNC: 5 MMOL/L (ref 2–12)
AST SERPL-CCNC: 25 U/L (ref 15–37)
BASOPHILS # BLD: 0.04 K/UL (ref 0–0.1)
BASOPHILS NFR BLD: 0.5 % (ref 0–1)
BILIRUB SERPL-MCNC: 0.6 MG/DL (ref 0.2–1)
BUN SERPL-MCNC: 19 MG/DL (ref 6–20)
BUN/CREAT SERPL: 21 (ref 12–20)
CALCIUM SERPL-MCNC: 9.3 MG/DL (ref 8.5–10.1)
CHLORIDE SERPL-SCNC: 105 MMOL/L (ref 97–108)
CO2 SERPL-SCNC: 29 MMOL/L (ref 21–32)
COMMENT:: NORMAL
CREAT SERPL-MCNC: 0.9 MG/DL (ref 0.55–1.02)
DIFFERENTIAL METHOD BLD: NORMAL
EOSINOPHIL # BLD: 0.38 K/UL (ref 0–0.4)
EOSINOPHIL NFR BLD: 4.9 % (ref 0–7)
ERYTHROCYTE [DISTWIDTH] IN BLOOD BY AUTOMATED COUNT: 13.7 % (ref 11.5–14.5)
GLOBULIN SER CALC-MCNC: 3 G/DL (ref 2–4)
GLUCOSE SERPL-MCNC: 102 MG/DL (ref 65–100)
HCT VFR BLD AUTO: 44.2 % (ref 35–47)
HGB BLD-MCNC: 14.7 G/DL (ref 11.5–16)
IMM GRANULOCYTES # BLD AUTO: 0.02 K/UL (ref 0–0.04)
IMM GRANULOCYTES NFR BLD AUTO: 0.3 % (ref 0–0.5)
LYMPHOCYTES # BLD: 2.04 K/UL (ref 0.8–3.5)
LYMPHOCYTES NFR BLD: 26.5 % (ref 12–49)
MAGNESIUM SERPL-MCNC: 2.2 MG/DL (ref 1.6–2.4)
MCH RBC QN AUTO: 31.5 PG (ref 26–34)
MCHC RBC AUTO-ENTMCNC: 33.3 G/DL (ref 30–36.5)
MCV RBC AUTO: 94.6 FL (ref 80–99)
MONOCYTES # BLD: 0.7 K/UL (ref 0–1)
MONOCYTES NFR BLD: 9.1 % (ref 5–13)
NEUTS SEG # BLD: 4.53 K/UL (ref 1.8–8)
NEUTS SEG NFR BLD: 58.7 % (ref 32–75)
NRBC # BLD: 0 K/UL (ref 0–0.01)
NRBC BLD-RTO: 0 PER 100 WBC
NT PRO BNP: 854 PG/ML
PLATELET # BLD AUTO: 198 K/UL (ref 150–400)
PMV BLD AUTO: 10.7 FL (ref 8.9–12.9)
POTASSIUM SERPL-SCNC: 4.2 MMOL/L (ref 3.5–5.1)
PROT SERPL-MCNC: 6.8 G/DL (ref 6.4–8.2)
RBC # BLD AUTO: 4.67 M/UL (ref 3.8–5.2)
SODIUM SERPL-SCNC: 139 MMOL/L (ref 136–145)
SPECIMEN HOLD: NORMAL
WBC # BLD AUTO: 7.7 K/UL (ref 3.6–11)

## 2025-07-02 PROCEDURE — 83735 ASSAY OF MAGNESIUM: CPT

## 2025-07-02 PROCEDURE — 96374 THER/PROPH/DIAG INJ IV PUSH: CPT

## 2025-07-02 PROCEDURE — 80053 COMPREHEN METABOLIC PANEL: CPT

## 2025-07-02 PROCEDURE — 36415 COLL VENOUS BLD VENIPUNCTURE: CPT

## 2025-07-02 PROCEDURE — 99285 EMERGENCY DEPT VISIT HI MDM: CPT

## 2025-07-02 PROCEDURE — 6360000002 HC RX W HCPCS: Performed by: EMERGENCY MEDICINE

## 2025-07-02 PROCEDURE — 6370000000 HC RX 637 (ALT 250 FOR IP): Performed by: EMERGENCY MEDICINE

## 2025-07-02 PROCEDURE — 85025 COMPLETE CBC W/AUTO DIFF WBC: CPT

## 2025-07-02 PROCEDURE — 2500000003 HC RX 250 WO HCPCS: Performed by: EMERGENCY MEDICINE

## 2025-07-02 PROCEDURE — 71045 X-RAY EXAM CHEST 1 VIEW: CPT

## 2025-07-02 PROCEDURE — 83880 ASSAY OF NATRIURETIC PEPTIDE: CPT

## 2025-07-02 RX ORDER — IPRATROPIUM BROMIDE AND ALBUTEROL SULFATE 2.5; .5 MG/3ML; MG/3ML
1 SOLUTION RESPIRATORY (INHALATION)
Status: COMPLETED | OUTPATIENT
Start: 2025-07-02 | End: 2025-07-02

## 2025-07-02 RX ORDER — PREDNISONE 10 MG/1
40 TABLET ORAL DAILY
Qty: 20 TABLET | Refills: 0 | Status: SHIPPED | OUTPATIENT
Start: 2025-07-02 | End: 2025-07-07

## 2025-07-02 RX ADMIN — IPRATROPIUM BROMIDE AND ALBUTEROL SULFATE 1 DOSE: .5; 3 SOLUTION RESPIRATORY (INHALATION) at 17:40

## 2025-07-02 RX ADMIN — WATER 125 MG: 1 INJECTION INTRAMUSCULAR; INTRAVENOUS; SUBCUTANEOUS at 17:47

## 2025-07-02 ASSESSMENT — PAIN SCALES - GENERAL: PAINLEVEL_OUTOF10: 0

## 2025-07-02 ASSESSMENT — PAIN - FUNCTIONAL ASSESSMENT: PAIN_FUNCTIONAL_ASSESSMENT: 0-10

## 2025-07-02 NOTE — DISCHARGE INSTRUCTIONS
Please use your albuterol inhaler 2 to 4 puffs every 4 hours while awake for the next 48 hours and then as needed thereafter.    Please contact your pulmonologist in reference to the machine that you need specific to the medication you have at home

## 2025-07-02 NOTE — ED TRIAGE NOTES
Pt arrives to ED via EMS from [facility] with c/o diff breathing that began yesterday. Endorses wheezing with exertion.    Per EMS, patient SpO2 95-96% RA en route, pt sed inhaler 4 hours ago which helped but worsened again    Denies CP    Hx asthma, afib

## 2025-07-02 NOTE — ED PROVIDER NOTES
[facility] EMERGENCY DEPARTMENT  EMERGENCY DEPARTMENT ENCOUNTER      Pt Name: Zoey Sneed  MRN: [id number]  Birthdate 10/8/1930  Date of evaluation: 7/2/2025  Provider: Jamshid Andrade DO    CHIEF COMPLAINT       Chief Complaint   Patient presents with    Shortness of Breath         HISTORY OF PRESENT ILLNESS   (Location/Symptom, Timing/Onset, Context/Setting, Quality, Duration, Modifying Factors, Severity)  Note limiting factors.   89-year-old female comes in for shortness of breath.  Longstanding history of asthma.  She states that over the last several days she feels her asthma is been acting up.  She believes she is having asthma exacerbation.  She is short winded with any type of exertion and with speaking.  She denies chest pain fevers chills.  She denies peripheral edema noting no history of congestive heart failure.  She does have a history of A-fib and arrives in A-fib but is rate controlled.  She denies other complaints    The history is provided by the patient.         Review of External Medical Records:     Nursing Notes were reviewed.    REVIEW OF SYSTEMS    (2-9 systems for level 4, 10 or more for level 5)     Review of Systems    Except as noted above the remainder of the review of systems was reviewed and negative.       PAST MEDICAL HISTORY     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    Atrial fibrillation (HCC)     Atrophy of vulva 4/22/2009    Generalized anxiety disorder     Hx of bone density study 2/24/14    Normal    Hypertension     Postmenopausal atrophic vaginitis 4/22/2009    Symptomatic menopausal or female climacteric states     Unspecified asthma(493.90)     Unspecified hypothyroidism          SURGICAL HISTORY       Past Surgical History:   Procedure Laterality Date    HYSTERECTOMY, TOTAL ABDOMINAL (CERVIX REMOVED)  1970    W/ USO    OTHER SURGICAL HISTORY      Anterior Posterior Repair    THYROIDECTOMY,

## 2025-07-06 LAB
EKG ATRIAL RATE: 104 BPM
EKG DIAGNOSIS: NORMAL
EKG Q-T INTERVAL: 328 MS
EKG QRS DURATION: 74 MS
EKG QTC CALCULATION (BAZETT): 401 MS
EKG R AXIS: -52 DEGREES
EKG T AXIS: 32 DEGREES
EKG VENTRICULAR RATE: 90 BPM

## 2025-07-09 ENCOUNTER — OFFICE VISIT (OUTPATIENT)
Age: 89
End: 2025-07-09
Payer: MEDICARE

## 2025-07-09 VITALS
HEIGHT: 63 IN | SYSTOLIC BLOOD PRESSURE: 162 MMHG | HEART RATE: 96 BPM | DIASTOLIC BLOOD PRESSURE: 75 MMHG | WEIGHT: 114 LBS | BODY MASS INDEX: 20.2 KG/M2 | OXYGEN SATURATION: 90 %

## 2025-07-09 DIAGNOSIS — N30.90 RECURRENT CYSTITIS: Primary | ICD-10-CM

## 2025-07-09 DIAGNOSIS — J44.1 CHRONIC OBSTRUCTIVE PULMONARY DISEASE WITH ACUTE EXACERBATION (HCC): ICD-10-CM

## 2025-07-09 DIAGNOSIS — R33.9 INCOMPLETE EMPTYING OF BLADDER: ICD-10-CM

## 2025-07-09 DIAGNOSIS — N95.2 ATROPHIC VAGINITIS: ICD-10-CM

## 2025-07-09 DIAGNOSIS — R35.1 NOCTURIA: ICD-10-CM

## 2025-07-09 DIAGNOSIS — N39.41 URGE INCONTINENCE OF URINE: ICD-10-CM

## 2025-07-09 PROCEDURE — 1090F PRES/ABSN URINE INCON ASSESS: CPT | Performed by: UROLOGY

## 2025-07-09 PROCEDURE — 1123F ACP DISCUSS/DSCN MKR DOCD: CPT | Performed by: UROLOGY

## 2025-07-09 PROCEDURE — 0509F URINE INCON PLAN DOCD: CPT | Performed by: UROLOGY

## 2025-07-09 PROCEDURE — 3023F SPIROM DOC REV: CPT | Performed by: UROLOGY

## 2025-07-09 PROCEDURE — 1036F TOBACCO NON-USER: CPT | Performed by: UROLOGY

## 2025-07-09 PROCEDURE — 1159F MED LIST DOCD IN RCRD: CPT | Performed by: UROLOGY

## 2025-07-09 PROCEDURE — 1126F AMNT PAIN NOTED NONE PRSNT: CPT | Performed by: UROLOGY

## 2025-07-09 PROCEDURE — 99214 OFFICE O/P EST MOD 30 MIN: CPT | Performed by: UROLOGY

## 2025-07-09 PROCEDURE — G8420 CALC BMI NORM PARAMETERS: HCPCS | Performed by: UROLOGY

## 2025-07-09 PROCEDURE — G8427 DOCREV CUR MEDS BY ELIG CLIN: HCPCS | Performed by: UROLOGY

## 2025-07-09 RX ORDER — SILODOSIN 4 MG/1
4 CAPSULE ORAL EVERY EVENING
Qty: 90 CAPSULE | Refills: 3 | Status: SHIPPED | OUTPATIENT
Start: 2025-07-23

## 2025-07-09 ASSESSMENT — ENCOUNTER SYMPTOMS
SHORTNESS OF BREATH: 1
BACK PAIN: 1

## 2025-07-09 NOTE — PROGRESS NOTES
HISTORY OF PRESENT ILLNESS  Zoey Sneed is a 89 y.o. female.   has a past medical history of Acquired absence of both cervix and uterus, Atrial fibrillation (HCC), Atrophy of vulva, Generalized anxiety disorder, Hx of bone density study, Hypertension, Postmenopausal atrophic vaginitis, Symptomatic menopausal or female climacteric states, Unspecified asthma(493.90), and Unspecified hypothyroidism.  has a past surgical history that includes Thyroidectomy, partial; Hysterectomy, total abdominal (1970); Tonsillectomy; Urological Surgery; and other surgical history.  Chief Complaint   Patient presents with    Follow-up    Discuss Medications     She had an asthma exacerbation and is on steroids.  She is on nebulizers.  She feels improved.      She was on a purewick in the hospital.  She says her urine was light in color.      6/14/25 UA was clear.  She denies voiding symptoms.          1. Recurrent cystitis  Overview:  Recurrent cystitis with incomplete emptying. She is on daily methenamine hippurate. Indwelling javier with dilute chlorhexidine irrigation irritated the bladder.   Assessment & Plan:  No recurrent symptoms.   2. Incomplete emptying of bladder  Overview:  Unable to void with 150 cc to >450 cc in the bladder on evaluation.  She was advised on timed voiding and started on bethanechol.  She has had multiple issues with side effects and has not been able to consistently take the medication.      She has managed with a javier short term, but ultimately begins to feel like she is not voiding or does not void with removal.  Silodosin was added on 2/26/25.   Assessment & Plan:  Doing well on silodosin, bethanechol 50mg tid-qid, and methenamine.   Orders:  -     silodosin (RAPAFLO) 4 MG CAPS capsule; Take 1 capsule by mouth every evening, Disp-90 capsule, R-3Normal  3. Atrophic vaginitis  Overview:  On Estrace cream  4. Chronic obstructive pulmonary disease with acute exacerbation (HCC)  Assessment & Plan:  Improving on

## 2025-07-09 NOTE — PROGRESS NOTES
Chief Complaint   Patient presents with    Follow-up    Discuss Medications     1. Have you been to the ER, urgent care clinic since your last visit?  Hospitalized since your last visit?Yes When: 7/2/25 [facility]  Reason for visit: Shortness of breath    2. Have you seen or consulted any other health care providers outside of the [facility] since your last visit?  Include any pap smears or colon screening. No  BP (!) 162/75   Pulse 96   Ht 1.6 m (5' 3\")   Wt 51.7 kg (114 lb)   SpO2 90%   BMI 20.19 kg/m²